# Patient Record
Sex: FEMALE | Race: WHITE | Employment: UNEMPLOYED | URBAN - METROPOLITAN AREA
[De-identification: names, ages, dates, MRNs, and addresses within clinical notes are randomized per-mention and may not be internally consistent; named-entity substitution may affect disease eponyms.]

---

## 2017-06-22 RX ORDER — TEMAZEPAM 30 MG/1
30 CAPSULE ORAL
COMMUNITY
End: 2017-08-15

## 2017-06-22 RX ORDER — PREGABALIN 75 MG/1
150 CAPSULE ORAL 3 TIMES DAILY
COMMUNITY

## 2017-06-22 RX ORDER — METFORMIN HYDROCHLORIDE 500 MG/1
500 TABLET, FILM COATED, EXTENDED RELEASE ORAL
COMMUNITY
End: 2017-08-15

## 2017-06-22 RX ORDER — DIPHENOXYLATE HYDROCHLORIDE AND ATROPINE SULFATE 2.5; .025 MG/1; MG/1
1 TABLET ORAL
COMMUNITY
End: 2017-08-15

## 2017-06-22 RX ORDER — FUROSEMIDE 20 MG/1
20 TABLET ORAL DAILY
COMMUNITY

## 2017-06-22 RX ORDER — ALPRAZOLAM 1 MG/1
1 TABLET ORAL
COMMUNITY

## 2017-06-22 RX ORDER — TRAMADOL HYDROCHLORIDE 50 MG/1
50 TABLET ORAL
COMMUNITY
End: 2017-08-15

## 2017-06-22 NOTE — PROGRESS NOTES
PT aware of NPO status, except morning medications and inhalers/nebulizers. PT aware of need to hold anticoagulants per protocol. PT aware of potential for sedation administration and need for  at discharge. PT aware of arrival time pre procedure, 0730. Pt states no questions at this time and has our number if any arise. She is also aware of 4 hour hold post procedure.

## 2017-06-28 ENCOUNTER — HOSPITAL ENCOUNTER (OUTPATIENT)
Dept: GENERAL RADIOLOGY | Age: 52
Discharge: HOME | End: 2017-06-28
Attending: RADIOLOGY | Admitting: RADIOLOGY
Payer: MEDICARE

## 2017-06-28 ENCOUNTER — HOSPITAL ENCOUNTER (OUTPATIENT)
Dept: CT IMAGING | Age: 52
Discharge: HOME | End: 2017-06-28
Attending: ORTHOPAEDIC SURGERY
Payer: MEDICARE

## 2017-06-28 VITALS
RESPIRATION RATE: 18 BRPM | WEIGHT: 250 LBS | TEMPERATURE: 98.9 F | SYSTOLIC BLOOD PRESSURE: 122 MMHG | HEIGHT: 67 IN | DIASTOLIC BLOOD PRESSURE: 82 MMHG | OXYGEN SATURATION: 100 % | BODY MASS INDEX: 39.24 KG/M2 | HEART RATE: 93 BPM

## 2017-06-28 DIAGNOSIS — M54.50 LOW BACK PAIN: ICD-10-CM

## 2017-06-28 PROCEDURE — 74011636320 HC RX REV CODE- 636/320: Performed by: RADIOLOGY

## 2017-06-28 PROCEDURE — 72132 CT LUMBAR SPINE W/DYE: CPT

## 2017-06-28 PROCEDURE — 74011000250 HC RX REV CODE- 250: Performed by: RADIOLOGY

## 2017-06-28 PROCEDURE — 62304 MYELOGRAPHY LUMBAR INJECTION: CPT

## 2017-06-28 RX ORDER — SODIUM BICARBONATE 84 MG/ML
1-50 INJECTION, SOLUTION INTRAVENOUS
Status: COMPLETED | OUTPATIENT
Start: 2017-06-28 | End: 2017-06-28

## 2017-06-28 RX ORDER — ACETAMINOPHEN 325 MG/1
650 TABLET ORAL
Status: DISCONTINUED | OUTPATIENT
Start: 2017-06-28 | End: 2017-06-28 | Stop reason: HOSPADM

## 2017-06-28 RX ORDER — HYDROCODONE BITARTRATE AND ACETAMINOPHEN 7.5; 325 MG/1; MG/1
2 TABLET ORAL
Status: DISCONTINUED | OUTPATIENT
Start: 2017-06-28 | End: 2017-06-28 | Stop reason: HOSPADM

## 2017-06-28 RX ORDER — LIDOCAINE HYDROCHLORIDE 10 MG/ML
1-10 INJECTION, SOLUTION EPIDURAL; INFILTRATION; INTRACAUDAL; PERINEURAL
Status: COMPLETED | OUTPATIENT
Start: 2017-06-28 | End: 2017-06-28

## 2017-06-28 RX ADMIN — LIDOCAINE HYDROCHLORIDE 4 ML: 10 INJECTION, SOLUTION EPIDURAL; INFILTRATION; INTRACAUDAL; PERINEURAL at 10:03

## 2017-06-28 RX ADMIN — SODIUM BICARBONATE 1 MEQ: 84 INJECTION, SOLUTION INTRAVENOUS at 10:02

## 2017-06-28 RX ADMIN — IOPAMIDOL 12 ML: 408 INJECTION, SOLUTION INTRATHECAL at 10:04

## 2017-06-28 NOTE — DISCHARGE INSTRUCTIONS
Myelogram: About This Test  What is it? A myelogram uses X-rays and a special dye to make pictures of bones and nerves of the spine (spinal canal). The spinal canal holds the spinal cord, the spinal nerve roots, and the fluid-filled space between the bones in your spine. Why is this test done? A myelogram is done to check for:  · The cause of arm or leg numbness, weakness, or pain. · Narrowing of the spinal canal (spinal stenosis). · A tumor or infection causing problems with the spinal cord or nerve roots. · A spinal disc that has ruptured (herniated disc). · Inflammation of the membrane that covers the brain and spinal cord. · Problems with the blood vessels to the spine. How can you prepare for the test?  Your doctor will tell you if you need to change how much you eat and drink before the myelogram. You may be asked to increase the amount of water you drink before the test. Follow the instructions your doctor gives you about eating and drinking. Before a myelogram, tell your doctor if:  · You are allergic to any medicines, contrast material, or iodine dye. · You have had bleeding problems, or you take a blood thinner, such as aspirin, clopidogrel (Plavix), or warfarin (Coumadin), or you take over-the-counter medicines, such as ibuprofen (Advil, Motrin) or naproxen (Aleve). Your doctor will tell you when you should stop taking these medicines several days before your procedure. Make sure that you understand exactly what he or she wants you to do. · You have had kidney problems. · You have diabetes, especially if you take metformin (Glucophage, for example). · You are or might be pregnant. Ask someone to take you home and stay with you after the test.  What happens during the test?  The dye is put into your spinal canal with a thin needle. This is called a lumbar puncture. The dye moves through the space so the nerve roots and spinal cord can be seen more clearly.  After the dye is put in, you will lie still while the X-ray pictures are taken. How does it feel? You will feel a quick sting from a small needle that has medicine to numb the skin on your back. You will also feel some pressure as the long, thin spinal needle is put into your spinal canal. You may feel a quick sharp pain down your buttock or leg when the needle is moved in your spine. The dye may make you feel warm and flushed and have a metallic taste in your mouth. What else should you know about the test?  In rare cases, the hole made by the needle in the sac around the spine does not close normally. This can allow spinal fluid to leak out. This leak may need to be repaired through a procedure called an epidural blood patch. To do the patch, your doctor injects some of your own blood to cover the hole. How long does the test take? · A myelogram usually takes 30 minutes to 1 hour. What happens after the test?  · You will probably be able to go home 30 minutes to 2 hours after the test.  · You may need to lie in bed with your head raised for 4 to 24 hours after the test. To prevent seizures, which are a rare side effect, do not bend over or lie down with your head lower than your body. Keeping your head higher than your body after a myelogram also may help prevent or reduce other side effects, such as headache, nausea, or vomiting. · Avoid strenuous activity, such as running or heavy lifting, for at least 1 day after your myelogram.  · Drink plenty of water after the myelogram. Your doctor will give you instructions on taking your regular medicines. When should you call for help? Call 911 anytime you think you may need emergency care. For example, call if:  · You have a seizure. Call your doctor now or seek immediate medical care if:  · You have any increase in pain, weakness, or numbness in your legs. · You have a severe headache or stiff neck, or your eyes become very sensitive to light.   · You have a headache that lasts longer than 24 hours. · You have problems urinating or having a bowel movement. · You have a fever. Follow-up care is a key part of your treatment and safety. Be sure to make and go to all appointments, and call your doctor if you are having problems. It's also a good idea to keep a list of the medicines you take. Ask your doctor when you can expect to have your test results. Where can you learn more? Go to http://otilio-binh.info/. Enter P325 in the search box to learn more about \"Myelogram: About This Test.\"  Current as of: October 14, 2016  Content Version: 11.3  © 9253-9447 "[employer]. Care instructions adapted under license by [employer] (which disclaims liability or warranty for this information). If you have questions about a medical condition or this instruction, always ask your healthcare professional. [place] [address] any warranty or liability for your use of this information. DISCHARGE SUMMARY from Nurse       PATIENT INSTRUCTIONS:    After general anesthesia or intravenous sedation, for 24 hours or while taking prescription Narcotics:  · Limit your activities  · Do not drive and operate hazardous machinery  · Do not make important personal or business decisions  · Do  not drink alcoholic beverages  · If you have not urinated within 8 hours after discharge, please contact your surgeon on call.     Report the following to your surgeon:  · Excessive pain, swelling, redness or odor of or around the surgical area  · Temperature over 100.5  · Nausea and vomiting lasting longer than 4 hours or if unable to take medications  · Any signs of decreased circulation or nerve impairment to extremity: change in color, persistent  numbness, tingling, coldness or increase pain  · Any questions        What to do at Home:  Recommended activity: Activity as tolerated,     If you experience any of the following symptoms bleeding, numness, please follow up with DR Savage Rendon      *  Please give a list of your current medications to your Primary Care Provider. *  Please update this list whenever your medications are discontinued, doses are      changed, or new medications (including over-the-counter products) are added. *  Please carry medication information at all times in case of emergency situations. These are general instructions for a healthy lifestyle:    No smoking/ No tobacco products/ Avoid exposure to second hand smoke    Surgeon General's Warning:  Quitting smoking now greatly reduces serious risk to your health. Obesity, smoking, and sedentary lifestyle greatly increases your risk for illness    A healthy diet, regular physical exercise & weight monitoring are important for maintaining a healthy lifestyle    You may be retaining fluid if you have a history of heart failure or if you experience any of the following symptoms:  Weight gain of 3 pounds or more overnight or 5 pounds in a week, increased swelling in our hands or feet or shortness of breath while lying flat in bed. Please call your doctor as soon as you notice any of these symptoms; do not wait until your next office visit. Recognize signs and symptoms of STROKE:    F-face looks uneven    A-arms unable to move or move unevenly    S-speech slurred or non-existent    T-time-call 911 as soon as signs and symptoms begin-DO NOT go       Back to bed or wait to see if you get better-TIME IS BRAIN. Warning Signs of HEART ATTACK     Call 911 if you have these symptoms:   Chest discomfort. Most heart attacks involve discomfort in the center of the chest that lasts more than a few minutes, or that goes away and comes back. It can feel like uncomfortable pressure, squeezing, fullness, or pain.  Discomfort in other areas of the upper body. Symptoms can include pain or discomfort in one or both arms, the back, neck, jaw, or stomach.    Shortness of breath with or without chest discomfort.  Other signs may include breaking out in a cold sweat, nausea, or lightheadedness. Don't wait more than five minutes to call 911 - MINUTES MATTER! Fast action can save your life. Calling 911 is almost always the fastest way to get lifesaving treatment. Emergency Medical Services staff can begin treatment when they arrive -- up to an hour sooner than if someone gets to the hospital by car. The discharge information has been reviewed with the patient and caregiver. The patient and caregiver verbalized understanding. Discharge medications reviewed with the patient and caregiver and appropriate educational materials and side effects teaching were provided. Myelogram: About This Test  What is it? A myelogram uses X-rays and a special dye to make pictures of bones and nerves of the spine (spinal canal). The spinal canal holds the spinal cord, the spinal nerve roots, and the fluid-filled space between the bones in your spine. Why is this test done? A myelogram is done to check for:  · The cause of arm or leg numbness, weakness, or pain. · Narrowing of the spinal canal (spinal stenosis). · A tumor or infection causing problems with the spinal cord or nerve roots. · A spinal disc that has ruptured (herniated disc). · Inflammation of the membrane that covers the brain and spinal cord. · Problems with the blood vessels to the spine. How can you prepare for the test?  Your doctor will tell you if you need to change how much you eat and drink before the myelogram. You may be asked to increase the amount of water you drink before the test. Follow the instructions your doctor gives you about eating and drinking. Before a myelogram, tell your doctor if:  · You are allergic to any medicines, contrast material, or iodine dye.   · You have had bleeding problems, or you take a blood thinner, such as aspirin, clopidogrel (Plavix), or warfarin (Coumadin), or you take over-the-counter medicines, such as ibuprofen (Advil, Motrin) or naproxen (Aleve). Your doctor will tell you when you should stop taking these medicines several days before your procedure. Make sure that you understand exactly what he or she wants you to do. · You have had kidney problems. · You have diabetes, especially if you take metformin (Glucophage, for example). · You are or might be pregnant. Ask someone to take you home and stay with you after the test.  What happens during the test?  The dye is put into your spinal canal with a thin needle. This is called a lumbar puncture. The dye moves through the space so the nerve roots and spinal cord can be seen more clearly. After the dye is put in, you will lie still while the X-ray pictures are taken. How does it feel? You will feel a quick sting from a small needle that has medicine to numb the skin on your back. You will also feel some pressure as the long, thin spinal needle is put into your spinal canal. You may feel a quick sharp pain down your buttock or leg when the needle is moved in your spine. The dye may make you feel warm and flushed and have a metallic taste in your mouth. What else should you know about the test?  In rare cases, the hole made by the needle in the sac around the spine does not close normally. This can allow spinal fluid to leak out. This leak may need to be repaired through a procedure called an epidural blood patch. To do the patch, your doctor injects some of your own blood to cover the hole. How long does the test take? · A myelogram usually takes 30 minutes to 1 hour. What happens after the test?  · You will probably be able to go home 30 minutes to 2 hours after the test.  · You may need to lie in bed with your head raised for 4 to 24 hours after the test. To prevent seizures, which are a rare side effect, do not bend over or lie down with your head lower than your body.  Keeping your head higher than your body after a myelogram also may help prevent or reduce other side effects, such as headache, nausea, or vomiting. · Avoid strenuous activity, such as running or heavy lifting, for at least 1 day after your myelogram.  · Drink plenty of water after the myelogram. Your doctor will give you instructions on taking your regular medicines. When should you call for help? Call 911 anytime you think you may need emergency care. For example, call if:  · You have a seizure. Call your doctor now or seek immediate medical care if:  · You have any increase in pain, weakness, or numbness in your legs. · You have a severe headache or stiff neck, or your eyes become very sensitive to light. · You have a headache that lasts longer than 24 hours. · You have problems urinating or having a bowel movement. · You have a fever. Follow-up care is a key part of your treatment and safety. Be sure to make and go to all appointments, and call your doctor if you are having problems. It's also a good idea to keep a list of the medicines you take. Ask your doctor when you can expect to have your test results. Where can you learn more? Go to http://otilio-binh.info/. Enter F415 in the search box to learn more about \"Myelogram: About This Test.\"  Current as of: October 14, 2016  Content Version: 11.3  © 4636-5334 [employer]. Care instructions adapted under license by [employer] (which disclaims liability or warranty for this information). If you have questions about a medical condition or this instruction, always ask your healthcare professional. [place] [address] any warranty or liability for your use of this information.     DISCHARGE SUMMARY from Nurse    The following personal items are in your possession at time of discharge:                                    PATIENT INSTRUCTIONS:    After general anesthesia or intravenous sedation, for 24 hours or while taking prescription Narcotics:  · Limit your activities  · Do not drive and operate hazardous machinery  · Do not make important personal or business decisions  · Do  not drink alcoholic beverages  · If you have not urinated within 8 hours after discharge, please contact your surgeon on call. Report the following to your surgeon:  · Excessive pain, swelling, redness or odor of or around the surgical area  · Temperature over 100.5  · Nausea and vomiting lasting longer than 4 hours or if unable to take medications  · Any signs of decreased circulation or nerve impairment to extremity: change in color, persistent  numbness, tingling, coldness or increase pain  · Any questions        What to do at Home:  Recommended activity: Activity as tolerated,     These are general instructions for a healthy lifestyle:    No smoking/ No tobacco products/ Avoid exposure to second hand smoke    Surgeon General's Warning:  Quitting smoking now greatly reduces serious risk to your health. Obesity, smoking, and sedentary lifestyle greatly increases your risk for illness    A healthy diet, regular physical exercise & weight monitoring are important for maintaining a healthy lifestyle    You may be retaining fluid if you have a history of heart failure or if you experience any of the following symptoms:  Weight gain of 3 pounds or more overnight or 5 pounds in a week, increased swelling in our hands or feet or shortness of breath while lying flat in bed. Please call your doctor as soon as you notice any of these symptoms; do not wait until your next office visit. Recognize signs and symptoms of STROKE:    F-face looks uneven    A-arms unable to move or move unevenly    S-speech slurred or non-existent    T-time-call 911 as soon as signs and symptoms begin-DO NOT go       Back to bed or wait to see if you get better-TIME IS BRAIN. Warning Signs of HEART ATTACK     Call 911 if you have these symptoms:   Chest discomfort.  Most heart attacks involve discomfort in the center of the chest that lasts more than a few minutes, or that goes away and comes back. It can feel like uncomfortable pressure, squeezing, fullness, or pain.  Discomfort in other areas of the upper body. Symptoms can include pain or discomfort in one or both arms, the back, neck, jaw, or stomach.  Shortness of breath with or without chest discomfort.  Other signs may include breaking out in a cold sweat, nausea, or lightheadedness. Don't wait more than five minutes to call 911 - MINUTES MATTER! Fast action can save your life. Calling 911 is almost always the fastest way to get lifesaving treatment. Emergency Medical Services staff can begin treatment when they arrive -- up to an hour sooner than if someone gets to the hospital by car. The discharge information has been reviewed with the patient and caregiver. The patient and caregiver verbalized understanding. Discharge medications reviewed with the patient and caregiver and appropriate educational materials and side effects teaching were provided.         Patient armband removed and shredded

## 2017-06-28 NOTE — ROUTINE PROCESS
Patient tolerated procedure well, has one needlestick and one band aid to back, spoke with Della Seo in heart center

## 2017-06-28 NOTE — PROGRESS NOTES
1100 Discharge instructions given to patient and daughter and they verbalized all understanding. Pt voiced no complains of pain @ the time of discharge. Pt was escorted via wheelchair to car. Left with daughter in stable condition.

## 2017-06-28 NOTE — H&P
..      The patient is an appropriate candidate to undergo Lumbar Puncture for CT Myelogram.    History and Physical update:  H&P was reviewed and the patient was evaluated. No changes have occurred in the patient's condition since the H&P was completed.     Landon Abdalla MD  Vascular & Interventional Radiology  [facility]  6/28/2017

## 2017-08-15 ENCOUNTER — HOSPITAL ENCOUNTER (OUTPATIENT)
Dept: PREADMISSION TESTING | Age: 52
Discharge: HOME | End: 2017-08-15
Payer: MEDICARE

## 2017-08-15 VITALS — WEIGHT: 251 LBS | HEIGHT: 67 IN | BODY MASS INDEX: 39.39 KG/M2

## 2017-08-15 LAB
ANION GAP BLD CALC-SCNC: 9 MMOL/L (ref 3–18)
APTT PPP: 25.2 SEC (ref 23–36.4)
BUN SERPL-MCNC: 12 MG/DL (ref 7–18)
BUN/CREAT SERPL: 14 (ref 12–20)
CALCIUM SERPL-MCNC: 9.5 MG/DL (ref 8.5–10.1)
CHLORIDE SERPL-SCNC: 100 MMOL/L (ref 100–108)
CO2 SERPL-SCNC: 29 MMOL/L (ref 21–32)
CREAT SERPL-MCNC: 0.84 MG/DL (ref 0.6–1.3)
ERYTHROCYTE [DISTWIDTH] IN BLOOD BY AUTOMATED COUNT: 12.6 % (ref 11.6–14.5)
EST. AVERAGE GLUCOSE BLD GHB EST-MCNC: 252 MG/DL
GLUCOSE SERPL-MCNC: 368 MG/DL (ref 74–99)
HBA1C MFR BLD: 10.4 % (ref 4.5–5.6)
HCT VFR BLD AUTO: 36.7 % (ref 35–45)
HGB BLD-MCNC: 12.4 G/DL (ref 12–16)
INR PPP: 0.9 (ref 0.8–1.2)
MCH RBC QN AUTO: 26.3 PG (ref 24–34)
MCHC RBC AUTO-ENTMCNC: 33.8 G/DL (ref 31–37)
MCV RBC AUTO: 77.8 FL (ref 74–97)
PLATELET # BLD AUTO: 284 K/UL (ref 135–420)
PMV BLD AUTO: 10.9 FL (ref 9.2–11.8)
POTASSIUM SERPL-SCNC: 4 MMOL/L (ref 3.5–5.5)
PROTHROMBIN TIME: 11.6 SEC (ref 11.5–15.2)
RBC # BLD AUTO: 4.72 M/UL (ref 4.2–5.3)
SODIUM SERPL-SCNC: 138 MMOL/L (ref 136–145)
WBC # BLD AUTO: 6.7 K/UL (ref 4.6–13.2)

## 2017-08-15 PROCEDURE — 80048 BASIC METABOLIC PNL TOTAL CA: CPT | Performed by: ORTHOPAEDIC SURGERY

## 2017-08-15 PROCEDURE — 85730 THROMBOPLASTIN TIME PARTIAL: CPT | Performed by: ORTHOPAEDIC SURGERY

## 2017-08-15 PROCEDURE — 85027 COMPLETE CBC AUTOMATED: CPT | Performed by: ORTHOPAEDIC SURGERY

## 2017-08-15 PROCEDURE — 87641 MR-STAPH DNA AMP PROBE: CPT | Performed by: ORTHOPAEDIC SURGERY

## 2017-08-15 PROCEDURE — 85610 PROTHROMBIN TIME: CPT | Performed by: ORTHOPAEDIC SURGERY

## 2017-08-15 PROCEDURE — 83036 HEMOGLOBIN GLYCOSYLATED A1C: CPT | Performed by: ANESTHESIOLOGY

## 2017-08-15 RX ORDER — INSULIN GLARGINE 100 [IU]/ML
INJECTION, SOLUTION SUBCUTANEOUS 2 TIMES DAILY
Status: ON HOLD | COMMUNITY
End: 2019-01-16

## 2017-08-15 RX ORDER — INSULIN LISPRO 100 [IU]/ML
INJECTION, SOLUTION INTRAVENOUS; SUBCUTANEOUS
Status: ON HOLD | COMMUNITY
End: 2019-01-16

## 2017-08-15 RX ORDER — ZOLPIDEM TARTRATE 10 MG/1
TABLET ORAL
COMMUNITY

## 2017-08-15 RX ORDER — SODIUM CHLORIDE, SODIUM LACTATE, POTASSIUM CHLORIDE, CALCIUM CHLORIDE 600; 310; 30; 20 MG/100ML; MG/100ML; MG/100ML; MG/100ML
125 INJECTION, SOLUTION INTRAVENOUS CONTINUOUS
Status: CANCELLED | OUTPATIENT
Start: 2017-08-15

## 2017-08-15 RX ORDER — ALBUTEROL SULFATE 90 UG/1
AEROSOL, METERED RESPIRATORY (INHALATION)
COMMUNITY

## 2017-08-15 RX ORDER — CEFAZOLIN SODIUM 2 G/50ML
2 SOLUTION INTRAVENOUS ONCE
Status: CANCELLED | OUTPATIENT
Start: 2017-08-15 | End: 2017-08-15

## 2017-08-15 NOTE — PERIOP NOTES
No sleep apnea or previous sleep studies. No history of MH. PCP is aware of surgery. Care fusion instructions reviewed and kit given. Meets  criteria for special population at this time, OR posting notified. Not participating in clinical trials or research study. Verbal permission given to view records in [facility]. Patient will call Endocrinologist for pre-op recommendations for insulin.

## 2017-08-16 LAB
BACTERIA SPEC CULT: NORMAL
SERVICE CMNT-IMP: NORMAL

## 2017-08-17 NOTE — PERIOP NOTES
Spoke to Dr. Myranda Beach office, they will fax clearance, office visit, stress test and ekg this am.

## 2017-08-17 NOTE — PERIOP NOTES
Message left with Lauren at Dr. [facility] office regarding Dr. Omega Choudhary request for pulmonary clearance.  Also left message this am regarding HgA1c of 10.4 and glucose of 368

## 2017-12-11 NOTE — IP AVS SNAPSHOT
Zeeshan Banuelos 
 
 
 [address] [facility] [address] 
[phone] Patient: Kelley Johnson MRN: [id number] :1965 You are allergic to the following Allergen Reactions Sulfa (Sulfonamide Antibiotics) Hives Rash Itching Recent Documentation Height Weight BMI OB Status Smoking Status 1.702 m 113.4 kg 39.16 kg/m2 Hysterectomy Current Every Day Smoker Emergency Contacts Name Discharge Info Relation Home Work Mobile [place]  Child [2] [phone] About your hospitalization You were admitted on:  2017 You last received care in the:  [address] You were discharged on:  2017 Unit phone number:  [phone] Why you were hospitalized Your primary diagnosis was:  Not on File Providers Seen During Your Hospitalizations Provider Role Specialty Primary office phone Angi Joshi MD Attending Provider Diagnostic Radiology [phone] Your Primary Care Physician (PCP) Primary Care Physician Office Phone Office Fax Herb Uriostegui, Tawny [facility] [phone] Follow-up Information Follow up With Details Comments Contact Info [place]   [id number] [place] [address] [place] [address] 
[phone] Current Discharge Medication List  
  
ASK your doctor about these medications Dose & Instructions Dispensing Information Comments Morning Noon Evening Bedtime  
 albuterol sulfate 2.5 mg/0.5 mL Nebu nebulizer solution Commonly known as:  PROVENTIL;VENTOLIN Your last dose was: Your next dose is:    
   
   
 Dose:  2.5 mg  
2.5 mg by Nebulization route four (4) times daily. Indications: BRONCHOSPASM PREVENTION Refills:  0  
     
   
   
   
  
 albuterol-ipratropium 2.5 mg-0.5 mg/3 ml Nebu Commonly known as:  Henretta Kidney Your last dose was: Your next dose is:    
   
   
 Dose:  3 mL  
3 mL by Nebulization route every four (4) hours. Indications: CHRONIC OBSTRUCTIVE PULMONARY DISEASE WITH BRONCHOSPASMS Refills:  0 ALPRAZolam 1 mg tablet Commonly known as:  Hillary Casarez Your last dose was: Your next dose is:    
   
   
 Dose:  1 mg Take 1 mg by mouth. Indications: anxiety Refills:  0  
     
   
   
   
  
 ATROVENT HFA 17 mcg/actuation inhaler Generic drug:  ipratropium Your last dose was: Your next dose is:    
   
   
 Dose:  1 Puff Take 1 Puff by inhalation. Refills:  0  
     
   
   
   
  
 CYMBALTA 60 mg capsule Generic drug:  DULoxetine Your last dose was: Your next dose is:    
   
   
 Dose:  120 mg Take 120 mg by mouth daily. Refills:  0  
     
   
   
   
  
 DALIRESP Tab tablet Generic drug:  roflumilast  
   
Your last dose was: Your next dose is:    
   
   
 Dose:  500 mcg Take 500 mcg by mouth daily. Indications: COPD ASSOCIATED WITH CHRONIC BRONCHITIS Refills:  0  
     
   
   
   
  
 diphenoxylate-atropine 2.5-0.025 mg per tablet Commonly known as:  LOMOTIL Your last dose was: Your next dose is:    
   
   
 Dose:  1 Tab Take 1 Tab by mouth four (4) times daily as needed for Diarrhea. Refills:  0 [address] Your last dose was: Your next dose is:    
   
   
 Dose:  2 Puff Take 2 puffs by inhalation two (2) times a day. Refills:  0 HumaLOG Mix 50-50 100 unit/mL (50-50) injection Generic drug:  insulin lispro protamine/insulin lispro Your last dose was: Your next dose is:    
   
   
 by SubCUTAneous route two (2) times a day. SLIDING SCALE Refills:  0  
     
   
   
   
  
 LASIX 20 mg tablet Generic drug:  furosemide Your last dose was: Your next dose is:    
   
   
 Dose:  20 mg Take 20 mg by mouth daily. Refills:  0  
     
   
   
   
  
 lisinopril-hydroCHLOROthiazide 10-12.5 mg per tablet Commonly known as:  Chidi Rasp Your last dose was: Your next dose is:    
   
   
 Dose:  1 Tab Take 1 Tab by mouth daily. Instructed to take with sip of water DOS Refills:  0 LYRICA 75 mg capsule Generic drug:  pregabalin Your last dose was: Your next dose is:    
   
   
 Dose:  75 mg Take 75 mg by mouth. Refills:  0  
     
   
   
   
  
 metFORMIN 500 mg Tg24 24 hour tablet Commonly known asAmadeo Leather ER Your last dose was: Your next dose is:    
   
   
 Dose:  500 mg Take 500 mg by mouth. Refills:  0 NexIUM 40 mg capsule Generic drug:  esomeprazole Your last dose was: Your next dose is: Take  by mouth daily. Instructed to take with sip of water DOS Refills:  0  
     
   
   
   
  
 temazepam 30 mg capsule Commonly known as:  RESTORIL Your last dose was: Your next dose is:    
   
   
 Dose:  30 mg Take 30 mg by mouth nightly as needed for Sleep. Refills:  0  
     
   
   
   
  
 traMADol 50 mg tablet Commonly known as:  ULTRAM  
   
Your last dose was: Your next dose is:    
   
   
 Dose:  50 mg Take 50 mg by mouth every six (6) hours as needed for Pain. Refills:  0 Discharge Instructions Myelogram: About This Test 
What is it? A myelogram uses X-rays and a special dye to make pictures of bones and nerves of the spine (spinal canal). The spinal canal holds the spinal cord, the spinal nerve roots, and the fluid-filled space between the bones in your spine. Why is this test done? A myelogram is done to check for: · The cause of arm or leg numbness, weakness, or pain. · Narrowing of the spinal canal (spinal stenosis). · A tumor or infection causing problems with the spinal cord or nerve roots. · A spinal disc that has ruptured (herniated disc). · Inflammation of the membrane that covers the brain and spinal cord. · Problems with the blood vessels to the spine. How can you prepare for the test? 
Your doctor will tell you if you need to change how much you eat and drink before the myelogram. You may be asked to increase the amount of water you drink before the test. Follow the instructions your doctor gives you about eating and drinking. Before a myelogram, tell your doctor if: 
· You are allergic to any medicines, contrast material, or iodine dye. · You have had bleeding problems, or you take a blood thinner, such as aspirin, clopidogrel (Plavix), or warfarin (Coumadin), or you take over-the-counter medicines, such as ibuprofen (Advil, Motrin) or naproxen (Aleve). Your doctor will tell you when you should stop taking these medicines several days before your procedure. Make sure that you understand exactly what he or she wants you to do. · You have had kidney problems. · You have diabetes, especially if you take metformin (Glucophage, for example). · You are or might be pregnant. Ask someone to take you home and stay with you after the test. 
What happens during the test? 
The dye is put into your spinal canal with a thin needle. This is called a lumbar puncture. The dye moves through the space so the nerve roots and spinal cord can be seen more clearly. After the dye is put in, you will lie still while the X-ray pictures are taken. How does it feel? You will feel a quick sting from a small needle that has medicine to numb the skin on your back. You will also feel some pressure as the long, thin spinal needle is put into your spinal canal. You may feel a quick sharp pain down your buttock or leg when the needle is moved in your spine. The dye may make you feel warm and flushed and have a metallic taste in your mouth. What else should you know about the test? 
In rare cases, the hole made by the needle in the sac around the spine does not close normally. This can allow spinal fluid to leak out. This leak may need to be repaired through a procedure called an epidural blood patch. To do the patch, your doctor injects some of your own blood to cover the hole. How long does the test take? · A myelogram usually takes 30 minutes to 1 hour. What happens after the test? 
· You will probably be able to go home 30 minutes to 2 hours after the test. 
· You may need to lie in bed with your head raised for 4 to 24 hours after the test. To prevent seizures, which are a rare side effect, do not bend over or lie down with your head lower than your body. Keeping your head higher than your body after a myelogram also may help prevent or reduce other side effects, such as headache, nausea, or vomiting. · Avoid strenuous activity, such as running or heavy lifting, for at least 1 day after your myelogram. 
· Drink plenty of water after the myelogram. Your doctor will give you instructions on taking your regular medicines. When should you call for help? Call 911 anytime you think you may need emergency care. For example, call if: 
· You have a seizure. Call your doctor now or seek immediate medical care if: 
· You have any increase in pain, weakness, or numbness in your legs. · You have a severe headache or stiff neck, or your eyes become very sensitive to light. · You have a headache that lasts longer than 24 hours. · You have problems urinating or having a bowel movement. · You have a fever. Follow-up care is a key part of your treatment and safety. Be sure to make and go to all appointments, and call your doctor if you are having problems. It's also a good idea to keep a list of the medicines you take. Ask your doctor when you can expect to have your test results. Where can you learn more? Go to http://otilio-binh.info/. Enter E234 in the search box to learn more about \"Myelogram: About This Test.\" Current as of: October 14, 2016 Content Version: 11.3 © 4666-3018 [employer]. Care instructions adapted under license by [employer] (which disclaims liability or warranty for this information). If you have questions about a medical condition or this instruction, always ask your healthcare professional. [facility] any warranty or liability for your use of this information. DISCHARGE SUMMARY from Nurse PATIENT INSTRUCTIONS: 
 
 
F-face looks uneven A-arms unable to move or move unevenly S-speech slurred or non-existent T-time-call 911 as soon as signs and symptoms begin-DO NOT go Back to bed or wait to see if you get better-TIME IS BRAIN. Warning Signs of HEART ATTACK Call 911 if you have these symptoms: 
? Chest discomfort. Most heart attacks involve discomfort in the center of the chest that lasts more than a few minutes, or that goes away and comes back. It can feel like uncomfortable pressure, squeezing, fullness, or pain. ? Discomfort in other areas of the upper body. Symptoms can include pain or discomfort in one or both arms, the back, neck, jaw, or stomach. ? Shortness of breath with or without chest discomfort. ? Other signs may include breaking out in a cold sweat, nausea, or lightheadedness. Don't wait more than five minutes to call [address]! Fast action can save your life. Calling 911 is almost always the fastest way to get lifesaving treatment.  Emergency Medical Services staff can begin treatment when they arrive  up to an hour sooner than if someone gets to the hospital by car. The discharge information has been reviewed with the patient and caregiver. The patient and caregiver verbalized understanding. Discharge medications reviewed with the patient and caregiver and appropriate educational materials and side effects teaching were provided. Myelogram: About This Test 
What is it? A myelogram uses X-rays and a special dye to make pictures of bones and nerves of the spine (spinal canal). The spinal canal holds the spinal cord, the spinal nerve roots, and the fluid-filled space between the bones in your spine. Why is this test done? A myelogram is done to check for: · The cause of arm or leg numbness, weakness, or pain. · Narrowing of the spinal canal (spinal stenosis). · A tumor or infection causing problems with the spinal cord or nerve roots. · A spinal disc that has ruptured (herniated disc). · Inflammation of the membrane that covers the brain and spinal cord. · Problems with the blood vessels to the spine. How can you prepare for the test? 
Your doctor will tell you if you need to change how much you eat and drink before the myelogram. You may be asked to increase the amount of water you drink before the test. Follow the instructions your doctor gives you about eating and drinking. Before a myelogram, tell your doctor if: 
· You are allergic to any medicines, contrast material, or iodine dye. · You have had bleeding problems, or you take a blood thinner, such as aspirin, clopidogrel (Plavix), or warfarin (Coumadin), or you take over-the-counter medicines, such as ibuprofen (Advil, Motrin) or naproxen (Aleve). Your doctor will tell you when you should stop taking these medicines several days before your procedure. Make sure that you understand exactly what he or she wants you to do. · You have had kidney problems. · You have diabetes, especially if you take metformin (Glucophage, for example). · You are or might be pregnant. Ask someone to take you home and stay with you after the test. 
What happens during the test? 
The dye is put into your spinal canal with a thin needle. This is called a lumbar puncture. The dye moves through the space so the nerve roots and spinal cord can be seen more clearly. After the dye is put in, you will lie still while the X-ray pictures are taken. How does it feel? You will feel a quick sting from a small needle that has medicine to numb the skin on your back. You will also feel some pressure as the long, thin spinal needle is put into your spinal canal. You may feel a quick sharp pain down your buttock or leg when the needle is moved in your spine. The dye may make you feel warm and flushed and have a metallic taste in your mouth. What else should you know about the test? 
In rare cases, the hole made by the needle in the sac around the spine does not close normally. This can allow spinal fluid to leak out. This leak may need to be repaired through a procedure called an epidural blood patch. To do the patch, your doctor injects some of your own blood to cover the hole. How long does the test take? · A myelogram usually takes 30 minutes to 1 hour. What happens after the test? 
· You will probably be able to go home 30 minutes to 2 hours after the test. 
· You may need to lie in bed with your head raised for 4 to 24 hours after the test. To prevent seizures, which are a rare side effect, do not bend over or lie down with your head lower than your body. Keeping your head higher than your body after a myelogram also may help prevent or reduce other side effects, such as headache, nausea, or vomiting.  
· Avoid strenuous activity, such as running or heavy lifting, for at least 1 day after your myelogram. 
· Drink plenty of water after the myelogram. Your doctor will give you instructions on taking your regular medicines. When should you call for help? Call 911 anytime you think you may need emergency care. For example, call if: 
· You have a seizure. Call your doctor now or seek immediate medical care if: 
· You have any increase in pain, weakness, or numbness in your legs. · You have a severe headache or stiff neck, or your eyes become very sensitive to light. · You have a headache that lasts longer than 24 hours. · You have problems urinating or having a bowel movement. · You have a fever. Follow-up care is a key part of your treatment and safety. Be sure to make and go to all appointments, and call your doctor if you are having problems. It's also a good idea to keep a list of the medicines you take. Ask your doctor when you can expect to have your test results. Where can you learn more? Go to http://otilio-binh.info/. Enter R318 in the search box to learn more about \"Myelogram: About This Test.\" Current as of: October 14, 2016 Content Version: 11.3 © 2360-3702 [employer]. Care instructions adapted under license by [employer] (which disclaims liability or warranty for this information). If you have questions about a medical condition or this instruction, always ask your healthcare professional. [place] [address] any warranty or liability for your use of this information. DISCHARGE SUMMARY from Nurse The following personal items are in your possession at time of discharge: 
 
  
  
  
  
  
  
  
  
 
 
 
 
PATIENT INSTRUCTIONS: 
 
After general anesthesia or intravenous sedation, for 24 hours or while taking prescription Narcotics: · Limit your activities · Do not drive and operate hazardous machinery · Do not make important personal or business decisions · Do  not drink alcoholic beverages · If you have not urinated within 8 hours after discharge, please contact your surgeon on call. Report the following to your surgeon: 
· Excessive pain, swelling, redness or odor of or around the surgical area · Temperature over 100.5 · Nausea and vomiting lasting longer than 4 hours or if unable to take medications · Any signs of decreased circulation or nerve impairment to extremity: change in color, persistent  numbness, tingling, coldness or increase pain · Any questions What to do at Home: 
Recommended activity: Activity as tolerated, These are general instructions for a healthy lifestyle: No smoking/ No tobacco products/ Avoid exposure to second hand smoke Surgeon General's Warning:  Quitting smoking now greatly reduces serious risk to your health. Obesity, smoking, and sedentary lifestyle greatly increases your risk for illness A healthy diet, regular physical exercise & weight monitoring are important for maintaining a healthy lifestyle You may be retaining fluid if you have a history of heart failure or if you experience any of the following symptoms:  Weight gain of 3 pounds or more overnight or 5 pounds in a week, increased swelling in our hands or feet or shortness of breath while lying flat in bed. Please call your doctor as soon as you notice any of these symptoms; do not wait until your next office visit. Recognize signs and symptoms of STROKE: 
 
F-face looks uneven A-arms unable to move or move unevenly S-speech slurred or non-existent T-time-call 911 as soon as signs and symptoms begin-DO NOT go Back to bed or wait to see if you get better-TIME IS BRAIN. Warning Signs of HEART ATTACK Call 911 if you have these symptoms: 
? Chest discomfort. Most heart attacks involve discomfort in the center of the chest that lasts more than a few minutes, or that goes away and comes back. It can feel like uncomfortable pressure, squeezing, fullness, or pain. ? Discomfort in other areas of the upper body.  Symptoms can include pain or discomfort in one or both arms, the back, neck, jaw, or stomach. ? Shortness of breath with or without chest discomfort. ? Other signs may include breaking out in a cold sweat, nausea, or lightheadedness. Don't wait more than five minutes to call [address]! Fast action can save your life. Calling 911 is almost always the fastest way to get lifesaving treatment. Emergency Medical Services staff can begin treatment when they arrive  up to an hour sooner than if someone gets to the hospital by car. The discharge information has been reviewed with the patient and caregiver. The patient and caregiver verbalized understanding. Discharge medications reviewed with the patient and caregiver and appropriate educational materials and side effects teaching were provided. Patient armband removed and shredded Discharge Orders None Introducing [facility]! Nnamdi Cuellar introduces Face-Me patient portal. Now you can access parts of your medical record, email your doctor's office, and request medication refills online. 1. In your internet browser, go to [email or web address] 2. Click on the First Time User? Click Here link in the Sign In box. You will see the New Member Sign Up page. 3. Enter your the graftert Access Code exactly as it appears below. You will not need to use this code after youve completed the sign-up process. If you do not sign up before the expiration date, you must request a new code. · Face-Me Access Code: [email or web address] Expires: 9/10/2017 11:53 AM 
 
4. Enter the last four digits of your Social Security Number (xxxx) and Date of Birth (mm/dd/yyyy) as indicated and click Submit. You will be taken to the next sign-up page. 5. Create a the graftert ID. This will be your the graftert login ID and cannot be changed, so think of one that is secure and easy to remember. 6. Create a the graftert password. You can change your password at any time. 7. Enter your Password Reset Question and Answer. This can be used at a later time if you forget your password. 8. Enter your e-mail address. You will receive e-mail notification when new information is available in [address] 9. Click Sign Up. You can now view and download portions of your medical record. 10. Click the Download Summary menu link to download a portable copy of your medical information. If you have questions, please visit the Frequently Asked Questions section of the [facility] website. Remember, [facility] is NOT to be used for urgent needs. For medical emergencies, dial 911. Now available from your iPhone and Android! General Information Please provide this summary of care documentation to your next provider. Patient Signature:  ____________________________________________________________ Date:  ____________________________________________________________  
  
Jose Armando Malik Provider Signature:  ____________________________________________________________ Date:  ____________________________________________________________ done

## 2018-11-05 ENCOUNTER — DOCUMENTATION ONLY (OUTPATIENT)
Dept: PULMONOLOGY | Age: 53
End: 2018-11-05

## 2018-11-05 NOTE — PROGRESS NOTES
[facility] [place] [address] [employer] [id number]  [address]  Tel: [phone]  Fax: [phone]    PATIENT:       [facility]   YOB: 1965  DATE:       11/05/2018 11:00 AM  VISIT TYPE:       Office Visit      Completed Orders (this encounter)  Order Interpretation Result Next Lab Date   Giving encouragement to exercise      Dietary management education, guidance, and counseling        Assessment/Plan  # Detail Type Description    1. Assessment Sleep apnea in adult (G47.30). Impression -Snoring/witness apnea/ESS more than 9 suggest possibility of sleep apnea  -Studies were ordered in the past never been done  -Due to COPD/chronic respite a failure on home oxygen will require in lab sleep study  -If patient does have sleep apnea will benefit from CPAP/BiPAP treatment  -Plan of care discussed with the patient at length    3/20/2018:  -COPD/chronic respiratory failure/ongoing symptoms  -Another order for in lab PSG sent  -We'll follow after that    May 2018:  -Sleep study was canceled by 1 sleep (twice followed by once by the patient  -Still in a PSG is not done we have advised her stop to get it done before we see her next time  -We will follow up in 1-2 months    November 2018:  -Status post in lab PSG-could not sleep  Results: Inconclusive sleep study  -Patient had spent only 200 minute and sleep lab with sleep time of 76 minute  -Patient had cough and shortness of breath at nighttime requested oxygen-due to lack protocol oxygen was not given  -Patient shortened the study and went home around 2 a.m.  -Some alpha intrusion noted  -AHI was 3 per hour  -Oxygen tanner of 94 with average of 97%  -Arousal index of 17 per hour noted    Plan:  -We will plan home sleep study, advised patient to do home sleep study without oxygen and follow-up after that. Patient Plan Obstructive Sleep Apnea (NATHANIEL).        a.  The consequences of sleep apnea; cardiovascular disease (increase risk of stroke, heart disease, hypertension), cognitive difficulties (attention, memory, concentration difficulties), endocrine difficulties (insulin resistance and possible increase risk of diabetes) and vegetative symptoms (sleepiness, fatigue) were discussed with the patient. b.  The patient was warned that sleep apnea is associated with increase risk of motor vehicle accidents due to daytime sleepiness. c.  The pathophysiology of sleep apnea and different treatment modalities (CPAP, surgery, dental appliance, weight reduction, positional therapy, medications) were discussed. 2. Assessment Asthma due to environmental allergies (J45.909).     Impression 5/22/2018:  -Came for follow-up  -Ongoing symptoms of cough wheezing and shortness of breath  -Jens in office today is 47  -Also received prednisone in between and she came for PFT  -As per the patient she is not smoking for last few days  -FEV1 to FVC ratio 52 with FEV1 of 1.61 or 56% predicted suggest moderate obstructive airway disease with Armando bronchodilator response  -TLC and DLCO is normal  -Overall consistent with COPD/asthma overlap syndrome    -Sheng 1 is MM  -Anti-proteinase antibody 5.7 and high  -Allergen profile positive  -WBC 6.0 eosinophils 4% absolute eosinophils 0.3  -YAZMIN/RNP/other antibodies negative  -ACE level 33  -IgE level is 2554  -Aspergillus antibodies negative  8/13/2018:  -Came for follow-up  -Was seen by Dr. Kuldeep Hernadez, was found to be in exacerbation, given prednisone taper at that point  -Workup underway-chest x-ray was done-reported to be normal  -CT chest was suggested however I do not see any order in the system  -As per the patient she is doing well, occasional feeling shortness of breath mainly at nighttime  -Currently on Dulera/Spiriva/albuterol when necessary-stopped taking Singulair, on Daliresp not on any side effect  -Xolair once a month with [place]  -Pulmonary rehabilitation-management for few weeks-currently doing at home by herself  -Strong suspicion for Jassi Culver -continue close follow-up with rheumatology  -Add Zithromax 250 mg 3 times a week  -CT chest ordered as suggested by [id number] in 6 weeks  -Plan of care discussed extensively with the patient    August 20, 2018:  -Came as acute visit  -On 18th of August and it up in the [address] with fever cough wheezing  -Discharge with prednisone, came to my office still having coughing and wheezing  -Patient is telling me after she started Xolair she started feeling swollen up, more wheezing, more shortness of breath and she does not wish to continue taking Xolair anymore  -She definitely had Xolair in the past-discuss about Zithromax and its side effect however patient thinks this is all related to Xolair  -Eosinophil levels was 1%, influenza A and B was negative    Plan:  -DC Xolair per patient's request  -Referred to allergy and immunology to see if any other allergen short helps with the patient  -If patient continued to have issues consideration for other biological agent  -Prednisone Dosepak and Levaquin  -Follow-up in 2 weeks  -Other management as outlined in prior note    November 2018:  -Doing well currently using prednisone taper  -Allergy nodes appreciated, absolute eosinophils was more than 400  -However at that point patient was suffering from exacerbation and patient had a repeat absolute eosinophils was 0.1 or 100 at [facility]  -We will plan CBC with differential and follow-up to see if she qualifies for other biologic agents like Nucala or bellsamra  -Continue Dulera/Spiriva/Daliresp as outlined below. 3. 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     Impression -Anti-proteinase antibody 5.7 and high  -Possibility of Churg-Wilda syndrome and asthma  -Stop Singulair  -The prednisone course  -Rheumatology consult she used to see Dr. Panfilo Johnson in the past for some arthritis    8/13/2018:  -Continue with close follow-up with rheumatology  -Workup is in progress  -Chest CT was suggested however I do not see the order to place another order and cyst    November 2018:  --CT chest September 2018: 3-4 millimeter noncalcified nodule on LEFT upper lobe  -Central airways are normal  -Continue close follow-up with rheumatology-plan with ENT noted  -I strongly suspect Scott Strandburg playing a role and patient will benefit from biologic agent and treatment of Scott Strandburg. 4. Assessment COPD (J44.9). Impression -Active smoker with prior PFT showing severe airflow obstruction  -FEV1 of 1.39 or 49% per dictated  -Repeat PFT showed FEV1 of 1.61 or 56% per dictated improved from before  -Currently on Spiriva/Dulera/Daliresp   -6 met walk test in the office did not show exertional hypoxemia  -Continue with current inhaler refills has been ordered  -Continue oxygen at nighttime however will need evaluation for sleep apnea.     3/28/2018:  -Ongoing symptoms with ongoing smoking  -Smoking cessation counseling done  -Also using Atrovent along with Spiriva  -- SIMRAN in office today is 29 intermediate  -Stop Atrovent nebulizer  -Start Singulair  -Patient has some history of allergic component, Springtime worsening of symptoms, was on Xolair in the past, increase in eosinophil count    5/24/2018:  -Ongoing symptoms with possible exacerbation of asthma/COPD  -Continue Spiriva/Dulera/Daliresp, albuterol refill is given  -DC Singulair as concern over Scott Strandburg syndrome  -Pulmonary rehabilitation referral was made still awaiting response  -Start Xolair paperwork    8/13/2018:  -Chest x-ray August 2018: Reported to be normal  -Pulmonary rehabilitation patient went for few days and then stop  -Losing weight loss about 6 pounds  -Continue with inhaler therapy as outlined above  -Smoking cessation encouraged the patient    8/20/2018  -Chest x[facility] at [facility] reported to be normal  -Follow-up with exacerbation treatment/chest CT as outlined above    Nov 2018 Doing well in view of COPD/Asthma overlap  Management as outlined above  Will benefit from follow up CT due to extensive smoking in 6 month. 5. Assessment Chronic respiratory failure with hypoxia (J96.11). Impression -2 L of oxygen at nighttime  -Does not need oxygen during the daytime. 6. Assessment Smoker (F17.200). Impression -Smoking cessation counseling done spent 5-10 minutes currently on Chantix ordered. 3/28/2018:  -Smoking cessation counseling done currently on Chantix    May 2018:  -As per the patient she has not smoked for last 3 days, congratulated on this point    August 2018:  -As per the patient she started smoking back against cessation counseling done spent more than 3 minute    Nov 2018 patient is cutting back on smoking advise to stop completely   COunseling done spent 3 min. 7. Assessment Body mass index (BMI) 35.0-35.9, adult (Z68.35). Plan Orders Today's instructions / counseling include(s) Dietary management education, guidance, and counseling. Giving encouragement to exercise            Assessment  -Severe obstructive airway disease with FEV1 of 1.61 or 56% per dictated improved from before  -Acute exacerbation August 2018-things related to Xolair  -Possibility of allergic component and asthma COPD overlap syndrome-IgE level of 2500-on Xolair through [facility] -- could not tolerate  -Possibility of sleep apnea-in lab PSG inconclusive   -Anti-proteinase antibody positive-follows with rheumatology    Plan  -Home sleep study   -Continue current inhalers if was written  -Continue with Daliresp-on  Zithromax 3 times a week  -HOme sleep study  -Stops Xolair as things related to Xolair exacerbation-- Get CBC with diff to evaluate for nucala or bellsamra   -Follow-up in 6 weeks  -Patient is experiencing exacerbation symptoms, advice to go to emergency room if she does not do better with prednisone taper  I reviewed patient's medication and adjusted the medication. All the questions related to patient's management plan discussed and answered to patient's satisfaction. I spent 40  minutes of my time, more than 50% of time spent on face-to-face evaluation, for diagnosis pathology and discussion of various treatment options patient understood the plan all questions were on start. Voice-recognition software may have been used to generate this report, which may have resulted in some phonetic-based errors in grammar and contents. Even though attempts were made to correct all the mistakes, some may have been missed, and remained in the body of the document. This 48year old female presents for HPI and COPD (follow up). History of Present Illness:  1.   HPI   10/9/2017:  PCP Dr. Nino Wright, cardiology [facility], prior pulmonologist Dr. Emma Andres requested for evaluation of COPD/respiratory failure/sleep apnea  -Active smoker half pack per day more than 40-pack-year smoking history  -History of cough, wheezing, shortness of breath with exertion  -Recently been treated for exacerbation with prednisone taper  -Currently using Spiriva and Dulera  -History of use of mechanical ventilation in 2006, hospital admission about one year ago  -Denies any history of rheumatoid arthritis sarcoidosis or scleroderma  -Lives alone no pets at home no exposure to asbestosis or other fire  -Also complains of bilateral leg edema, some shortness of breath  -Other workup as outlined below    Sleep apnea:  -History of snoring, witness apnea, excess daytime sleepiness, ESS of 9, prior test done at home showed hypoxia at night  -Was asked to get sleep study done never followed through    Chronic respiratory failure:  -On home oxygen 2 L at nighttime  -Does not use oxygen during the daytime    3/28/2018:  -Patient was previously seen by me in October last year  -Lost follow-up  -Was sent as a new patient from primary care's office  -As per the patient for symptoms are getting worse  -Does complains of cough and wheezing using albuterol nebulizers more and more  -Still smokes but cut back on smoking 6 cigarettes a day  -History of emergency room visits to [facility], recently seen by primary care's office and was given prednisone taper due to wheezing  -No history of any hemoptysis or weight loss or loss of appetite  -Denies any chest pain or palpitation  -While review of the labs suggested high eosinophils counts, also which is shown suggested that's during the spring time more worsening of symptoms, was also on Xolair short in the past with improvement in the symptoms    November 2018:  -Came for follow-up, sleep study was done however patient only slept for 76 minute and studies inconclusive  -As per the patient she felt that she was having more congestion and started taking prednisone on her own  -Currently not having any wheezing shortness of breath or other complaint  -Seen by allergist we'll plan to do some allergen testing also suggested whether she qualifies for other Biologics  -Rheumatology is also evaluating her for positive p-ANCA    Investigation:  PFT May 2018  -FEV1 to FVC ratio 52 with FEV1 of 1.61 or 56% predicted suggest moderate obstructive airway disease with Armando bronchodilator response  -TLC and DLCO is normal  -Overall consistent with COPD/asthma overlap syndrome    -Sheng 1 is MM  -Anti-proteinase antibody 5.7 and high  -Allergen profile positive  -WBC 6.0 eosinophils 4% absolute eosinophils 0.3  -YAZMIN/RNP/other antibodies negative  -ACE level 33  -IgE level is 2554  -Aspergillus antibodies negative  -Jens in the office March 2018: 29 which is intermediate , May 2018 is 47    -PFT 2013  -Severe airflow obstruction  -FEV1 of 1.39 or 49% per dictated  -FEV1 to FVC ratio 56  -TLC of 86% predicted  -DLCO corrected two-volume is 119% predicted  -Noncyclic and bronchodilator response.    -In lab PSG October 2018:    Results: Inconclusive sleep study  -Patient had spent only 200 minute and sleep lab with sleep time of 76 minute  -Patient had cough and shortness of breath at nighttime requested oxygen-due to lack protocol oxygen was not given  -Patient shortened the study and went home around 2 a.m.  -Some alpha intrusion noted  -AHI was 3 per hour  -Oxygen tanner of 94 with average of 97%  -Arousal index of 17 per hour noted      -6.  Walk test October 9 2017:  Adequate distance 318 meters without any evidence of hypoxemia  Results: No evidence of resting/exertional hypoxemia to explain patient's symptoms.    -Chest x-ray May August 2018: Normal  -Chest x-ray Apri 2017:  Normal ([place])    -Stress test August 9983  LV systolic function is 01%  -Small perfusion abnormality in the apical septum consistent with prior infarction no evidence of inducible ischemia    -Lab work [facility] August 2018:-Eosinophil levels was 1%, influenza A and B was negative

## 2019-01-03 NOTE — PROGRESS NOTES
PT aware of NPO status. Agnes Carrillo PT aware of potential for sedation administration and need for  at discharge. PT aware of arrival time pre procedure. Patient will arrive at [address] [place] [address] for 1015 appt. Pt states no questions at this time.

## 2019-01-16 ENCOUNTER — HOSPITAL ENCOUNTER (OUTPATIENT)
Dept: CT IMAGING | Age: 54
Discharge: HOME | End: 2019-01-16
Attending: RADIOLOGY | Admitting: RADIOLOGY
Payer: MEDICAID

## 2019-01-16 VITALS
HEIGHT: 67 IN | HEART RATE: 106 BPM | OXYGEN SATURATION: 96 % | SYSTOLIC BLOOD PRESSURE: 143 MMHG | WEIGHT: 226.44 LBS | BODY MASS INDEX: 35.54 KG/M2 | RESPIRATION RATE: 20 BRPM | TEMPERATURE: 98 F | DIASTOLIC BLOOD PRESSURE: 78 MMHG

## 2019-01-16 DIAGNOSIS — N19 KIDNEY FAILURE: ICD-10-CM

## 2019-01-16 LAB
ANION GAP SERPL CALC-SCNC: 8 MMOL/L (ref 3–18)
APTT PPP: 27.3 SEC (ref 23–36.4)
BASOPHILS # BLD: 0 K/UL (ref 0–0.1)
BASOPHILS NFR BLD: 1 % (ref 0–2)
BUN SERPL-MCNC: 11 MG/DL (ref 7–18)
BUN/CREAT SERPL: 17 (ref 12–20)
CALCIUM SERPL-MCNC: 9 MG/DL (ref 8.5–10.1)
CHLORIDE SERPL-SCNC: 100 MMOL/L (ref 100–108)
CO2 SERPL-SCNC: 31 MMOL/L (ref 21–32)
CREAT SERPL-MCNC: 0.64 MG/DL (ref 0.6–1.3)
DIFFERENTIAL METHOD BLD: ABNORMAL
EOSINOPHIL # BLD: 0.5 K/UL (ref 0–0.4)
EOSINOPHIL NFR BLD: 7 % (ref 0–5)
ERYTHROCYTE [DISTWIDTH] IN BLOOD BY AUTOMATED COUNT: 12.7 % (ref 11.6–14.5)
GLUCOSE SERPL-MCNC: 359 MG/DL (ref 74–99)
HCT VFR BLD AUTO: 42.9 % (ref 35–45)
HGB BLD-MCNC: 14.3 G/DL (ref 12–16)
INR PPP: 0.9 (ref 0.8–1.2)
LYMPHOCYTES # BLD: 2.7 K/UL (ref 0.9–3.6)
LYMPHOCYTES NFR BLD: 41 % (ref 21–52)
MCH RBC QN AUTO: 27.2 PG (ref 24–34)
MCHC RBC AUTO-ENTMCNC: 33.3 G/DL (ref 31–37)
MCV RBC AUTO: 81.7 FL (ref 74–97)
MONOCYTES # BLD: 0.5 K/UL (ref 0.05–1.2)
MONOCYTES NFR BLD: 7 % (ref 3–10)
NEUTS SEG # BLD: 3 K/UL (ref 1.8–8)
NEUTS SEG NFR BLD: 44 % (ref 40–73)
PLATELET # BLD AUTO: 249 K/UL (ref 135–420)
PMV BLD AUTO: 10.6 FL (ref 9.2–11.8)
POTASSIUM SERPL-SCNC: 4.2 MMOL/L (ref 3.5–5.5)
PROTHROMBIN TIME: 12 SEC (ref 11.5–15.2)
RBC # BLD AUTO: 5.25 M/UL (ref 4.2–5.3)
SODIUM SERPL-SCNC: 139 MMOL/L (ref 136–145)
WBC # BLD AUTO: 6.7 K/UL (ref 4.6–13.2)

## 2019-01-16 PROCEDURE — 96374 THER/PROPH/DIAG INJ IV PUSH: CPT

## 2019-01-16 PROCEDURE — 94640 AIRWAY INHALATION TREATMENT: CPT

## 2019-01-16 PROCEDURE — 80048 BASIC METABOLIC PNL TOTAL CA: CPT

## 2019-01-16 PROCEDURE — 74011000250 HC RX REV CODE- 250: Performed by: RADIOLOGY

## 2019-01-16 PROCEDURE — 85610 PROTHROMBIN TIME: CPT

## 2019-01-16 PROCEDURE — 74011250636 HC RX REV CODE- 250/636: Performed by: RADIOLOGY

## 2019-01-16 PROCEDURE — 85025 COMPLETE CBC W/AUTO DIFF WBC: CPT

## 2019-01-16 PROCEDURE — 74011250636 HC RX REV CODE- 250/636

## 2019-01-16 PROCEDURE — 96375 TX/PRO/DX INJ NEW DRUG ADDON: CPT

## 2019-01-16 PROCEDURE — 85730 THROMBOPLASTIN TIME PARTIAL: CPT

## 2019-01-16 RX ORDER — LISINOPRIL AND HYDROCHLOROTHIAZIDE 10; 12.5 MG/1; MG/1
1 TABLET ORAL DAILY
COMMUNITY
End: 2022-08-22

## 2019-01-16 RX ORDER — FENTANYL CITRATE 50 UG/ML
INJECTION, SOLUTION INTRAMUSCULAR; INTRAVENOUS
Status: COMPLETED
Start: 2019-01-16 | End: 2019-01-16

## 2019-01-16 RX ORDER — FENTANYL CITRATE 50 UG/ML
25-200 INJECTION, SOLUTION INTRAMUSCULAR; INTRAVENOUS
Status: DISCONTINUED | OUTPATIENT
Start: 2019-01-16 | End: 2019-01-16 | Stop reason: HOSPADM

## 2019-01-16 RX ORDER — LIDOCAINE HYDROCHLORIDE 10 MG/ML
INJECTION, SOLUTION EPIDURAL; INFILTRATION; INTRACAUDAL; PERINEURAL
Status: DISCONTINUED
Start: 2019-01-16 | End: 2019-01-16 | Stop reason: HOSPADM

## 2019-01-16 RX ORDER — SODIUM CHLORIDE 9 MG/ML
25 INJECTION, SOLUTION INTRAVENOUS CONTINUOUS
Status: DISCONTINUED | OUTPATIENT
Start: 2019-01-16 | End: 2019-01-16 | Stop reason: HOSPADM

## 2019-01-16 RX ORDER — FLUMAZENIL 0.1 MG/ML
0.2 INJECTION INTRAVENOUS
Status: DISCONTINUED | OUTPATIENT
Start: 2019-01-16 | End: 2019-01-16 | Stop reason: HOSPADM

## 2019-01-16 RX ORDER — IPRATROPIUM BROMIDE AND ALBUTEROL SULFATE 2.5; .5 MG/3ML; MG/3ML
3 SOLUTION RESPIRATORY (INHALATION)
Status: COMPLETED | OUTPATIENT
Start: 2019-01-16 | End: 2019-01-16

## 2019-01-16 RX ORDER — NALOXONE HYDROCHLORIDE 0.4 MG/ML
INJECTION, SOLUTION INTRAMUSCULAR; INTRAVENOUS; SUBCUTANEOUS
Status: DISCONTINUED
Start: 2019-01-16 | End: 2019-01-16 | Stop reason: WASHOUT

## 2019-01-16 RX ORDER — FLUMAZENIL 0.1 MG/ML
INJECTION INTRAVENOUS
Status: DISCONTINUED
Start: 2019-01-16 | End: 2019-01-16 | Stop reason: WASHOUT

## 2019-01-16 RX ORDER — HYDRALAZINE HYDROCHLORIDE 20 MG/ML
10 INJECTION INTRAMUSCULAR; INTRAVENOUS ONCE
Status: COMPLETED | OUTPATIENT
Start: 2019-01-16 | End: 2019-01-16

## 2019-01-16 RX ORDER — NALOXONE HYDROCHLORIDE 0.4 MG/ML
0.1 INJECTION, SOLUTION INTRAMUSCULAR; INTRAVENOUS; SUBCUTANEOUS AS NEEDED
Status: DISCONTINUED | OUTPATIENT
Start: 2019-01-16 | End: 2019-01-16 | Stop reason: HOSPADM

## 2019-01-16 RX ORDER — MIDAZOLAM HYDROCHLORIDE 1 MG/ML
.5-4 INJECTION, SOLUTION INTRAMUSCULAR; INTRAVENOUS
Status: DISCONTINUED | OUTPATIENT
Start: 2019-01-16 | End: 2019-01-16 | Stop reason: HOSPADM

## 2019-01-16 RX ORDER — MIDAZOLAM HYDROCHLORIDE 1 MG/ML
INJECTION, SOLUTION INTRAMUSCULAR; INTRAVENOUS
Status: COMPLETED
Start: 2019-01-16 | End: 2019-01-16

## 2019-01-16 RX ORDER — HYDRALAZINE HYDROCHLORIDE 20 MG/ML
INJECTION INTRAMUSCULAR; INTRAVENOUS
Status: COMPLETED
Start: 2019-01-16 | End: 2019-01-16

## 2019-01-16 RX ADMIN — HYDRALAZINE HYDROCHLORIDE 10 MG: 20 INJECTION INTRAMUSCULAR; INTRAVENOUS at 11:15

## 2019-01-16 RX ADMIN — HYDRALAZINE HYDROCHLORIDE 10 MG: 20 INJECTION INTRAMUSCULAR; INTRAVENOUS at 11:20

## 2019-01-16 RX ADMIN — MIDAZOLAM HYDROCHLORIDE 1 MG: 1 INJECTION, SOLUTION INTRAMUSCULAR; INTRAVENOUS at 11:09

## 2019-01-16 RX ADMIN — FENTANYL CITRATE 50 MCG: 50 INJECTION, SOLUTION INTRAMUSCULAR; INTRAVENOUS at 11:02

## 2019-01-16 RX ADMIN — SODIUM CHLORIDE 25 ML/HR: 900 INJECTION, SOLUTION INTRAVENOUS at 09:59

## 2019-01-16 RX ADMIN — IPRATROPIUM BROMIDE AND ALBUTEROL SULFATE 3 ML: .5; 3 SOLUTION RESPIRATORY (INHALATION) at 11:50

## 2019-01-16 RX ADMIN — FENTANYL CITRATE 50 MCG: 50 INJECTION, SOLUTION INTRAMUSCULAR; INTRAVENOUS at 11:09

## 2019-01-16 RX ADMIN — MIDAZOLAM HYDROCHLORIDE 1 MG: 1 INJECTION, SOLUTION INTRAMUSCULAR; INTRAVENOUS at 11:12

## 2019-01-16 NOTE — H&P
The patient is an appropriate candidate to undergo CT renal biopsy. Patient assessed immediately prior to induction. Anesthesia plan as follows: Moderate Sedation. Planned agent(s):  fentanyl and versed    ASA Score:  ASA 2 - Mild systemic disease    History and Physical update:  H&P was reviewed and the patient was examined. No changes have occurred in the patient's condition since the H&P was completed.     Fred Peña MD  Vascular & Interventional Radiology  [facility]  1/16/2019

## 2019-01-16 NOTE — PROGRESS NOTES
Pt is all prepped and ready for procedure. 1130 Pt back to care unit but procedure was not done due to high blood pressure. Hydralazine 20 was given in IR. Procedure will be re-scheduled. Pt awake and alert and very anxious, also requested a neb treatment , she uses neb TX at home. Dr Kapil Grove called and T.O for neb TX placed and R.T called. 1145 B/P 122/106     1150 141/78, much calmer. .  1155 Neb tx adm and pt voiced much relief. .    7355 pt discharge home with family in stable condition  and educated about next schedule to take her meds prior to procedure for better b/p control.

## 2019-01-16 NOTE — PROCEDURES
Pt here for medical-renal CT guided biopsy. She did not take any of her anti-hypertensive medications this morning. Upon arrival to CT her BP is in the 160/100's range. After giving her some sedation and antihypertensives intravenously, we still were unable to get her BP down to an optimal range. Explained this to her. Will need to reschedule as this is too high for elective kidney biopsy. May consider admission beforehand to achieve this.          Ramila Barone MD  Vascular & Interventional Radiology  [facility]  1/16/2019

## 2019-01-16 NOTE — PROGRESS NOTES
Pt arrived on unit; Pt Alert and Oriented; Consent signed; See MAR sedation report and/or vital signs. Pt transferred to treatment table for procedure.

## 2019-01-16 NOTE — PROGRESS NOTES
Patient very anxious and unable to control BP with sedation and Hydralazine IV. Biopsy cancelled for patient safety. Explained to patient about taking BP meds and anxiety meds day of procedure. Report to [facility] [place] to follow up with Dr Sanjay Auguste.

## 2019-01-24 NOTE — PROGRESS NOTES
PT aware of NPO status. PT aware to take medications for BP and anxiety. PT plans to call PMD for extra dose for anxiety for procedure. PT aware of potential for sedation administration and need for  at discharge. PT aware of arrival time pre procedure. PT has 1100 procedure and will arrive at [address] states no questions at this time.

## 2019-01-31 ENCOUNTER — HOSPITAL ENCOUNTER (OUTPATIENT)
Dept: CT IMAGING | Age: 54
Discharge: HOME | End: 2019-01-31
Attending: RADIOLOGY | Admitting: RADIOLOGY
Payer: MEDICARE

## 2019-01-31 VITALS
RESPIRATION RATE: 20 BRPM | WEIGHT: 230.25 LBS | DIASTOLIC BLOOD PRESSURE: 84 MMHG | BODY MASS INDEX: 36.14 KG/M2 | OXYGEN SATURATION: 98 % | HEIGHT: 67 IN | HEART RATE: 105 BPM | TEMPERATURE: 98.2 F | SYSTOLIC BLOOD PRESSURE: 148 MMHG

## 2019-01-31 LAB — GLUCOSE BLD STRIP.AUTO-MCNC: 500 MG/DL (ref 70–110)

## 2019-01-31 PROCEDURE — 99152 MOD SED SAME PHYS/QHP 5/>YRS: CPT

## 2019-01-31 PROCEDURE — 74011250636 HC RX REV CODE- 250/636

## 2019-01-31 PROCEDURE — 88305 TISSUE EXAM BY PATHOLOGIST: CPT

## 2019-01-31 PROCEDURE — 88350 IMFLUOR EA ADDL 1ANTB STN PX: CPT

## 2019-01-31 PROCEDURE — 99153 MOD SED SAME PHYS/QHP EA: CPT

## 2019-01-31 PROCEDURE — 74011000250 HC RX REV CODE- 250: Performed by: RADIOLOGY

## 2019-01-31 PROCEDURE — 82962 GLUCOSE BLOOD TEST: CPT

## 2019-01-31 PROCEDURE — 88313 SPECIAL STAINS GROUP 2: CPT

## 2019-01-31 PROCEDURE — 74011250636 HC RX REV CODE- 250/636: Performed by: RADIOLOGY

## 2019-01-31 PROCEDURE — 88348 ELECTRON MICROSCOPY DX: CPT

## 2019-01-31 PROCEDURE — 88346 IMFLUOR 1ST 1ANTB STAIN PX: CPT

## 2019-01-31 PROCEDURE — 74011250637 HC RX REV CODE- 250/637: Performed by: RADIOLOGY

## 2019-01-31 PROCEDURE — 50200 RENAL BIOPSY PERQ: CPT

## 2019-01-31 RX ORDER — FENTANYL CITRATE 50 UG/ML
25-200 INJECTION, SOLUTION INTRAMUSCULAR; INTRAVENOUS
Status: DISCONTINUED | OUTPATIENT
Start: 2019-01-31 | End: 2019-01-31 | Stop reason: HOSPADM

## 2019-01-31 RX ORDER — FLUMAZENIL 0.1 MG/ML
INJECTION INTRAVENOUS
Status: DISCONTINUED
Start: 2019-01-31 | End: 2019-01-31 | Stop reason: WASHOUT

## 2019-01-31 RX ORDER — LIDOCAINE HYDROCHLORIDE 10 MG/ML
1-20 INJECTION INFILTRATION; PERINEURAL
Status: DISCONTINUED | OUTPATIENT
Start: 2019-01-31 | End: 2019-01-31 | Stop reason: HOSPADM

## 2019-01-31 RX ORDER — MIDAZOLAM HYDROCHLORIDE 1 MG/ML
INJECTION, SOLUTION INTRAMUSCULAR; INTRAVENOUS
Status: COMPLETED
Start: 2019-01-31 | End: 2019-01-31

## 2019-01-31 RX ORDER — LIDOCAINE HYDROCHLORIDE 10 MG/ML
INJECTION, SOLUTION EPIDURAL; INFILTRATION; INTRACAUDAL; PERINEURAL
Status: COMPLETED
Start: 2019-01-31 | End: 2019-01-31

## 2019-01-31 RX ORDER — IPRATROPIUM BROMIDE AND ALBUTEROL SULFATE 2.5; .5 MG/3ML; MG/3ML
3 SOLUTION RESPIRATORY (INHALATION)
Status: COMPLETED | OUTPATIENT
Start: 2019-01-31 | End: 2019-01-31

## 2019-01-31 RX ORDER — MIDAZOLAM HYDROCHLORIDE 1 MG/ML
.5-4 INJECTION, SOLUTION INTRAMUSCULAR; INTRAVENOUS
Status: DISCONTINUED | OUTPATIENT
Start: 2019-01-31 | End: 2019-01-31 | Stop reason: HOSPADM

## 2019-01-31 RX ORDER — HYDRALAZINE HYDROCHLORIDE 20 MG/ML
10 INJECTION INTRAMUSCULAR; INTRAVENOUS ONCE
Status: COMPLETED | OUTPATIENT
Start: 2019-01-31 | End: 2019-01-31

## 2019-01-31 RX ORDER — NALOXONE HYDROCHLORIDE 0.4 MG/ML
INJECTION, SOLUTION INTRAMUSCULAR; INTRAVENOUS; SUBCUTANEOUS
Status: DISCONTINUED
Start: 2019-01-31 | End: 2019-01-31 | Stop reason: WASHOUT

## 2019-01-31 RX ORDER — SODIUM CHLORIDE 9 MG/ML
25 INJECTION, SOLUTION INTRAVENOUS CONTINUOUS
Status: DISCONTINUED | OUTPATIENT
Start: 2019-01-31 | End: 2019-01-31 | Stop reason: HOSPADM

## 2019-01-31 RX ORDER — HYDRALAZINE HYDROCHLORIDE 20 MG/ML
INJECTION INTRAMUSCULAR; INTRAVENOUS
Status: COMPLETED
Start: 2019-01-31 | End: 2019-01-31

## 2019-01-31 RX ORDER — NALOXONE HYDROCHLORIDE 0.4 MG/ML
0.1 INJECTION, SOLUTION INTRAMUSCULAR; INTRAVENOUS; SUBCUTANEOUS AS NEEDED
Status: DISCONTINUED | OUTPATIENT
Start: 2019-01-31 | End: 2019-01-31 | Stop reason: HOSPADM

## 2019-01-31 RX ORDER — FENTANYL CITRATE 50 UG/ML
INJECTION, SOLUTION INTRAMUSCULAR; INTRAVENOUS
Status: COMPLETED
Start: 2019-01-31 | End: 2019-01-31

## 2019-01-31 RX ORDER — HYDROCODONE BITARTRATE AND ACETAMINOPHEN 5; 325 MG/1; MG/1
1 TABLET ORAL
Status: DISCONTINUED | OUTPATIENT
Start: 2019-01-31 | End: 2019-01-31 | Stop reason: HOSPADM

## 2019-01-31 RX ORDER — FLUMAZENIL 0.1 MG/ML
0.2 INJECTION INTRAVENOUS
Status: DISCONTINUED | OUTPATIENT
Start: 2019-01-31 | End: 2019-01-31 | Stop reason: HOSPADM

## 2019-01-31 RX ADMIN — IPRATROPIUM BROMIDE AND ALBUTEROL SULFATE 3 ML: .5; 3 SOLUTION RESPIRATORY (INHALATION) at 12:00

## 2019-01-31 RX ADMIN — Medication 10 ML: at 11:00

## 2019-01-31 RX ADMIN — HYDRALAZINE HYDROCHLORIDE 10 MG: 20 INJECTION INTRAMUSCULAR; INTRAVENOUS at 11:35

## 2019-01-31 RX ADMIN — SODIUM CHLORIDE 25 ML/HR: 900 INJECTION, SOLUTION INTRAVENOUS at 10:28

## 2019-01-31 RX ADMIN — MIDAZOLAM HYDROCHLORIDE 1 MG: 1 INJECTION, SOLUTION INTRAMUSCULAR; INTRAVENOUS at 11:20

## 2019-01-31 RX ADMIN — FENTANYL CITRATE 50 MCG: 50 INJECTION, SOLUTION INTRAMUSCULAR; INTRAVENOUS at 11:20

## 2019-01-31 RX ADMIN — FENTANYL CITRATE 50 MCG: 50 INJECTION, SOLUTION INTRAMUSCULAR; INTRAVENOUS at 11:16

## 2019-01-31 RX ADMIN — MIDAZOLAM HYDROCHLORIDE 1 MG: 1 INJECTION, SOLUTION INTRAMUSCULAR; INTRAVENOUS at 11:16

## 2019-01-31 RX ADMIN — HYDROCODONE BITARTRATE AND ACETAMINOPHEN 1 TABLET: 5; 325 TABLET ORAL at 12:29

## 2019-01-31 NOTE — H&P
The patient is an appropriate candidate to undergo medical renal  Biopsy. Of note her POC blood glucose came back at a critical alert of 500. I do not see this being a contraindication to perform renal biopsy. Spoke w ER (Mike Chowdhury and Dr Mendel Heal) who agree as long as she is not having any major symptomatology that they don't see any issue, just needs tighter control and close followup. Spoke w Dr Imtiaz Noland, ordering nephrology, and he agrees w above as well. Patient assessed immediately prior to induction. Anesthesia plan as follows:   Conscious Sedation. Planned agent(s):  fentanyl and versed    ASA Score:  ASA 2 - Mild systemic disease    History and Physical update:  H&P was reviewed and the patient was examined. No changes have occurred in the patient's condition since the H&P was completed.     Akash Peterson MD  Vascular & Interventional Radiology  [address] [place] [address] [facility]  1/31/2019

## 2019-01-31 NOTE — DISCHARGE INSTRUCTIONS
Patient Education        Needle Kidney Biopsy: What to Expect at Home  Your Recovery    After a needle kidney biopsy, you will be told to lie down on your back for several hours. After this, you should avoid strenuous activity for the next 2 to 3 days. It's normal to feel some soreness in the area of the biopsy for 2 to 3 days. You may have a small amount of bleeding on the bandage after the biopsy. You may notice some blood in your urine after the biopsy. This should go away within 12 to 24 hours. If it doesn't, call your doctor. If you are feeling well, you should be able to get back to work within a week. But avoid strenuous activity, such as heavy lifting, for up to another week. This care sheet gives you a general idea about how long it will take for you to recover. But each person recovers at a different pace. Follow the steps below to feel better as quickly as possible. How can you care for yourself at home? Activity    · Avoid lifting anything that would make you strain. This may include heavy grocery bags and milk containers, a heavy briefcase or backpack, cat litter or dog food bags, a vacuum , or a child.     · Avoid strenuous activities, such as bicycle riding, jogging, weight lifting, or aerobic exercise, until your doctor says it is okay.     · Try to walk each day. Start by walking a little more than you did the day before. Bit by bit, increase the amount you walk. Walking boosts blood flow and helps prevent pneumonia and constipation.     · Rest when you feel tired. Getting enough sleep will help you recover.     · Ask your doctor when it is okay for you to have sex. Diet    · You can eat your normal diet. If your stomach is upset, try bland, low-fat foods like plain rice, broiled chicken, toast, and yogurt.     · Drink plenty of fluids to avoid becoming dehydrated. Medicines    · Your doctor will tell you if and when you can restart your medicines.  He or she will also give you instructions about taking any new medicines.     · If you take blood thinners, such as warfarin (Coumadin), clopidogrel (Plavix), or aspirin, be sure to talk to your doctor. He or she will tell you if and when to start taking those medicines again. Make sure that you understand exactly what your doctor wants you to do.     · Do not take aspirin or anti-inflammatory medicines for a week after the biopsy. Incision care    · Keep a bandage over the puncture site for the first 1 or 2 days. Follow-up care is a key part of your treatment and safety. Be sure to make and go to all appointments, and call your doctor if you are having problems. It's also a good idea to know your test results and keep a list of the medicines you take. When should you call for help? Call 911 anytime you think you may need emergency care. For example, call if:    · You passed out (lost consciousness).    Call your doctor now or seek immediate medical care if:    · You have signs of infection, such as:  ? Increased pain, swelling, warmth, or redness. ? Red streaks leading from the puncture site. ? Pus draining from the puncture site. ? A fever.     · Bright red blood has soaked through the bandage over the puncture site.     · You have new or worse pain at the puncture site.    Watch closely for any changes in your health, and call your doctor if:    · You have blood in your urine for more than 1 day. Where can you learn more? Go to http://otilio-binh.info/. Enter S675 in the search box to learn more about \"Needle Kidney Biopsy: What to Expect at Home. \"  Current as of: March 14, 2018  Content Version: 11.9  © 7435-3838 [employer]. Care instructions adapted under license by [employer] (which disclaims liability or warranty for this information).  If you have questions about a medical condition or this instruction, always ask your healthcare professional. [address]  any warranty or liability for your use of this information. DISCHARGE SUMMARY from Nurse    PATIENT INSTRUCTIONS:    After general anesthesia or intravenous sedation, for 24 hours or while taking prescription Narcotics:  · Limit your activities  · Do not drive and operate hazardous machinery  · Do not make important personal or business decisions  · Do  not drink alcoholic beverages  · If you have not urinated within 8 hours after discharge, please contact your surgeon on call. Report the following to your surgeon:  · Excessive pain, swelling, redness or odor of or around the surgical area  · Temperature over 100.5  · Nausea and vomiting lasting longer than 4 hours or if unable to take medications  · Any signs of decreased circulation or nerve impairment to extremity: change in color, persistent  numbness, tingling, coldness or increase pain  · Any questions    What to do at Home:  Recommended activity: Activity as tolerated,       *  Please give a list of your current medications to your Primary Care Provider. *  Please update this list whenever your medications are discontinued, doses are      changed, or new medications (including over-the-counter products) are added. *  Please carry medication information at all times in case of emergency situations. These are general instructions for a healthy lifestyle:    No smoking/ No tobacco products/ Avoid exposure to second hand smoke  Surgeon General's Warning:  Quitting smoking now greatly reduces serious risk to your health.     Obesity, smoking, and sedentary lifestyle greatly increases your risk for illness    A healthy diet, regular physical exercise & weight monitoring are important for maintaining a healthy lifestyle    You may be retaining fluid if you have a history of heart failure or if you experience any of the following symptoms:  Weight gain of 3 pounds or more overnight or 5 pounds in a week, increased swelling in our hands or feet or shortness of breath while lying flat in bed. Please call your doctor as soon as you notice any of these symptoms; do not wait until your next office visit. Recognize signs and symptoms of STROKE:    F-face looks uneven    A-arms unable to move or move unevenly    S-speech slurred or non-existent    T-time-call 911 as soon as signs and symptoms begin-DO NOT go       Back to bed or wait to see if you get better-TIME IS BRAIN. Warning Signs of HEART ATTACK     Call 911 if you have these symptoms:   Chest discomfort. Most heart attacks involve discomfort in the center of the chest that lasts more than a few minutes, or that goes away and comes back. It can feel like uncomfortable pressure, squeezing, fullness, or pain.  Discomfort in other areas of the upper body. Symptoms can include pain or discomfort in one or both arms, the back, neck, jaw, or stomach.  Shortness of breath with or without chest discomfort.  Other signs may include breaking out in a cold sweat, nausea, or lightheadedness. Don't wait more than five minutes to call 911 - MINUTES MATTER! Fast action can save your life. Calling 911 is almost always the fastest way to get lifesaving treatment. Emergency Medical Services staff can begin treatment when they arrive -- up to an hour sooner than if someone gets to the hospital by car. The discharge information has been reviewed with the patient and spouse. The patient and spouse verbalized understanding. Discharge medications reviewed with the patient and spouse and appropriate educational materials and side effects teaching were provided.       Patient armband removed and shredded    ___________________________________________________________________________________________________________________________________

## 2019-01-31 NOTE — PROGRESS NOTES
Pt arrived on unit; Pt Alert and Oriented; Consent signed; See flowsheets and MAR for sedation report and/or vital signs. Pt transferred to treatment table for procedure.

## 2019-01-31 NOTE — PROGRESS NOTES
TRANSFER - OUT REPORT:    Verbal report given to Noel Scherer RN(name) on Skyla Rader  being transferred to Care Unit(unit) for routine progression of care       Report consisted of patients Situation, Background, Assessment and   Recommendations(SBAR). Information from the following report(s) Kardex, Procedure Summary and MAR was reviewed with the receiving nurse. Lines:   Peripheral IV 01/31/19 Anterior;Right Forearm (Active)   Site Assessment Clean, dry, & intact 1/31/2019 10:28 AM   Phlebitis Assessment 0 1/31/2019 10:28 AM   Infiltration Assessment 0 1/31/2019 10:28 AM   Dressing Status Clean, dry, & intact 1/31/2019 10:28 AM   Dressing Type Tape;Transparent 1/31/2019 10:28 AM   Hub Color/Line Status Flushed; Infusing;Capped;Blue 1/31/2019 10:28 AM   Action Taken Open ports on tubing capped 1/31/2019 10:28 AM        Opportunity for questions and clarification was provided. Updaated on respiratory status. Patient has asthma and is requesting duo neb tx. Order placed per Dr Olivia Ahuja and Chandni Banks updated and called respiratory for tx.     Patient transported with:   Registered Nurse

## 2019-01-31 NOTE — PROCEDURES
Vascular & Interventional Radiology Brief Procedure Note    Interventional Radiologist: Bevelyn Galeazzi, MD    Pre-operative Diagnosis:  proteinuria    Post-operative Diagnosis: Same as pre-op dx    Procedure(s) Performed:  Renal biopsy    Anesthesia:  Local and Moderate Sedation    Findings:  Uncomplicated CT biopsy of left renal lower pole cortex. Complications: None    Estimated Blood Loss:  minimal    Tubes and Drains: None    Specimens: 3, 16g cores.      Condition: Good       Bevelyn Galeazzi, MD  [facility]  Vascular & Interventional Radiology  1/31/2019

## 2019-01-31 NOTE — PROGRESS NOTES
Pt is all prepped and ready for procedure. BS in the 500's , IT team aware. 1200 Pt back to care unit S/P renal biopsy. Pt noted wheezing and having SOB, SPO2 96% on RA. Band-Aid to lt lower back dry and intact. Lung ausculted and wheezing comfirmed    Neb treatment  per Dr Lennie Villela IR ordered and  RT called for neb tx    80 Pt used own neb TX   1230 Breathing better, wheezing relieved. Resting well    1330 Pt discharged home in the care of daughter. All instructions reviewed and understandings verbalized well. Pt escorted to car and left with daughter in stable condition with no complaints.

## 2022-08-17 ENCOUNTER — TRANSCRIBE ORDER (OUTPATIENT)
Dept: SCHEDULING | Age: 57
End: 2022-08-17

## 2022-08-17 DIAGNOSIS — M79.604 PAIN AND SWELLING OF RIGHT LOWER EXTREMITY: Primary | ICD-10-CM

## 2022-08-17 DIAGNOSIS — M79.89 PAIN AND SWELLING OF RIGHT LOWER EXTREMITY: Primary | ICD-10-CM

## 2022-08-18 ENCOUNTER — HOSPITAL ENCOUNTER (OUTPATIENT)
Dept: VASCULAR SURGERY | Age: 57
Discharge: HOME | End: 2022-08-18
Attending: INTERNAL MEDICINE
Payer: MEDICARE

## 2022-08-18 ENCOUNTER — HOSPITAL ENCOUNTER (OUTPATIENT)
Age: 57
Setting detail: OBSERVATION
Discharge: HOME | End: 2022-08-22
Attending: EMERGENCY MEDICINE | Admitting: INTERNAL MEDICINE
Payer: MEDICARE

## 2022-08-18 ENCOUNTER — APPOINTMENT (OUTPATIENT)
Dept: GENERAL RADIOLOGY | Age: 57
End: 2022-08-18
Attending: PHYSICIAN ASSISTANT
Payer: MEDICARE

## 2022-08-18 DIAGNOSIS — M79.89 PAIN AND SWELLING OF RIGHT LOWER EXTREMITY: ICD-10-CM

## 2022-08-18 DIAGNOSIS — M79.604 PAIN AND SWELLING OF RIGHT LOWER EXTREMITY: ICD-10-CM

## 2022-08-18 DIAGNOSIS — L03.115 CELLULITIS OF RIGHT LOWER EXTREMITY: Primary | ICD-10-CM

## 2022-08-18 PROBLEM — L03.90 CELLULITIS: Status: ACTIVE | Noted: 2022-08-18

## 2022-08-18 LAB
ALBUMIN SERPL-MCNC: 3.5 G/DL (ref 3.4–5)
ALBUMIN/GLOB SERPL: 0.7 {RATIO} (ref 0.8–1.7)
ALP SERPL-CCNC: 92 U/L (ref 45–117)
ALT SERPL-CCNC: 11 U/L (ref 13–56)
ANION GAP SERPL CALC-SCNC: 6 MMOL/L (ref 3–18)
AST SERPL-CCNC: 22 U/L (ref 10–38)
ATRIAL RATE: 114 BPM
BASOPHILS # BLD: 0.1 K/UL (ref 0–0.1)
BASOPHILS NFR BLD: 1 % (ref 0–2)
BILIRUB SERPL-MCNC: 0.4 MG/DL (ref 0.2–1)
BUN SERPL-MCNC: 44 MG/DL (ref 7–18)
BUN/CREAT SERPL: 35 (ref 12–20)
CALCIUM SERPL-MCNC: 9.1 MG/DL (ref 8.5–10.1)
CALCULATED P AXIS, ECG09: 59 DEGREES
CALCULATED R AXIS, ECG10: 9 DEGREES
CALCULATED T AXIS, ECG11: 45 DEGREES
CHLORIDE SERPL-SCNC: 103 MMOL/L (ref 100–111)
CO2 SERPL-SCNC: 26 MMOL/L (ref 21–32)
CREAT SERPL-MCNC: 1.27 MG/DL (ref 0.6–1.3)
DIAGNOSIS, 93000: NORMAL
DIFFERENTIAL METHOD BLD: ABNORMAL
EOSINOPHIL # BLD: 0.4 K/UL (ref 0–0.4)
EOSINOPHIL NFR BLD: 3 % (ref 0–5)
ERYTHROCYTE [DISTWIDTH] IN BLOOD BY AUTOMATED COUNT: 13.4 % (ref 11.6–14.5)
EST. AVERAGE GLUCOSE BLD GHB EST-MCNC: 206 MG/DL
GLOBULIN SER CALC-MCNC: 5.3 G/DL (ref 2–4)
GLUCOSE BLD STRIP.AUTO-MCNC: 304 MG/DL (ref 70–110)
GLUCOSE SERPL-MCNC: 226 MG/DL (ref 74–99)
HBA1C MFR BLD: 8.8 % (ref 4.2–5.6)
HCT VFR BLD AUTO: 29.9 % (ref 35–45)
HGB BLD-MCNC: 9.3 G/DL (ref 12–16)
IMM GRANULOCYTES # BLD AUTO: 0 K/UL (ref 0–0.04)
IMM GRANULOCYTES NFR BLD AUTO: 0 % (ref 0–0.5)
LACTATE SERPL-SCNC: 1.5 MMOL/L (ref 0.4–2)
LYMPHOCYTES # BLD: 2.3 K/UL (ref 0.9–3.6)
LYMPHOCYTES NFR BLD: 22 % (ref 21–52)
MCH RBC QN AUTO: 27 PG (ref 24–34)
MCHC RBC AUTO-ENTMCNC: 31.1 G/DL (ref 31–37)
MCV RBC AUTO: 86.7 FL (ref 78–100)
MONOCYTES # BLD: 0.6 K/UL (ref 0.05–1.2)
MONOCYTES NFR BLD: 6 % (ref 3–10)
NEUTS SEG # BLD: 7.2 K/UL (ref 1.8–8)
NEUTS SEG NFR BLD: 68 % (ref 40–73)
NRBC # BLD: 0 K/UL (ref 0–0.01)
NRBC BLD-RTO: 0 PER 100 WBC
P-R INTERVAL, ECG05: 118 MS
PLATELET # BLD AUTO: 246 K/UL (ref 135–420)
PMV BLD AUTO: 10.6 FL (ref 9.2–11.8)
POTASSIUM SERPL-SCNC: 4.8 MMOL/L (ref 3.5–5.5)
PROT SERPL-MCNC: 8.8 G/DL (ref 6.4–8.2)
Q-T INTERVAL, ECG07: 332 MS
QRS DURATION, ECG06: 104 MS
QTC CALCULATION (BEZET), ECG08: 457 MS
RBC # BLD AUTO: 3.45 M/UL (ref 4.2–5.3)
SODIUM SERPL-SCNC: 135 MMOL/L (ref 136–145)
VENTRICULAR RATE, ECG03: 114 BPM
WBC # BLD AUTO: 10.6 K/UL (ref 4.6–13.2)

## 2022-08-18 PROCEDURE — 74011250636 HC RX REV CODE- 250/636: Performed by: INTERNAL MEDICINE

## 2022-08-18 PROCEDURE — G0378 HOSPITAL OBSERVATION PER HR: HCPCS

## 2022-08-18 PROCEDURE — 87186 SC STD MICRODIL/AGAR DIL: CPT

## 2022-08-18 PROCEDURE — 83605 ASSAY OF LACTIC ACID: CPT

## 2022-08-18 PROCEDURE — 87040 BLOOD CULTURE FOR BACTERIA: CPT

## 2022-08-18 PROCEDURE — 74011250637 HC RX REV CODE- 250/637: Performed by: PHYSICIAN ASSISTANT

## 2022-08-18 PROCEDURE — 87077 CULTURE AEROBIC IDENTIFY: CPT

## 2022-08-18 PROCEDURE — 94640 AIRWAY INHALATION TREATMENT: CPT

## 2022-08-18 PROCEDURE — 85025 COMPLETE CBC W/AUTO DIFF WBC: CPT

## 2022-08-18 PROCEDURE — 74011250636 HC RX REV CODE- 250/636: Performed by: PHYSICIAN ASSISTANT

## 2022-08-18 PROCEDURE — 65270000046 HC RM TELEMETRY

## 2022-08-18 PROCEDURE — 83036 HEMOGLOBIN GLYCOSYLATED A1C: CPT

## 2022-08-18 PROCEDURE — 74011250637 HC RX REV CODE- 250/637: Performed by: INTERNAL MEDICINE

## 2022-08-18 PROCEDURE — 82962 GLUCOSE BLOOD TEST: CPT

## 2022-08-18 PROCEDURE — 87205 SMEAR GRAM STAIN: CPT

## 2022-08-18 PROCEDURE — 73660 X-RAY EXAM OF TOE(S): CPT

## 2022-08-18 PROCEDURE — 93005 ELECTROCARDIOGRAM TRACING: CPT

## 2022-08-18 PROCEDURE — 74011000250 HC RX REV CODE- 250: Performed by: INTERNAL MEDICINE

## 2022-08-18 PROCEDURE — 74011636637 HC RX REV CODE- 636/637: Performed by: INTERNAL MEDICINE

## 2022-08-18 PROCEDURE — 93971 EXTREMITY STUDY: CPT

## 2022-08-18 PROCEDURE — 71045 X-RAY EXAM CHEST 1 VIEW: CPT

## 2022-08-18 PROCEDURE — 96374 THER/PROPH/DIAG INJ IV PUSH: CPT

## 2022-08-18 PROCEDURE — 96375 TX/PRO/DX INJ NEW DRUG ADDON: CPT

## 2022-08-18 PROCEDURE — 99285 EMERGENCY DEPT VISIT HI MDM: CPT

## 2022-08-18 PROCEDURE — 80053 COMPREHEN METABOLIC PANEL: CPT

## 2022-08-18 RX ORDER — IPRATROPIUM BROMIDE AND ALBUTEROL SULFATE 2.5; .5 MG/3ML; MG/3ML
SOLUTION RESPIRATORY (INHALATION)
Status: DISPENSED
Start: 2022-08-18 | End: 2022-08-19

## 2022-08-18 RX ORDER — MORPHINE SULFATE 2 MG/ML
2 INJECTION, SOLUTION INTRAMUSCULAR; INTRAVENOUS
Status: DISCONTINUED | OUTPATIENT
Start: 2022-08-18 | End: 2022-08-22 | Stop reason: HOSPADM

## 2022-08-18 RX ORDER — ZOLPIDEM TARTRATE 5 MG/1
10 TABLET ORAL
Status: DISCONTINUED | OUTPATIENT
Start: 2022-08-18 | End: 2022-08-22 | Stop reason: HOSPADM

## 2022-08-18 RX ORDER — BUDESONIDE 0.5 MG/2ML
500 INHALANT ORAL
Status: DISCONTINUED | OUTPATIENT
Start: 2022-08-18 | End: 2022-08-22 | Stop reason: HOSPADM

## 2022-08-18 RX ORDER — PANTOPRAZOLE SODIUM 40 MG/1
40 TABLET, DELAYED RELEASE ORAL
Status: DISCONTINUED | OUTPATIENT
Start: 2022-08-19 | End: 2022-08-22 | Stop reason: HOSPADM

## 2022-08-18 RX ORDER — DULOXETIN HYDROCHLORIDE 30 MG/1
30 CAPSULE, DELAYED RELEASE ORAL DAILY
Status: DISCONTINUED | OUTPATIENT
Start: 2022-08-19 | End: 2022-08-22 | Stop reason: HOSPADM

## 2022-08-18 RX ORDER — INSULIN LISPRO 100 [IU]/ML
INJECTION, SOLUTION INTRAVENOUS; SUBCUTANEOUS
Status: DISCONTINUED | OUTPATIENT
Start: 2022-08-18 | End: 2022-08-22 | Stop reason: HOSPADM

## 2022-08-18 RX ORDER — PREGABALIN 75 MG/1
150 CAPSULE ORAL 3 TIMES DAILY
Status: DISCONTINUED | OUTPATIENT
Start: 2022-08-18 | End: 2022-08-22 | Stop reason: HOSPADM

## 2022-08-18 RX ORDER — CLINDAMYCIN HYDROCHLORIDE 150 MG/1
300 CAPSULE ORAL
Status: DISCONTINUED | OUTPATIENT
Start: 2022-08-18 | End: 2022-08-18

## 2022-08-18 RX ORDER — INSULIN GLARGINE 100 [IU]/ML
0.3 INJECTION, SOLUTION SUBCUTANEOUS
Status: DISCONTINUED | OUTPATIENT
Start: 2022-08-18 | End: 2022-08-20

## 2022-08-18 RX ORDER — ALPRAZOLAM 0.5 MG/1
1 TABLET ORAL
Status: DISCONTINUED | OUTPATIENT
Start: 2022-08-18 | End: 2022-08-22 | Stop reason: HOSPADM

## 2022-08-18 RX ORDER — LISINOPRIL 5 MG/1
10 TABLET ORAL DAILY
Status: DISCONTINUED | OUTPATIENT
Start: 2022-08-19 | End: 2022-08-22 | Stop reason: HOSPADM

## 2022-08-18 RX ORDER — OXYCODONE AND ACETAMINOPHEN 5; 325 MG/1; MG/1
1 TABLET ORAL
Status: DISCONTINUED | OUTPATIENT
Start: 2022-08-18 | End: 2022-08-22 | Stop reason: HOSPADM

## 2022-08-18 RX ORDER — FUROSEMIDE 20 MG/1
20 TABLET ORAL DAILY
Status: DISCONTINUED | OUTPATIENT
Start: 2022-08-19 | End: 2022-08-22 | Stop reason: HOSPADM

## 2022-08-18 RX ORDER — INSULIN LISPRO 100 [IU]/ML
INJECTION, SOLUTION INTRAVENOUS; SUBCUTANEOUS
Status: DISCONTINUED | OUTPATIENT
Start: 2022-08-18 | End: 2022-08-19

## 2022-08-18 RX ORDER — HYDROCHLOROTHIAZIDE 25 MG/1
12.5 TABLET ORAL DAILY
Status: DISCONTINUED | OUTPATIENT
Start: 2022-08-19 | End: 2022-08-19

## 2022-08-18 RX ORDER — LISINOPRIL AND HYDROCHLOROTHIAZIDE 10; 12.5 MG/1; MG/1
1 TABLET ORAL DAILY
Status: DISCONTINUED | OUTPATIENT
Start: 2022-08-19 | End: 2022-08-18 | Stop reason: RX

## 2022-08-18 RX ORDER — MAGNESIUM SULFATE 100 %
4 CRYSTALS MISCELLANEOUS AS NEEDED
Status: DISCONTINUED | OUTPATIENT
Start: 2022-08-18 | End: 2022-08-19 | Stop reason: SDUPTHER

## 2022-08-18 RX ORDER — DEXTROSE MONOHYDRATE 100 MG/ML
0-250 INJECTION, SOLUTION INTRAVENOUS AS NEEDED
Status: DISCONTINUED | OUTPATIENT
Start: 2022-08-18 | End: 2022-08-19 | Stop reason: SDUPTHER

## 2022-08-18 RX ORDER — IPRATROPIUM BROMIDE AND ALBUTEROL SULFATE 2.5; .5 MG/3ML; MG/3ML
3 SOLUTION RESPIRATORY (INHALATION)
Status: DISCONTINUED | OUTPATIENT
Start: 2022-08-18 | End: 2022-08-19

## 2022-08-18 RX ORDER — ARFORMOTEROL TARTRATE 15 UG/2ML
15 SOLUTION RESPIRATORY (INHALATION)
Status: DISCONTINUED | OUTPATIENT
Start: 2022-08-18 | End: 2022-08-22 | Stop reason: HOSPADM

## 2022-08-18 RX ORDER — DEXTROSE MONOHYDRATE 100 MG/ML
0-250 INJECTION, SOLUTION INTRAVENOUS AS NEEDED
Status: DISCONTINUED | OUTPATIENT
Start: 2022-08-18 | End: 2022-08-22 | Stop reason: HOSPADM

## 2022-08-18 RX ORDER — IPRATROPIUM BROMIDE 0.5 MG/2.5ML
0.5 SOLUTION RESPIRATORY (INHALATION)
Status: DISCONTINUED | OUTPATIENT
Start: 2022-08-18 | End: 2022-08-19 | Stop reason: SDUPTHER

## 2022-08-18 RX ORDER — VANCOMYCIN 2 GRAM/500 ML IN 0.9 % SODIUM CHLORIDE INTRAVENOUS
2000 ONCE
Status: COMPLETED | OUTPATIENT
Start: 2022-08-18 | End: 2022-08-19

## 2022-08-18 RX ORDER — ALBUTEROL SULFATE 90 UG/1
2 AEROSOL, METERED RESPIRATORY (INHALATION)
Status: DISCONTINUED | OUTPATIENT
Start: 2022-08-18 | End: 2022-08-22 | Stop reason: HOSPADM

## 2022-08-18 RX ORDER — HYDROCODONE BITARTRATE AND ACETAMINOPHEN 5; 325 MG/1; MG/1
1 TABLET ORAL
Status: COMPLETED | OUTPATIENT
Start: 2022-08-18 | End: 2022-08-18

## 2022-08-18 RX ORDER — MAGNESIUM SULFATE 100 %
4 CRYSTALS MISCELLANEOUS AS NEEDED
Status: DISCONTINUED | OUTPATIENT
Start: 2022-08-18 | End: 2022-08-22 | Stop reason: HOSPADM

## 2022-08-18 RX ADMIN — SODIUM CHLORIDE 1000 ML: 9 INJECTION, SOLUTION INTRAVENOUS at 16:14

## 2022-08-18 RX ADMIN — ZOLPIDEM TARTRATE 10 MG: 5 TABLET ORAL at 22:09

## 2022-08-18 RX ADMIN — INSULIN LISPRO 8 UNITS: 100 INJECTION, SOLUTION INTRAVENOUS; SUBCUTANEOUS at 22:09

## 2022-08-18 RX ADMIN — AZITHROMYCIN MONOHYDRATE 500 MG: 500 INJECTION, POWDER, LYOPHILIZED, FOR SOLUTION INTRAVENOUS at 21:23

## 2022-08-18 RX ADMIN — Medication 2000 MG: at 22:20

## 2022-08-18 RX ADMIN — IPRATROPIUM BROMIDE AND ALBUTEROL SULFATE 3 ML: .5; 3 SOLUTION RESPIRATORY (INHALATION) at 18:46

## 2022-08-18 RX ADMIN — METHYLPREDNISOLONE SODIUM SUCCINATE 60 MG: 40 INJECTION, POWDER, FOR SOLUTION INTRAMUSCULAR; INTRAVENOUS at 23:59

## 2022-08-18 RX ADMIN — PREGABALIN 150 MG: 75 CAPSULE ORAL at 22:09

## 2022-08-18 RX ADMIN — MORPHINE SULFATE 2 MG: 2 INJECTION, SOLUTION INTRAMUSCULAR; INTRAVENOUS at 19:48

## 2022-08-18 RX ADMIN — INSULIN GLARGINE 33 UNITS: 100 INJECTION, SOLUTION SUBCUTANEOUS at 22:09

## 2022-08-18 RX ADMIN — HYDROCODONE BITARTRATE AND ACETAMINOPHEN 1 TABLET: 5; 325 TABLET ORAL at 15:23

## 2022-08-18 NOTE — H&P
History & Physical    Patient: Hoang Sanders MRN: [id number]  CSN: [id number]    YOB: 1965  Age: 49 y.o.   Sex: female      DOA: 2022    Chief Complaint:   Chief Complaint   Patient presents with    Wound Check          HPI:     Hoang Sanders is a 49 y.o.  female who has history of asthma, COPD, on home oxygen sleep apnea not using CPAP aneurysms presents with increased redness of her lower extremity she was started on outpatient antibiotics with no improvement she had been in the emergency room for several hours did not tell anyone that she uses home oxygen and she started to have respiratory distress including increased work of breathing and hypoxemia down to 87% chest x-ray was done on adm which did not show infiltrate or fluid patient states she just needed her oxygen she is given a stat DuoNeb with decreased work of breathing so she is changed from a [address] admission for cellulitis to a telemetry admission for cellulitis and COPD exacerbation tachycardia patient smokes about a pack a day she had planned on quitting   Patient been vaccinated and boosted from [facility] no exposures    Past Medical History:   Diagnosis Date    Aneurysm ([facility] [address]     brain (coiled)    Anxiety     Arthritis     generalized    Asthma     CAD (coronary artery disease) 2017    heart stents    COPD     Depression     depression and anxiety    Diabetes ([address] 2007    type 2    GERD (gastroesophageal reflux disease)     well controlled with meds    Hypertension     Ill-defined condition     Uses 02 at night 2L/min, and as needed in daytime    Other ill-defined conditions(799.89)     fibromyalgia    Stroke ([address] 2004       Past Surgical History:   Procedure Laterality Date    CARDIAC SURG PROCEDURE UNLIST  2017    2 stents    HX  SECTION      x 3    HX CHOLECYSTECTOMY      HX GYN      hysterectomy    HX HERNIA REPAIR  2013    abdominal     HX OTHER SURGICAL  2004    Brain surgery shunt, coiling       Family History   Problem Relation Age of Onset    Malignant Hyperthermia Neg Hx     Pseudocholinesterase Deficiency Neg Hx     Delayed Awakening Neg Hx     Post-op Nausea/Vomiting Neg Hx     Post-op Cognitive Dysfunction Neg Hx     Emergence Delirium Neg Hx        Social History     Socioeconomic History    Marital status: SINGLE   Tobacco Use    Smoking status: Every Day     Packs/day: 0.25     Years: 20.00     Pack years: 5.00     Types: Cigarettes    Smokeless tobacco: Never    Tobacco comments:     advised no smoking 24 h pre-op   Substance and Sexual Activity    Alcohol use: No    Drug use: No       Prior to Admission medications    Medication Sig Start Date End Date Taking? Authorizing Provider   lisinopril-hydroCHLOROthiazide (PRINZIDE, ZESTORETIC) 10-12.5 mg per tablet Take 1 Tab by mouth daily. Provider, Historical   tiotropium (SPIRIVA WITH HANDIHALER) 18 mcg inhalation capsule Take 1 Cap by inhalation daily. Provider, Historical   albuterol (PROVENTIL HFA, VENTOLIN HFA, PROAIR HFA) 90 mcg/actuation inhaler Take  by inhalation every four (4) hours as needed for Wheezing. Provider, Historical   zolpidem (AMBIEN) 10 mg tablet Take  by mouth nightly. Provider, Historical   ALPRAZolam (XANAX) 1 mg tablet Take 1 mg by mouth daily as needed. Indications: anxiety    Provider, Historical   furosemide (LASIX) 20 mg tablet Take 20 mg by mouth daily. Indications: Edema    Provider, Historical   pregabalin (LYRICA) 75 mg capsule Take 150 mg by mouth three (3) times daily. Provider, Historical   esomeprazole (NEXIUM) 40 mg capsule Take  by mouth daily. Instructed to take with sip of water DOS    Provider, Historical   albuterol-ipratropium (DUO-NEB) 2.5 mg-0.5 mg/3 ml nebulizer solution 3 mL by Nebulization route every four (4) hours. Indications: CHRONIC OBSTRUCTIVE PULMONARY DISEASE WITH BRONCHOSPASMS    Provider, Historical   roflumilast (DALIRESP) Tab tablet Take 500 mcg by mouth daily. Indications: COPD ASSOCIATED WITH CHRONIC BRONCHITIS    Provider, Historical   DULoxetine (CYMBALTA) 60 mg capsule Take 120 mg by mouth daily. Indications: ANXIETY WITH DEPRESSION    Provider, Historical       Allergies   Allergen Reactions    Sulfa (Sulfonamide Antibiotics) Hives, Rash and Itching         Review of Systems  GENERAL: Patient alert, awake and oriented times 3, able to communicate full sentences and not in distress. HEENT: No change in vision, no earache, tinnitus, sore throat or sinus congestion. NECK: No pain or stiffness. PULMONARY:+shortness of breath, cough+ wheeze. Cardiovascular: no pnd or orthopnea, no CP  GASTROINTESTINAL: No abdominal pain, nausea, vomiting or diarrhea, melena or bright red blood per rectum. GENITOURINARY: No urinary frequency, urgency, hesitancy or dysuria. MUSCULOSKELETAL: No joint or muscle pain, no back pain, no recent trauma. DERMATOLOGIC: No rash, no itching, no lesions. ENDOCRINE: No polyuria, polydipsia, no heat or cold intolerance. No recent change in weight. HEMATOLOGICAL: No anemia or easy bruising or bleeding. NEUROLOGIC: No headache, seizures, numbness, tingling or weakness. Physical Exam:     Physical Exam:  Visit Vitals  BP (!) 148/72   Pulse (!) 108   Temp 98.3 °F (36.8 °C)   Resp 18   Ht 5' 8\" (1.727 m)   Wt 109.8 kg (242 lb)   SpO2 100%   BMI 36.80 kg/m²      O2 Device: None (Room air)    Temp (24hrs), Av.3 °F (36.8 °C), Min:98.3 °F (36.8 °C), Max:98.3 °F (36.8 °C)    No intake/output data recorded. No intake/output data recorded. General:  Alert, cooperative, no distress, appears stated age. Head: Normocephalic, without obvious abnormality, atraumatic. Eyes:  Conjunctivae/corneas clear. PERRL, EOMs intact. Nose: Nares normal. No drainage or sinus tenderness. Neck: Supple, symmetrical, trachea midline, no adenopathy, thyroid: no enlargement, no carotid bruit and n+JVD.    Lungs:   Clear to auscultation bilaterally. diffuse expitory wheeze   Heart:  Regular rate and rhythm, S1, S2 normal.tachycarida     Abdomen: Soft, non-tender. Bowel sounds normal.    Extremities: Extremities normal, atraumatic, no cyanosis +3edema. Pulses: 2+ and symmetric all extremities. Skin:  No rashes or lesions             Neurologic: AAOx3, No focal motor or sensory deficit. Labs Reviewed: All lab results for the last 24 hours reviewed. and EKG    Procedures/imaging: see electronic medical records for all procedures/Xrays and details which were not copied into this note but were reviewed prior to creation of Plan      Assessment/Plan     1. Cellulitis  She started on Rocephin duplex of the leg is negative and arterial Doppler study will be ordered and podiatry was consulted through the ER areas marked  She is supposed to also get an MRI  2. COPD exacerbation  Patient is on home oxygen and ABGs ordered she supposed to be on CPAP but does not use  She started on IV Solu-Medrol Brovana Pulmicort and duo nebs  We will add a Zithromax to her Rocephin and vancomycin  3. Coronary artery disease with stents resume home medications  4. History of brain aneurysm and  shunt with coils  5. Tobacco disorder advised to quit does not want nicotine patch  6.   Diabetes start on Lantus and sliding scale insulin          DVT/GI Prophylaxis: Umu Aquino MD  8/18/2022 6:14 PM

## 2022-08-18 NOTE — ED NOTES
TRANSFER - OUT REPORT:    Verbal report given to Cate (name) on [facility]  being transferred to medical (unit) for routine progression of care       Report consisted of patients Situation, Background, Assessment and   Recommendations(SBAR). Information from the following report(s) SBAR, ED Summary, Procedure Summary, MAR and Recent Results was reviewed with the receiving nurse. Lines:   Peripheral IV 08/18/22 Left Forearm (Active)        Opportunity for questions and clarification was provided.       Patient transported with:   [employer]

## 2022-08-18 NOTE — ED PROVIDER NOTES
EMERGENCY DEPARTMENT HISTORY AND PHYSICAL EXAM    Date: 8/18/2022  Patient Name: Lianet Hicks    History of Presenting Illness     Chief Complaint   Patient presents with    Wound Check         History Provided By: Patient    Chief Complaint: leg swelling redness       Additional History (Context):   2:08 PM  Lianet Hicks is a 49 y.o. female with PMHX COPD, asthma, GERD, hypertension, CVA, diabetes, CAD presents to the emergency department C/O right leg swelling and redness for the past week. Patient has a wound to the right great toe chronically that she is followed by Dr. Edward Jo. States she was just started on Keflex yesterday. Today she noticed a large blister to the toe. No known fevers at home. Denies any chest pain or shortness of breath. Was seen by her PCP today and referred to the hospital to get PVL study to rule out DVT which was found to be negative.       PCP: Jered Simental NP    Current Facility-Administered Medications   Medication Dose Route Frequency Provider Last Rate Last Admin    albuterol (PROVENTIL HFA, VENTOLIN HFA, PROAIR HFA) inhaler 2 Puff (Patient Supplied)  2 Puff Inhalation Q6H PRN Cyndy Wilks MD        albuterol-ipratropium (DUO-NEB) 2.5 MG-0.5 MG/3 ML  3 mL Nebulization Q6H RT Cyndy Wilks MD        [START ON 8/19/2022] DULoxetine (CYMBALTA) capsule 30 mg  30 mg Oral DAILY Cyndy Wilks MD        ALPRAZolam [employer]) tablet 1 mg  1 mg Oral DAILY PRN Cyndy Wilks MD        [START ON 8/19/2022] pantoprazole (PROTONIX) tablet 40 mg  40 mg Oral ACB Cyndy Wilks MD        [START ON 8/19/2022] roflumilast (DALIRESP) tablet 500 mcg  500 mcg Oral DAILY Cyndy Wilks MD        ipratropium (ATROVENT) 0.02 % nebulizer solution 0.5 mg  0.5 mg Nebulization Q6H RT Cyndy Wilks MD        [START ON 8/19/2022] lisinopril-hydroCHLOROthiazide (PRINZIDE, ZESTORETIC) 10-12.5 mg per tablet 1 Tablet  1 Tablet Oral DAILY Jose Wilks MD        pregabalin (LYRICA) capsule 150 mg  150 mg Oral TID Jose Wilks MD        [START ON 8/19/2022] furosemide (LASIX) tablet 20 mg  20 mg Oral DAILY Jose Wilks MD        zolpidem (AMBIEN) tablet 10 mg  10 mg Oral QHS Jose Wilks MD        piperacillin-tazobactam (ZOSYN) 3.375 g in 0.9% sodium chloride (MBP/ADV) 100 mL MBP  3.375 g IntraVENous Q6H Jose Wilks MD        insulin lispro (HUMALOG) injection   SubCUTAneous AC&HS Jose Wilks MD        glucose chewable tablet 16 g  4 Tablet Oral PRN Jose Wilks MD        glucagon (GLUCAGEN) injection 1 mg  1 mg IntraMUSCular PRN Jose Wilks MD        dextrose 10% infusion 0-250 mL  0-250 mL IntraVENous PRN Jose Wilks MD         Current Outpatient Medications   Medication Sig Dispense Refill    lisinopril-hydroCHLOROthiazide (PRINZIDE, ZESTORETIC) 10-12.5 mg per tablet Take 1 Tab by mouth daily. tiotropium (SPIRIVA WITH HANDIHALER) 18 mcg inhalation capsule Take 1 Cap by inhalation daily. albuterol (PROVENTIL HFA, VENTOLIN HFA, PROAIR HFA) 90 mcg/actuation inhaler Take  by inhalation every four (4) hours as needed for Wheezing. zolpidem (AMBIEN) 10 mg tablet Take  by mouth nightly. ALPRAZolam (XANAX) 1 mg tablet Take 1 mg by mouth daily as needed. Indications: anxiety      furosemide (LASIX) 20 mg tablet Take 20 mg by mouth daily. Indications: Edema      pregabalin (LYRICA) 75 mg capsule Take 150 mg by mouth three (3) times daily. esomeprazole (NEXIUM) 40 mg capsule Take  by mouth daily. Instructed to take with sip of water DOS      albuterol-ipratropium (DUO-NEB) 2.5 mg-0.5 mg/3 ml nebulizer solution 3 mL by Nebulization route every four (4) hours. Indications: CHRONIC OBSTRUCTIVE PULMONARY DISEASE WITH BRONCHOSPASMS      roflumilast (DALIRESP) Tab tablet Take 500 mcg by mouth daily.  Indications: COPD ASSOCIATED WITH CHRONIC BRONCHITIS      DULoxetine (CYMBALTA) 60 mg capsule Take 120 mg by mouth daily. Indications: ANXIETY WITH DEPRESSION         Past History     Past Medical History:  Past Medical History:   Diagnosis Date    Aneurysm ([facility] [address]     brain (coiled)    Anxiety     Arthritis     generalized    Asthma     CAD (coronary artery disease) 2017    heart stents    COPD     Depression     depression and anxiety    [facility]     type 2    GERD (gastroesophageal reflux disease)     well controlled with meds    Hypertension     Ill-defined condition     Uses 02 at night 2L/min, and as needed in daytime    Other ill-defined conditions(799.89)     fibromyalgia    Stroke [facility]        Past Surgical History:  Past Surgical History:   Procedure Laterality Date    HX  SECTION      x 3    HX CHOLECYSTECTOMY      HX GYN      hysterectomy    HX HERNIA REPAIR  2013    abdominal     HX OTHER SURGICAL  2004    Brain surgery shunt, coiling    MA CARDIAC SURG PROCEDURE UNLIST  2017    2 stents       Family History:  Family History   Problem Relation Age of Onset    Malignant Hyperthermia Neg Hx     Pseudocholinesterase Deficiency Neg Hx     Delayed Awakening Neg Hx     Post-op Nausea/Vomiting Neg Hx     Post-op Cognitive Dysfunction Neg Hx     Emergence Delirium Neg Hx        Social History:  Social History     Tobacco Use    Smoking status: Every Day     Packs/day: 0.25     Years: 20.00     Pack years: 5.00     Types: Cigarettes    Smokeless tobacco: Never    Tobacco comments:     advised no smoking 24 h pre-op   Substance Use Topics    Alcohol use: No    Drug use: No       Allergies: Allergies   Allergen Reactions    Sulfa (Sulfonamide Antibiotics) Hives, Rash and Itching       Review of Systems   Review of Systems   Constitutional:  Negative for chills and fever. Respiratory:  Negative for shortness of breath. Cardiovascular:  Negative for chest pain.    Gastrointestinal:  Negative for abdominal pain, diarrhea, nausea and vomiting. Musculoskeletal:  Negative for back pain. Skin:  Positive for color change and wound. Neurological:  Negative for weakness and numbness. All other systems reviewed and are negative. Physical Exam     Vitals:    08/18/22 1358 08/18/22 1523 08/18/22 1559 08/18/22 1657   BP: (!) 146/72 137/76 (!) 144/74 (!) 148/72   Pulse: (!) 113 (!) 113 (!) 112 (!) 108   Resp: 18 18 28 18   Temp: 98.3 °F (36.8 °C)      SpO2: 98% 100% 97% 100%   Weight: 109.8 kg (242 lb)      Height: 5' 8\" (1.727 m)        Physical Exam  Vitals and nursing note reviewed. Constitutional:       Appearance: She is well-developed. Comments: Alert well-appearing nontoxic no acute distress   HENT:      Head: Normocephalic and atraumatic. Cardiovascular:      Rate and Rhythm: Regular rhythm. Tachycardia present. Heart sounds: Normal heart sounds. No murmur heard. Pulmonary:      Effort: Pulmonary effort is normal. No respiratory distress. Breath sounds: Normal breath sounds. No wheezing or rales. Abdominal:      General: Bowel sounds are normal.      Palpations: Abdomen is soft. Tenderness: There is no abdominal tenderness. Musculoskeletal:      Cervical back: Normal range of motion and neck supple. Comments: Large blister to the right great toe small 1 cm wound to the pedal aspect of the right great toe, increased warmth swelling and erythema to the right lower leg, no obvious red streaking up the thigh, pulses 2+ for DP and PT on the right   Skin:     Findings: Erythema present. Comments: See musc     Neurological:      Mental Status: She is alert and oriented to person, place, and time.    Psychiatric:         Judgment: Judgment normal.         Diagnostic Study Results     Labs:     Recent Results (from the past 12 hour(s))   CBC WITH AUTOMATED DIFF    Collection Time: 08/18/22  2:18 PM   Result Value Ref Range    WBC 10.6 4.6 - 13.2 K/uL    RBC 3.45 (L) 4.20 - 5.30 M/uL HGB 9.3 (L) 12.0 - 16.0 g/dL    HCT 29.9 (L) 35.0 - 45.0 %    MCV 86.7 78.0 - 100.0 FL    MCH 27.0 24.0 - 34.0 PG    MCHC 31.1 31.0 - 37.0 g/dL    RDW 13.4 11.6 - 14.5 %    PLATELET 683 932 - 517 K/uL    MPV 10.6 9.2 - 11.8 FL    NRBC 0.0 0  WBC    ABSOLUTE NRBC 0.00 0.00 - 0.01 K/uL    NEUTROPHILS 68 40 - 73 %    LYMPHOCYTES 22 21 - 52 %    MONOCYTES 6 3 - 10 %    EOSINOPHILS 3 0 - 5 %    BASOPHILS 1 0 - 2 %    IMMATURE GRANULOCYTES 0 0.0 - 0.5 %    ABS. NEUTROPHILS 7.2 1.8 - 8.0 K/UL    ABS. LYMPHOCYTES 2.3 0.9 - 3.6 K/UL    ABS. MONOCYTES 0.6 0.05 - 1.2 K/UL    ABS. EOSINOPHILS 0.4 0.0 - 0.4 K/UL    ABS. BASOPHILS 0.1 0.0 - 0.1 K/UL    ABS. IMM. GRANS. 0.0 0.00 - 0.04 K/UL    DF AUTOMATED     METABOLIC PANEL, COMPREHENSIVE    Collection Time: 08/18/22  2:18 PM   Result Value Ref Range    Sodium 135 (L) 136 - 145 mmol/L    Potassium 4.8 3.5 - 5.5 mmol/L    Chloride 103 100 - 111 mmol/L    CO2 26 21 - 32 mmol/L    Anion gap 6 3.0 - 18 mmol/L    Glucose 226 (H) 74 - 99 mg/dL    BUN 44 (H) 7.0 - 18 MG/DL    Creatinine 1.27 0.6 - 1.3 MG/DL    BUN/Creatinine ratio 35 (H) 12 - 20      GFR est AA 53 (L) >60 ml/min/1.73m2    GFR est non-AA 44 (L) >60 ml/min/1.73m2    Calcium 9.1 8.5 - 10.1 MG/DL    Bilirubin, total 0.4 0.2 - 1.0 MG/DL    ALT (SGPT) 11 (L) 13 - 56 U/L    AST (SGOT) 22 10 - 38 U/L    Alk.  phosphatase 92 45 - 117 U/L    Protein, total 8.8 (H) 6.4 - 8.2 g/dL    Albumin 3.5 3.4 - 5.0 g/dL    Globulin 5.3 (H) 2.0 - 4.0 g/dL    A-G Ratio 0.7 (L) 0.8 - 1.7     LACTIC ACID    Collection Time: 08/18/22  2:18 PM   Result Value Ref Range    Lactic acid 1.5 0.4 - 2.0 MMOL/L   EKG, 12 LEAD, INITIAL    Collection Time: 08/18/22  2:24 PM   Result Value Ref Range    Ventricular Rate 114 BPM    Atrial Rate 114 BPM    P-R Interval 118 ms    QRS Duration 104 ms    Q-T Interval 332 ms    QTC Calculation (Bezet) 457 ms    Calculated P Axis 59 degrees    Calculated R Axis 9 degrees    Calculated T Axis 45 degrees Diagnosis       Sinus tachycardia  Otherwise normal ECG  When compared with ECG of 28-OCT-2014 13:04,  Nonspecific T wave abnormality no longer evident in Lateral leads  Confirmed by Nato Tyson MD, -- ([id number]) on 8/18/2022 6:03:40 PM         Radiologic Studies:   XR CHEST PORT   Final Result      No acute pulmonary process identified. XR GREAT TOE RT MIN 2 V   Final Result      Significant soft tissue swelling involving the right foot great toe without   evidence of fracture or retained radiopaque foreign object. MRI FOOT RT W WO CONT    (Results Pending)     CT Results  (Last 48 hours)      None          CXR Results  (Last 48 hours)                 08/18/22 1501  XR CHEST PORT Final result    Impression:      No acute pulmonary process identified. Narrative:  EXAM: One-view chest       CLINICAL HISTORY: tachy ,       COMPARISON: None       FINDINGS:       Frontal view of the chest demonstrate clear lungs. Cardiac silhouette is normal   in size and contour. No acute bony or soft tissue abnormality. Medical Decision Making   I am the first provider for this patient. I reviewed the vital signs, available nursing notes, past medical history, past surgical history, family history and social history. Vital Signs: Reviewed the patient's vital signs. Pulse Oximetry Analysis: 98% on RA     Cardiac Monitor:  Rate: 114 bpm  Rhythm: sinus tach    EKG interpretation: (Preliminary)  2:08 PM   Sinus tachycardia rate 114 bpm no STEMI  EKG read by Verona Foster PA-C   at 1424     Records Reviewed: Nursing Notes, Old Medical Records, and Previous electrocardiograms    Procedures:  Procedures    ED Course:   2:08 PM Initial assessment performed. The patients presenting problems have been discussed, and they are in agreement with the care plan formulated and outlined with them. I have encouraged them to ask questions as they arise throughout their visit.     6:15 PM  Case discussed with Marie Wu MD, agrees to admit requesting podiatry consult    6:28 PM  Case discussed with podiatry Dr. John Lemus, requesting MRI with and without and infectious disease consult. We will follow-up with patient in hospital    6:56 PM  Case discussed with Dr. Marcial Tavares, infectious disease will follow up on patient in hospital recommends starting on vancomycin and Rocephin and holding on Zosyn at this time      Core Measures:  For Hospitalized Patients:    1. Hospitalization Decision Time:  The decision to hospitalize the patient was made by Kacey Pat PA-C   at 6:58 PM on 8/18/2022    2. Aspirin: Aspirin was not given because the patient did not present with a stroke at the time of their Emergency Department evaluation    6:58 PM  Patient is being admitted to the hospital by Callie Wilks. The results of their tests and reasons for their admission have been discussed with them and/or available family. They convey agreement and understanding for the need to be admitted and for their admission diagnosis. CONDITIONS ON ADMISSION:  Sepsis is not present at the time of admission. Deep Vein Thrombosis is not present at the time of admission. Thrombosis is not present at the time of admission. Urinary Tract Infection is not present at the time of admission. Pneumonia is not present at the time of admission. Pressure Ulcer is not present at the time of admission. CLINICAL IMPRESSION:    1. Cellulitis of right lower extremity          Discussion:  Pt presents with right lower extremity redness and swelling. Patient with a history of diabetes and chronic toe wound. Today with blister to the toe. Recently started on Keflex yesterday. No fevers on arrival patient was tachycardic. No leukocytosis with normal lactic acid. X-ray shows no obvious findings of osteo-. Case discussed with hospitalist podiatry and infectious disease will admit get MRI routine and start IV antibiotics.   Wound and blood cultures pending. Diagnosis and Disposition         CLINICAL IMPRESSION:    1. Cellulitis of right lower extremity        PLAN:  1. admit       Please note that this dictation was completed with [employer], the computer voice recognition software. Quite often unanticipated grammatical, syntax, homophones, and other interpretive errors are inadvertently transcribed by the computer software. Please disregard these errors. Please excuse any errors that have escaped final proofreading.

## 2022-08-19 ENCOUNTER — APPOINTMENT (OUTPATIENT)
Dept: NON INVASIVE DIAGNOSTICS | Age: 57
End: 2022-08-19
Attending: INTERNAL MEDICINE
Payer: MEDICARE

## 2022-08-19 ENCOUNTER — APPOINTMENT (OUTPATIENT)
Dept: VASCULAR SURGERY | Age: 57
End: 2022-08-19
Attending: INTERNAL MEDICINE
Payer: MEDICARE

## 2022-08-19 LAB
ANION GAP SERPL CALC-SCNC: 4 MMOL/L (ref 3–18)
BUN SERPL-MCNC: 47 MG/DL (ref 7–18)
BUN/CREAT SERPL: 32 (ref 12–20)
CALCIUM SERPL-MCNC: 8.8 MG/DL (ref 8.5–10.1)
CHLORIDE SERPL-SCNC: 109 MMOL/L (ref 100–111)
CO2 SERPL-SCNC: 24 MMOL/L (ref 21–32)
CREAT SERPL-MCNC: 1.47 MG/DL (ref 0.6–1.3)
GLUCOSE BLD STRIP.AUTO-MCNC: 244 MG/DL (ref 70–110)
GLUCOSE BLD STRIP.AUTO-MCNC: 276 MG/DL (ref 70–110)
GLUCOSE BLD STRIP.AUTO-MCNC: 308 MG/DL (ref 70–110)
GLUCOSE BLD STRIP.AUTO-MCNC: 309 MG/DL (ref 70–110)
GLUCOSE SERPL-MCNC: 149 MG/DL (ref 74–99)
LEFT CFA DIST SYS PSV: 160.8 CM/S
LEFT DIST ATA VELOCITY: 46.1 CM/S
LEFT DIST PTA PSV: 117.2 CM/S
LEFT POPLITEAL DIST SYS PSV: 130.2 CM/S
LEFT POPLITEAL PROX SYS PSV: 201.3 CM/S
LEFT PROX PFA A PSV: 92.7 CM/S
LEFT SFA MID VEL RATIO: 0.67
LEFT SFA PROX VEL RATIO: 1.04
LEFT SUPER FEMORAL MID SYS PSV: 112.5 CM/S
LEFT SUPER FEMORAL PROX SYS PSV: 167.3 CM/S
POTASSIUM SERPL-SCNC: 4.8 MMOL/L (ref 3.5–5.5)
RIGHT CFA DIST SYS PSV: 191.6 CM/S
RIGHT DIST ATA VELOCITY: 60.7 CM/S
RIGHT DIST PTA PSV: 90.4 CM/S
RIGHT POPLITEAL DIST SYS PSV: 100.8 CM/S
RIGHT POPLITEAL PROX SYS PSV: 139.7 CM/S
RIGHT PROX PFA A PSV: 108.9 CM/S
RIGHT SFA MID VEL RATIO: 1.1
RIGHT SFA PROX VEL RATIO: 0.9
RIGHT SUPER FEMORAL MID SYS PSV: 176.9 CM/S
RIGHT SUPER FEMORAL PROX SYS PSV: 163.1 CM/S
SODIUM SERPL-SCNC: 137 MMOL/L (ref 136–145)
VANCOMYCIN SERPL-MCNC: 15.9 UG/ML (ref 5–40)

## 2022-08-19 PROCEDURE — 87075 CULTR BACTERIA EXCEPT BLOOD: CPT

## 2022-08-19 PROCEDURE — 94640 AIRWAY INHALATION TREATMENT: CPT

## 2022-08-19 PROCEDURE — 36415 COLL VENOUS BLD VENIPUNCTURE: CPT

## 2022-08-19 PROCEDURE — 74011000258 HC RX REV CODE- 258: Performed by: INTERNAL MEDICINE

## 2022-08-19 PROCEDURE — 96372 THER/PROPH/DIAG INJ SC/IM: CPT

## 2022-08-19 PROCEDURE — 74011250636 HC RX REV CODE- 250/636: Performed by: INTERNAL MEDICINE

## 2022-08-19 PROCEDURE — 87040 BLOOD CULTURE FOR BACTERIA: CPT

## 2022-08-19 PROCEDURE — 94760 N-INVAS EAR/PLS OXIMETRY 1: CPT

## 2022-08-19 PROCEDURE — C8929 TTE W OR WO FOL WCON,DOPPLER: HCPCS

## 2022-08-19 PROCEDURE — 87205 SMEAR GRAM STAIN: CPT

## 2022-08-19 PROCEDURE — 65270000046 HC RM TELEMETRY

## 2022-08-19 PROCEDURE — G0378 HOSPITAL OBSERVATION PER HR: HCPCS

## 2022-08-19 PROCEDURE — 80202 ASSAY OF VANCOMYCIN: CPT

## 2022-08-19 PROCEDURE — 74011636637 HC RX REV CODE- 636/637: Performed by: HOSPITALIST

## 2022-08-19 PROCEDURE — C1751 CATH, INF, PER/CENT/MIDLINE: HCPCS

## 2022-08-19 PROCEDURE — 87147 CULTURE TYPE IMMUNOLOGIC: CPT

## 2022-08-19 PROCEDURE — 74011000250 HC RX REV CODE- 250: Performed by: INTERNAL MEDICINE

## 2022-08-19 PROCEDURE — 82962 GLUCOSE BLOOD TEST: CPT

## 2022-08-19 PROCEDURE — 74011250636 HC RX REV CODE- 250/636: Performed by: HOSPITALIST

## 2022-08-19 PROCEDURE — 74011250637 HC RX REV CODE- 250/637: Performed by: INTERNAL MEDICINE

## 2022-08-19 PROCEDURE — 93925 LOWER EXTREMITY STUDY: CPT

## 2022-08-19 PROCEDURE — 96375 TX/PRO/DX INJ NEW DRUG ADDON: CPT

## 2022-08-19 PROCEDURE — 77010033678 HC OXYGEN DAILY

## 2022-08-19 PROCEDURE — 74011250636 HC RX REV CODE- 250/636: Performed by: PHYSICIAN ASSISTANT

## 2022-08-19 PROCEDURE — 80048 BASIC METABOLIC PNL TOTAL CA: CPT

## 2022-08-19 PROCEDURE — 96376 TX/PRO/DX INJ SAME DRUG ADON: CPT

## 2022-08-19 PROCEDURE — 74011636637 HC RX REV CODE- 636/637: Performed by: INTERNAL MEDICINE

## 2022-08-19 RX ORDER — ENOXAPARIN SODIUM 100 MG/ML
40 INJECTION SUBCUTANEOUS EVERY 24 HOURS
Status: DISCONTINUED | OUTPATIENT
Start: 2022-08-19 | End: 2022-08-20

## 2022-08-19 RX ORDER — IPRATROPIUM BROMIDE AND ALBUTEROL SULFATE 2.5; .5 MG/3ML; MG/3ML
3 SOLUTION RESPIRATORY (INHALATION)
Status: DISCONTINUED | OUTPATIENT
Start: 2022-08-19 | End: 2022-08-22 | Stop reason: HOSPADM

## 2022-08-19 RX ADMIN — VANCOMYCIN HYDROCHLORIDE 500 MG: 500 INJECTION, POWDER, LYOPHILIZED, FOR SOLUTION INTRAVENOUS at 03:42

## 2022-08-19 RX ADMIN — PANTOPRAZOLE SODIUM 40 MG: 40 TABLET, DELAYED RELEASE ORAL at 06:37

## 2022-08-19 RX ADMIN — Medication 6 UNITS: at 17:17

## 2022-08-19 RX ADMIN — PREGABALIN 150 MG: 75 CAPSULE ORAL at 22:54

## 2022-08-19 RX ADMIN — ZOLPIDEM TARTRATE 10 MG: 5 TABLET ORAL at 22:54

## 2022-08-19 RX ADMIN — DULOXETINE HYDROCHLORIDE 30 MG: 30 CAPSULE, DELAYED RELEASE ORAL at 08:20

## 2022-08-19 RX ADMIN — Medication 8 UNITS: at 11:55

## 2022-08-19 RX ADMIN — CEFTRIAXONE 1 G: 1 INJECTION, POWDER, FOR SOLUTION INTRAMUSCULAR; INTRAVENOUS at 11:09

## 2022-08-19 RX ADMIN — IPRATROPIUM BROMIDE AND ALBUTEROL SULFATE 3 ML: .5; 3 SOLUTION RESPIRATORY (INHALATION) at 01:04

## 2022-08-19 RX ADMIN — METHYLPREDNISOLONE SODIUM SUCCINATE 20 MG: 40 INJECTION, POWDER, FOR SOLUTION INTRAMUSCULAR; INTRAVENOUS at 22:55

## 2022-08-19 RX ADMIN — METHYLPREDNISOLONE SODIUM SUCCINATE 20 MG: 40 INJECTION, POWDER, FOR SOLUTION INTRAMUSCULAR; INTRAVENOUS at 14:39

## 2022-08-19 RX ADMIN — VANCOMYCIN HYDROCHLORIDE 1000 MG: 1 INJECTION, POWDER, LYOPHILIZED, FOR SOLUTION INTRAVENOUS at 16:22

## 2022-08-19 RX ADMIN — ARFORMOTEROL TARTRATE 15 MCG: 15 SOLUTION RESPIRATORY (INHALATION) at 08:19

## 2022-08-19 RX ADMIN — INSULIN GLARGINE 33 UNITS: 100 INJECTION, SOLUTION SUBCUTANEOUS at 22:56

## 2022-08-19 RX ADMIN — LISINOPRIL 10 MG: 5 TABLET ORAL at 08:20

## 2022-08-19 RX ADMIN — FUROSEMIDE 20 MG: 20 TABLET ORAL at 08:20

## 2022-08-19 RX ADMIN — PERFLUTREN 1 ML: 6.52 INJECTION, SUSPENSION INTRAVENOUS at 10:00

## 2022-08-19 RX ADMIN — BUDESONIDE 500 MCG: 0.5 INHALANT RESPIRATORY (INHALATION) at 08:19

## 2022-08-19 RX ADMIN — PREGABALIN 150 MG: 75 CAPSULE ORAL at 09:00

## 2022-08-19 RX ADMIN — PREGABALIN 150 MG: 75 CAPSULE ORAL at 16:22

## 2022-08-19 RX ADMIN — Medication 8 UNITS: at 07:30

## 2022-08-19 RX ADMIN — IPRATROPIUM BROMIDE AND ALBUTEROL SULFATE 3 ML: .5; 3 SOLUTION RESPIRATORY (INHALATION) at 08:20

## 2022-08-19 RX ADMIN — ARFORMOTEROL TARTRATE 15 MCG: 15 SOLUTION RESPIRATORY (INHALATION) at 19:54

## 2022-08-19 RX ADMIN — MORPHINE SULFATE 2 MG: 2 INJECTION, SOLUTION INTRAMUSCULAR; INTRAVENOUS at 13:27

## 2022-08-19 RX ADMIN — METHYLPREDNISOLONE SODIUM SUCCINATE 60 MG: 40 INJECTION, POWDER, FOR SOLUTION INTRAMUSCULAR; INTRAVENOUS at 05:21

## 2022-08-19 RX ADMIN — ROFLUMILAST 500 MCG: 500 TABLET ORAL at 08:20

## 2022-08-19 RX ADMIN — BUDESONIDE 500 MCG: 0.5 INHALANT RESPIRATORY (INHALATION) at 19:54

## 2022-08-19 RX ADMIN — Medication 9 UNITS: at 23:30

## 2022-08-19 RX ADMIN — AZITHROMYCIN MONOHYDRATE 500 MG: 500 INJECTION, POWDER, LYOPHILIZED, FOR SOLUTION INTRAVENOUS at 22:55

## 2022-08-19 NOTE — PROGRESS NOTES
Spoke with nurse in regards to shunt and coils that patient has in her brain. Patient has no information and has never had an MRI. According to [facility] [employer] system has lost her information in regards to these surgeries so we do not know what types they are to research to see if they are safe. I am cancelling the MRI due to this.

## 2022-08-19 NOTE — PROGRESS NOTES
[facility] Pharmacokinetic Monitoring Service - Vancomycin    Consulting Provider: JUS Smart   Indication: Skin and Soft Tissue Infection  Target Concentration: Goal trough of 10-15 mg/L and AUC/ZAYDA <500 mg*hr/L  Day of Therapy: 2  Additional Antimicrobials:  Azithromycin    Pertinent Laboratory Values: Wt Readings from Last 1 Encounters:   08/19/22 109.8 kg (242 lb)     Temp Readings from Last 1 Encounters:   08/19/22 97.7 °F (36.5 °C)     No components found for: PROCAL  Estimated Creatinine Clearance: 56.8 mL/min (A) (based on SCr of 1.47 mg/dL (H)). Recent Labs     08/18/22  1418   WBC 10.6       Pertinent Cultures:  Culture Date Source Results   8/18/22 wound Gram - coccobacilli (preliminary)    8/19/22 wound collected       Assessment:  Date/Time Current Dose Concentration Timing of Concentration (h) AUC (ss)   8/19/22 at 15:25 Vancomycin 500 mg IV q8h Random level = 15.9  8/19/22 at 12:32 > 600 mg/L.hr   Note: Serum concentrations collected for AUC dosing may appear elevated if collected in close proximity to the dose administered, this is not necessarily an indication of toxicity    Plan:  Scr = 1.47  (increased from Scr = 1.27 on 8/18/22)   Prior Vancomycin dose predicted to be supra-therapeutic.    Dose adjusted to:  Vancomycin 1000 mg IV q24h, next dose scheduled for 8/19/22 at 16:00        Est AUC (ss): 440 mg/L      Est trough (ss): 14.5 mg/L  Pharmacy will continue to monitor patient and adjust therapy as indicated    Thank you for the consult,  ELISEO Coronado  8/19/2022 3:16 PM

## 2022-08-19 NOTE — CONSULTS
Podiatry Consult    Subjective:         Date of Consultation: 2022     Referring Physician:Dr. Wilks    Patient is a 49 y.o.  female who is being seen for treatment of large blister on top of her right big toe and left big toe ulcer. She states that the blister started a few days ago, then she noticed redness up her right leg. She had been going to Dr. Jasbir Trujillo her podiatrist weekly for treatment of her ulcer on the bottom of her left big toe and saw her last week.     Patient Active Problem List    Diagnosis Date Noted    Cellulitis 2022    Type II or unspecified type diabetes mellitus without mention of complication, not stated as uncontrolled 2013    Incarcerated hernia 2013    COPD exacerbation ([address] 2012    Other and unspecified noninfectious gastroenteritis and colitis(558.9) 08/15/2012    Esophageal reflux 08/15/2012    Diabetes mellitus, insulin dependent (IDDM), uncontrolled 08/15/2012    HTN (hypertension) 08/15/2012    Dehydration 08/15/2012     Past Medical History:   Diagnosis Date    Aneurysm ([address] 2004    brain (coiled)    Anxiety     Arthritis     generalized    Asthma     CAD (coronary artery disease) 2017    heart stents    COPD     Depression     depression and anxiety    Diabetes ([address] 2007    type 2    GERD (gastroesophageal reflux disease)     well controlled with meds    Hypertension     Ill-defined condition     Uses 02 at night 2L/min, and as needed in daytime    Other ill-defined conditions(799.89)     fibromyalgia    Stroke ([address] 2004      Past Surgical History:   Procedure Laterality Date    HX  SECTION      x 3    HX CHOLECYSTECTOMY      HX GYN      hysterectomy    HX HERNIA REPAIR  2013    abdominal     HX OTHER SURGICAL  2004    Brain surgery shunt, coiling    [place] CARDIAC SURG PROCEDURE UNLIST  2017    2 stents      Family History   Problem Relation Age of Onset    Malignant Hyperthermia Neg Hx Pseudocholinesterase Deficiency Neg Hx     Delayed Awakening Neg Hx     Post-op Nausea/Vomiting Neg Hx     Post-op Cognitive Dysfunction Neg Hx     Emergence Delirium Neg Hx       Social History     Tobacco Use    Smoking status: Every Day     Packs/day: 0.25     Years: 20.00     Pack years: 5.00     Types: Cigarettes    Smokeless tobacco: Never    Tobacco comments:     advised no smoking 24 h pre-op   Substance Use Topics    Alcohol use: No     Current Facility-Administered Medications   Medication Dose Route Frequency Provider Last Rate Last Admin    methylPREDNISolone (PF) (SOLU-MEDROL) injection 20 mg  20 mg IntraVENous Q8H Laly Lenz MD        cefTRIAXone (ROCEPHIN) 1 g in 0.9% sodium chloride (MBP/ADV) 50 mL MBP  1 g IntraVENous Q24H Kasie Moreira  mL/hr at 08/19/22 1109 1 g at 08/19/22 1109    Vancomycin Random level due 8/19/22 at 12:30  1 Each Other ONCE Maude Min PA        albuterol (PROVENTIL HFA, VENTOLIN HFA, PROAIR HFA) inhaler 2 Puff (Patient Supplied)  2 Puff Inhalation Q6H PRN Dalila Wilks MD        albuterol-ipratropium (DUO-NEB) 2.5 MG-0.5 MG/3 ML  3 mL Nebulization Q6H RT Dalila Wilks MD   3 mL at 08/19/22 0820    DULoxetine (CYMBALTA) capsule 30 mg  30 mg Oral DAILY Dalila Wilks MD   30 mg at 08/19/22 0820    ALPRAZolam (XANAX) tablet 1 mg  1 mg Oral DAILY PRN Dalila Wilks MD        pantoprazole (PROTONIX) tablet 40 mg  40 mg Oral ACB Dalila Wilks MD   40 mg at 08/19/22 0590    roflumilast (DALIRESP) tablet 500 mcg  500 mcg Oral DAILY Dalila Wilks MD   500 mcg at 08/19/22 0820    pregabalin (LYRICA) capsule 150 mg  150 mg Oral TID Dalila Wilks MD   150 mg at 08/19/22 0900    furosemide (LASIX) tablet 20 mg  20 mg Oral DAILY Dalila Wilks MD   20 mg at 08/19/22 0820    zolpidem (AMBIEN) tablet 10 mg  10 mg Oral QHS Dalila Wilks MD   10 mg at 08/18/22 8822    insulin lispro (HUMALOG) injection   SubCUTAneous AC&HS Sadie Lema MD   8 Units at 08/19/22 1155    glucose chewable tablet 16 g  4 Tablet Oral PRN Polly Wilks Sa, MD        glucagon (GLUCAGEN) injection 1 mg  1 mg IntraMUSCular PRN Polly Wilks Sa, MD        dextrose 10% infusion 0-250 mL  0-250 mL IntraVENous PRN Polly Wilks Sa, MD        morphine injection 2 mg  2 mg IntraVENous Q4H PRN Polly Wilks Sa, MD   2 mg at 08/18/22 1948    arformoteroL (BROVANA) neb solution 15 mcg  15 mcg Nebulization BID RT Polly Wilks Sa, MD   15 mcg at 08/19/22 0819    budesonide (PULMICORT) 500 mcg/2 ml nebulizer suspension  500 mcg Nebulization BID RT Polly Wilks Sa, MD   500 mcg at 08/19/22 6523    oxyCODONE-acetaminophen (PERCOCET) 5-325 mg per tablet 1 Tablet  1 Tablet Oral Q6H PRN Polly Wilks Sa, MD        lisinopriL (PRINIVIL, ZESTRIL) tablet 10 mg  10 mg Oral DAILY Polly Wilks Sa, MD   10 mg at 08/19/22 0820    insulin glargine (LANTUS) injection 33 Units  0.3 Units/kg SubCUTAneous QHS Polly Wilks Sa, MD   33 Units at 08/18/22 2209    glucose chewable tablet 16 g  4 Tablet Oral PRN Polly Wilks Sa, MD        glucagon (GLUCAGEN) injection 1 mg  1 mg IntraMUSCular PRN Polly Wilks Sa, MD        dextrose 10% infusion 0-250 mL  0-250 mL IntraVENous PRN Polly Wilks Sa, MD        azithromycin (ZITHROMAX) 500 mg in 0.9% sodium chloride 250 mL (VIAL-MATE)  500 mg IntraVENous Q24H Polly Wilks Sa,  mL/hr at 08/18/22 2123 500 mg at 08/18/22 2123    Vancomycin-Pharmacy-To-Dose  1 Each Other Rx Dosing/Monitoring Newhall, Waymon Essex, PA          Allergies   Allergen Reactions    Sulfa (Sulfonamide Antibiotics) Hives, Rash and Itching        Review of Systems:  A comprehensive review of systems was negative except for that written in the History of Present Illness.     Objective:     Patient Vitals for the past 8 hrs:   BP Temp Pulse Resp SpO2 Height Weight   08/19/22 1127 133/70 97.7 °F (36.5 °C) 98 20 95 % -- --   22 1021 (!) 132/90 -- -- -- -- 5' 8\" (1.727 m) 109.8 kg (242 lb)   22 0821 -- -- -- -- 99 % -- --   22 0757 (!) 132/90 98 °F (36.7 °C) 86 20 97 % -- --     Temp (24hrs), Av.1 °F (36.7 °C), Min:97.7 °F (36.5 °C), Max:98.6 °F (37 °C)      Physical Exam:    Vascular:  Dorsalis pedis pulses are 2/4 bilateral.  Posterior tibial pulses are 2/4 bilateral.  Capillary fill time is less than 3 seconds, bilateral.  Edema is observed bilateral lower extremities. Varicosities are not observed bilateral lower extremities. Derm:  Skin temperature of lower extremities warm to cool proximal to distal bilateral.  Inspection of skin shows positive digital hair with healthy skin bilateral.  Positive erythema seen anterior right lower leg with large fluid filled blister on top of her right big toe. Open ulcer plantar left hallux, 1 x 1 x 02 cm deep to subcutaneous level, with brown fibrotic base. Negative erythema or drainage noted for the left hallux. Ortho:  Muscle strength 5/5 for all groups tested. Muscle tone normal. Inspection/palpation of bones - joints- muscle shows - within normal limits on the bilateral lower extremities. Neuro:  Light touch, sharp/dull, vibratory, and proprioception sensations all decreased bilateral lower extremities. Deep tendon reflexes decreased bilaterally.      Lab Review:   Recent Results (from the past 24 hour(s))   CBC WITH AUTOMATED DIFF    Collection Time: 22  2:18 PM   Result Value Ref Range    WBC 10.6 4.6 - 13.2 K/uL    RBC 3.45 (L) 4.20 - 5.30 M/uL    HGB 9.3 (L) 12.0 - 16.0 g/dL    HCT 29.9 (L) 35.0 - 45.0 %    MCV 86.7 78.0 - 100.0 FL    MCH 27.0 24.0 - 34.0 PG    MCHC 31.1 31.0 - 37.0 g/dL    RDW 13.4 11.6 - 14.5 %    PLATELET 074 800 - 070 K/uL    MPV 10.6 9.2 - 11.8 FL    NRBC 0.0 0  WBC    ABSOLUTE NRBC 0.00 0.00 - 0.01 K/uL    NEUTROPHILS 68 40 - 73 %    LYMPHOCYTES 22 21 - 52 %    MONOCYTES 6 3 - 10 %    EOSINOPHILS 3 0 - 5 %    BASOPHILS 1 0 - 2 %    IMMATURE GRANULOCYTES 0 0.0 - 0.5 %    ABS. NEUTROPHILS 7.2 1.8 - 8.0 K/UL    ABS. LYMPHOCYTES 2.3 0.9 - 3.6 K/UL    ABS. MONOCYTES 0.6 0.05 - 1.2 K/UL    ABS. EOSINOPHILS 0.4 0.0 - 0.4 K/UL    ABS. BASOPHILS 0.1 0.0 - 0.1 K/UL    ABS. IMM. GRANS. 0.0 0.00 - 0.04 K/UL    DF AUTOMATED     METABOLIC PANEL, COMPREHENSIVE    Collection Time: 08/18/22  2:18 PM   Result Value Ref Range    Sodium 135 (L) 136 - 145 mmol/L    Potassium 4.8 3.5 - 5.5 mmol/L    Chloride 103 100 - 111 mmol/L    CO2 26 21 - 32 mmol/L    Anion gap 6 3.0 - 18 mmol/L    Glucose 226 (H) 74 - 99 mg/dL    BUN 44 (H) 7.0 - 18 MG/DL    Creatinine 1.27 0.6 - 1.3 MG/DL    BUN/Creatinine ratio 35 (H) 12 - 20      GFR est AA 53 (L) >60 ml/min/1.73m2    GFR est non-AA 44 (L) >60 ml/min/1.73m2    Calcium 9.1 8.5 - 10.1 MG/DL    Bilirubin, total 0.4 0.2 - 1.0 MG/DL    ALT (SGPT) 11 (L) 13 - 56 U/L    AST (SGOT) 22 10 - 38 U/L    Alk.  phosphatase 92 45 - 117 U/L    Protein, total 8.8 (H) 6.4 - 8.2 g/dL    Albumin 3.5 3.4 - 5.0 g/dL    Globulin 5.3 (H) 2.0 - 4.0 g/dL    A-G Ratio 0.7 (L) 0.8 - 1.7     CULTURE, BLOOD    Collection Time: 08/18/22  2:18 PM    Specimen: Blood   Result Value Ref Range    Special Requests: NO SPECIAL REQUESTS      Culture result: NO GROWTH AFTER 16 HOURS     LACTIC ACID    Collection Time: 08/18/22  2:18 PM   Result Value Ref Range    Lactic acid 1.5 0.4 - 2.0 MMOL/L   HEMOGLOBIN A1C WITH EAG    Collection Time: 08/18/22  2:18 PM   Result Value Ref Range    Hemoglobin A1c 8.8 (H) 4.2 - 5.6 %    Est. average glucose 206 mg/dL   CULTURE, WOUND W GRAM STAIN    Collection Time: 08/18/22  2:20 PM    Specimen: Wound Drainage   Result Value Ref Range    Special Requests: NO SPECIAL REQUESTS      GRAM STAIN RARE WBCS SEEN      GRAM STAIN OCCASIONAL GRAM NEGATIVE COCCOBACILLI      Culture result: PENDING    EKG, 12 LEAD, INITIAL    Collection Time: 08/18/22  2:24 PM   Result Value Ref Range    Ventricular Rate 114 BPM    Atrial Rate 114 BPM    P-R Interval 118 ms    QRS Duration 104 ms    Q-T Interval 332 ms    QTC Calculation (Bezet) 457 ms    Calculated P Axis 59 degrees    Calculated R Axis 9 degrees    Calculated T Axis 45 degrees    Diagnosis       Sinus tachycardia  Otherwise normal ECG  When compared with ECG of 28-OCT-2014 13:04,  Nonspecific T wave abnormality no longer evident in Lateral leads  Confirmed by Declan Jones MD, -- ([id number]) on 8/18/2022 6:03:40 PM     CULTURE, BLOOD    Collection Time: 08/18/22  2:35 PM    Specimen: Blood   Result Value Ref Range    Special Requests: NO SPECIAL REQUESTS      Culture result: NO GROWTH AFTER 16 HOURS     GLUCOSE, POC    Collection Time: 08/18/22  9:56 PM   Result Value Ref Range    Glucose (POC) 304 (H) 70 - 777 mg/dL   METABOLIC PANEL, BASIC    Collection Time: 08/19/22  1:32 AM   Result Value Ref Range    Sodium 137 136 - 145 mmol/L    Potassium 4.8 3.5 - 5.5 mmol/L    Chloride 109 100 - 111 mmol/L    CO2 24 21 - 32 mmol/L    Anion gap 4 3.0 - 18 mmol/L    Glucose 149 (H) 74 - 99 mg/dL    BUN 47 (H) 7.0 - 18 MG/DL    Creatinine 1.47 (H) 0.6 - 1.3 MG/DL    BUN/Creatinine ratio 32 (H) 12 - 20      GFR est AA 45 (L) >60 ml/min/1.73m2    GFR est non-AA 37 (L) >60 ml/min/1.73m2    Calcium 8.8 8.5 - 10.1 MG/DL   CULTURE, BLOOD    Collection Time: 08/19/22  1:32 AM    Specimen: Blood   Result Value Ref Range    Special Requests: NO SPECIAL REQUESTS      Culture result: NO GROWTH AFTER 5 HOURS     GLUCOSE, POC    Collection Time: 08/19/22  7:53 AM   Result Value Ref Range    Glucose (POC) 308 (H) 70 - 110 mg/dL   DUPLEX LOWER EXT ARTERY BILAT    Collection Time: 08/19/22 10:08 AM   Result Value Ref Range    Right SFA Prox Jaguar Ratio 0.9     Right SFA Mid Jaguar Ratio 1.1     Left SFA Prox Jaguar Ratio 1.04     Left SFA Mid Jaguar Ratio 0.67     Right CFA dist sys .6 cm/s    Right super femoral prox sys .1 cm/s    Right super femoral mid sys PSV 176.9 cm/s    Right Prox PFA A .9 cm/s    Right popliteal prox sys .7 cm/s    Right popliteal dist sys .8 cm/s    Right Dist PTA PSV 90.4 cm/s    Right Dist DENIS Velocity 60.7 cm/s    Left CFA dist sys .8 cm/s    Left super femoral prox sys .3 cm/s    Left super femoral mid sys .5 cm/s    Left Prox PFA A PSV 92.7 cm/s    Left popliteal prox sys .3 cm/s    Left popliteal dist sys .2 cm/s    Left Dist PTA .2 cm/s    Left Dist DENIS Velocity 46.1 cm/s   ECHO ADULT COMPLETE    Collection Time: 08/19/22 10:22 AM   Result Value Ref Range    IVSd 1.0 (A) 0.6 - 0.9 cm    LVIDd 5.5 (A) 3.9 - 5.3 cm    LVIDs 3.8 cm    LVOT Diameter 2.2 cm    LVPWd 1.1 (A) 0.6 - 0.9 cm    EF BP 58 55 - 100 %    LV Ejection Fraction A2C 51 %    LV Ejection Fraction A4C 68 %    LV EDV A2C 85 mL    LV EDV A4C 94 mL    LV EDV BP 95 56 - 104 mL    LV ESV A2C 41 mL    LV ESV A4C 30 mL    LV ESV BP 40 19 - 49 mL    RV Free Wall Peak S' 15 cm/s    LA Volume A/L 52 mL    LA Volume 2C 56 (A) 22 - 52 mL    LA Volume 4C 37 22 - 52 mL    LA Volume BP 50 22 - 52 mL    AV Peak Gradient 20 mmHg    AV Mean Gradient 10 mmHg    AV Peak Velocity 2.3 m/s    AV Mean Velocity 1.4 m/s    AV VTI 43.4 cm    MV A Velocity 1.16 m/s    MV E Wave Deceleration Time 192.5 ms    MV E Velocity 0.88 m/s    LV E' Lateral Velocity 10 cm/s    LV E' Septal Velocity 7 cm/s    TAPSE 2.0 1.7 cm    Aortic Root 3.1 cm    Fractional Shortening 2D 31 28 - 44 %    LV ESV Index BP 18 mL/m2    LV EDV Index BP 43 mL/m2    LV ESV Index A4C 14 mL/m2    LV EDV Index A4C 42 mL/m2    LV ESV Index A2C 18 mL/m2    LV EDV Index A2C 38 mL/m2    LVIDd Index 2.48 cm/m2    LVIDs Index 1.71 cm/m2    LV RWT Ratio 0.40     LV Mass 2D 227.4 (A) 67 - 162 g    LV Mass 2D Index 102.4 (A) 43 - 95 g/m2    MV E/A 0.76     E/E' Ratio (Averaged) 10.69     E/E' Lateral 8.80     E/E' Septal 12.57     LA Volume Index BP 23 16 - 34 ml/m2    LA Volume Index A/L 23 16 - 34 mL/m2    LVOT Area 3.8 cm2    LA Volume Index 2C 25 16 - 34 mL/m2    LA Volume Index 4C 17 16 - 34 mL/m2    Ao Root Index 1.40 cm/m2   GLUCOSE, POC    Collection Time: 08/19/22 11:24 AM   Result Value Ref Range    Glucose (POC) 309 (H) 70 - 110 mg/dL       Impression:   Diabetic with cellulitis right lower extremity. Abscess right hallux  Grade 1 Vann ulcer plantar left hallux    Recommendation:   Continue IV antibiotics as per ID Dr. Zaria Vitale  Completed I&D of right hallux abscess,with betadine prep, use of #11 blade to drain abscess. Cultures were taken and sent to Micro. Betadine flush then dressing with xeroform and gauze dressing. Sharp debridement left hallux ulcer and dress with xeroform. Recommend consult to Justin Hernández, [address] [facility] for continued treatment of both hallux wounds.   I will follow her as needed

## 2022-08-19 NOTE — CONSULTS
[facility]  (A Division of [address] [place] [address])                                                                                                                      Jigna Ortiz MD  Office #: - Option # [phone]  Fax #: [phone]     Date of Admission: 8/18/2022Date of Note: 8/19/2022  Reason for Referral: Evaluation and antibiotic management of RLE infection    Thank you for involving me in the care of this patient. Please do not hesitate to contact me on the above number if question or concern. Dr Suzette Diaz on call and I  will round again on Monday. Please call via  or Perfect Serve for on call MD if questions or concerns. Thanks. Current Antimicrobials:    Prior Antimicrobials:  Vanco and zithromax 8/18          Assessment- ID related:  ----------------------------------------------------------------------  RLE cellulitis with blister on R great toe  --LE DVT work up negative  Underlying BL venous stasis and chronic DFU/callsu on big toes          DMT2--poorly controlled A1C 8.8  Chronic resp failure on home oxygen- 2L  Moribdly obese       Recommendation for ID issues I am following:  ------------------------------------------------------------------------    Cont with vancomycin. Add ceftiraxone to give GNR coverage  --monitor bmp closely for renal toxicity    FU wcx/bcx    Podiatry consult pending    -       HPI:  Curlene Kanner is 49 y. o. with PMH of history of asthma, COPD, on home oxygen sleep apnea not using CPAP aneurysms presents with increased redness of her lower extremity-- she reports 1 week of RLE pain. She was sent to ED after imaging study for DVT to her RLE that was done on 8/17-- found to have a blister on R hallux and swelling on her RLE. DVT was not present on US. She denies F/C/R. She  follows with podiatry as OP.          Active Hospital Problems    Diagnosis Date Noted    Cellulitis 08/18/2022     Past Medical History:   Diagnosis Date    Aneurysm ([facility] [address] [facility]     brain (coiled)    Anxiety     Arthritis     generalized    Asthma     CAD (coronary artery disease) 2017    heart stents    COPD     Depression     depression and anxiety    [facility]     type 2    GERD (gastroesophageal reflux disease)     well controlled with meds    Hypertension     Ill-defined condition     Uses 02 at night 2L/min, and as needed in daytime    Other ill-defined conditions(799.89)     fibromyalgia    Stroke ([facility]      Past Surgical History:   Procedure Laterality Date    HX  SECTION      x 3    HX CHOLECYSTECTOMY      HX GYN      hysterectomy    HX HERNIA REPAIR  2013    abdominal     HX OTHER SURGICAL  2004    Brain surgery shunt, coiling    [place] CARDIAC SURG PROCEDURE UNLIST  2017    2 stents     Family History   Problem Relation Age of Onset    Malignant Hyperthermia Neg Hx     Pseudocholinesterase Deficiency Neg Hx     Delayed Awakening Neg Hx     Post-op Nausea/Vomiting Neg Hx     Post-op Cognitive Dysfunction Neg Hx     Emergence Delirium Neg Hx      Social History     Socioeconomic History    Marital status: SINGLE     Spouse name: Not on file    Number of children: Not on file    Years of education: Not on file    Highest education level: Not on file   Occupational History    Not on file   Tobacco Use    Smoking status: Every Day     Packs/day: 0.25     Years: 20.00     Pack years: 5.00     Types: Cigarettes    Smokeless tobacco: Never    Tobacco comments:     advised no smoking 24 h pre-op   Substance and Sexual Activity    Alcohol use: No    Drug use: No    Sexual activity: Not on file   Other Topics Concern     Service Not Asked    Blood Transfusions Not Asked    Caffeine Concern Not Asked    Occupational Exposure Not Asked    Hobby Hazards Not Asked    Sleep Concern Not Asked    Stress Concern Not Asked    Weight Concern Not Asked    Special Diet Not Asked    Back Care Not Asked    Exercise Not Asked    Bike Helmet Not Asked    Seat Belt Not Asked    Self-Exams Not Asked   Social History Narrative    Not on file     Social Determinants of Health     Financial Resource Strain: Not on file   Food Insecurity: Not on file   Transportation Needs: Not on file   Physical Activity: Not on file   Stress: Not on file   Social Connections: Not on file   Intimate Partner Violence: Not on file   Housing Stability: Not on file       Allergies:  Sulfa (sulfonamide antibiotics)     Medications:  Current Facility-Administered Medications   Medication Dose Route Frequency    methylPREDNISolone (PF) (SOLU-MEDROL) injection 20 mg  20 mg IntraVENous Q8H    perflutren lipid microspheres (DEFINITY) in NS bolus IV  1 mL IntraVENous RAD ONCE    albuterol (PROVENTIL HFA, VENTOLIN HFA, PROAIR HFA) inhaler 2 Puff (Patient Supplied)  2 Puff Inhalation Q6H PRN    albuterol-ipratropium (DUO-NEB) 2.5 MG-0.5 MG/3 ML  3 mL Nebulization Q6H RT    DULoxetine (CYMBALTA) capsule 30 mg  30 mg Oral DAILY    ALPRAZolam (XANAX) tablet 1 mg  1 mg Oral DAILY PRN    pantoprazole (PROTONIX) tablet 40 mg  40 mg Oral ACB    roflumilast (DALIRESP) tablet 500 mcg  500 mcg Oral DAILY    pregabalin (LYRICA) capsule 150 mg  150 mg Oral TID    furosemide (LASIX) tablet 20 mg  20 mg Oral DAILY    zolpidem (AMBIEN) tablet 10 mg  10 mg Oral QHS    insulin lispro (HUMALOG) injection   SubCUTAneous AC&HS    glucose chewable tablet 16 g  4 Tablet Oral PRN    glucagon (GLUCAGEN) injection 1 mg  1 mg IntraMUSCular PRN    dextrose 10% infusion 0-250 mL  0-250 mL IntraVENous PRN    morphine injection 2 mg  2 mg IntraVENous Q4H PRN    arformoteroL (BROVANA) neb solution 15 mcg  15 mcg Nebulization BID RT    budesonide (PULMICORT) 500 mcg/2 ml nebulizer suspension  500 mcg Nebulization BID RT    oxyCODONE-acetaminophen (PERCOCET) 5-325 mg per tablet 1 Tablet  1 Tablet Oral Q6H PRN    lisinopriL (PRINIVIL, ZESTRIL) tablet 10 mg  10 mg Oral DAILY    insulin glargine (LANTUS) injection 33 Units  0.3 Units/kg SubCUTAneous QHS    glucose chewable tablet 16 g  4 Tablet Oral PRN    glucagon (GLUCAGEN) injection 1 mg  1 mg IntraMUSCular PRN    dextrose 10% infusion 0-250 mL  0-250 mL IntraVENous PRN    azithromycin (ZITHROMAX) 500 mg in 0.9% sodium chloride 250 mL (VIAL-MATE)  500 mg IntraVENous Q24H    vancomycin (VANCOCIN) 500 mg in 0.9% sodium chloride (MBP/ADV) 100 mL MBP  500 mg IntraVENous Q8H    Vancomycin-Pharmacy-To-Dose  1 Each Other Rx Dosing/Monitoring        ROS:  Pertinent items are noted in the History of Present Illness. Physical Exam:    Temp (24hrs), Av.2 °F (36.8 °C), Min:97.8 °F (36.6 °C), Max:98.6 °F (37 °C)    Visit Vitals  BP (!) 132/90   Pulse 86   Temp 98 °F (36.7 °C)   Resp 20   Ht 5' 8\" (1.727 m)   Wt 109.8 kg (242 lb)   SpO2 99%   BMI 36.80 kg/m²          GEN: WD Moribdly obese, on 2 L NC--not in resp distress. HEENT: Unicteric. EOMI intact  CHEST: Non laboured breathing. CTA  CVS:RRR, no mur/gallop  ABD: Obese/soft. Non tender. Non distended  RASHMI: Deferred  EXT: No apparent swelling or redness on UE/LE joints. Skin: Dry and intact. No rash  --both anterior shins with pigment changes/redness with R> L  CNS: A, OX3. Moves all extremity. CN grossly ok.     Microbiology  All Micro Results       Procedure Component Value Units Date/Time    CULTURE, BLOOD [[id number]] Collected: 22 1435    Order Status: Completed Specimen: Blood Updated: 22     Special Requests: NO SPECIAL REQUESTS        Culture result: NO GROWTH AFTER 16 HOURS       CULTURE, BLOOD [[id number]] Collected: 22 0132    Order Status: Completed Specimen: Blood Updated: 22     Special Requests: NO SPECIAL REQUESTS        Culture result: NO GROWTH AFTER 5 HOURS       CULTURE, BLOOD [[id number]] Collected: 22 1418    Order Status: Completed Specimen: Blood Updated: 22     Special Requests: NO SPECIAL REQUESTS        Culture result: NO GROWTH AFTER 16 HOURS       CULTURE, Ev Callet STAIN [[id number]] Collected: 08/18/22 1420    Order Status: Completed Specimen: Wound Drainage Updated: 08/19/22 0042     Special Requests: NO SPECIAL REQUESTS        GRAM STAIN RARE WBCS SEEN               OCCASIONAL GRAM NEGATIVE COCCOBACILLI           Culture result: PENDING    CULTURE, BLOOD [[id number]] Collected: 08/18/22 1830    Order Status: Canceled Specimen: Blood     CULTURE, BLOOD [[id number]] Collected: 08/18/22 1830    Order Status: Canceled Specimen: Blood               Lab results:    Chemistry  Recent Labs     08/19/22  0132 08/18/22  1418   * 226*    135*   K 4.8 4.8    103   CO2 24 26   BUN 47* 44*   CREA 1.47* 1.27   CA 8.8 9.1   AGAP 4 6   BUCR 32* 35*   AP  --  92   TP  --  8.8*   ALB  --  3.5   GLOB  --  5.3*   AGRAT  --  0.7*       CBC w/ Diff  Recent Labs     08/18/22  1418   WBC 10.6   RBC 3.45*   HGB 9.3*   HCT 29.9*      GRANS 68   LYMPH 22   EOS 3       XR GREAT TOE RT MIN 2 V    Result Date: 8/18/2022  EXAM: XR GREAT TOE RT MIN 2 V CLINICAL INDICATION/HISTORY: infection   > Additional: Right foot great toe pain, swelling COMPARISON: None. TECHNIQUE: 3 views right foot great toe _______________ FINDINGS: Osseous alignment is as expected on the provided nonweightbearing projections. No fracture. No retained radiopaque foreign object. No osseous erosions or periostitis. Significant soft tissue swelling involving the great toe distally. No retained radiopaque foreign object. _______________     Significant soft tissue swelling involving the right foot great toe without evidence of fracture or retained radiopaque foreign object. XR CHEST PORT    Result Date: 8/18/2022  EXAM: One-view chest CLINICAL HISTORY: tachy , COMPARISON: None FINDINGS: Frontal view of the chest demonstrate clear lungs. Cardiac silhouette is normal in size and contour. No acute bony or soft tissue abnormality.      No acute pulmonary process identified.      Procedures/imaging: see electronic medical records for all procedures/Xrays and details which were not copied into this note but were reviewed prior to creation of Plan

## 2022-08-19 NOTE — PROGRESS NOTES
Problem: Diabetes Self-Management  Goal: *Disease process and treatment process  Description: Define diabetes and identify own type of diabetes; list 3 options for treating diabetes. Outcome: Progressing Towards Goal  Goal: *Incorporating nutritional management into lifestyle  Description: Describe effect of type, amount and timing of food on blood glucose; list 3 methods for planning meals. Outcome: Progressing Towards Goal  Goal: *Incorporating physical activity into lifestyle  Description: State effect of exercise on blood glucose levels. Outcome: Progressing Towards Goal  Goal: *Developing strategies to promote health/change behavior  Description: Define the ABC's of diabetes; identify appropriate screenings, schedule and personal plan for screenings. Outcome: Progressing Towards Goal  Goal: *Using medications safely  Description: State effect of diabetes medications on diabetes; name diabetes medication taking, action and side effects. Outcome: Progressing Towards Goal  Goal: *Monitoring blood glucose, interpreting and using results  Description: Identify recommended blood glucose targets  and personal targets. Outcome: Progressing Towards Goal  Goal: *Prevention, detection, treatment of acute complications  Description: List symptoms of hyper- and hypoglycemia; describe how to treat low blood sugar and actions for lowering  high blood glucose level. Outcome: Progressing Towards Goal  Goal: *Prevention, detection and treatment of chronic complications  Description: Define the natural course of diabetes and describe the relationship of blood glucose levels to long term complications of diabetes.   Outcome: Progressing Towards Goal  Goal: *Developing strategies to address psychosocial issues  Description: Describe feelings about living with diabetes; identify support needed and support network  Outcome: Progressing Towards Goal  Goal: *Insulin pump training  Outcome: Progressing Towards Goal  Goal: *Sick day guidelines  Outcome: Progressing Towards Goal  Goal: *Patient Specific Goal (EDIT GOAL, INSERT TEXT)  Outcome: Progressing Towards Goal     Problem: Patient Education: Go to Patient Education Activity  Goal: Patient/Family Education  Outcome: Progressing Towards Goal     Problem: Falls - Risk of  Goal: *Absence of Falls  Description: Document [place] Flow Fall Risk and appropriate interventions in the flowsheet.   Outcome: Progressing Towards Goal  Note: Fall Risk Interventions:  Mobility Interventions: Communicate number of staff needed for ambulation/transfer, Patient to call before getting OOB, PT Consult for mobility concerns, PT Consult for assist device competence         Medication Interventions: Bed/chair exit alarm, Patient to call before getting OOB, Teach patient to arise slowly    Elimination Interventions: Call light in reach, Patient to call for help with toileting needs, Stay With Me (per policy)              Problem: Patient Education: Go to Patient Education Activity  Goal: Patient/Family Education  Outcome: Progressing Towards Goal

## 2022-08-19 NOTE — PROGRESS NOTES
Podiatry:  Patient seen for evaluation and treatment of abscess right big toe and left big toe ulcer. Bedside I&D was performed with cultures taken of her right hallux. Sharp debridement was performed to her left hallux ulcer. Both wounds were dressed with xeroform and gauze. Continue IV antibiotics as per ID Dr. Brenda Farmer. Everardo [place] from wound care will continue treatment of both big toe wounds as needed. There was no need for OR surgical correction of her big toes today. See consult for details. .. Jaron El

## 2022-08-19 NOTE — PROGRESS NOTES
Bedside and Verbal shift change report given to Sammi Simental (oncoming nurse) by Nisha Morocho (offgoing nurse).  Report included the following information SBAR, Kardex, Intake/Output, MAR, Recent Results, and Cardiac Rhythm SR .

## 2022-08-19 NOTE — PROGRESS NOTES
CM called the patient's room to complete assessment. No answer. 1324- Multiple calls to the patient's room. No answer.  will continue to monitor and assist with transition of care needs.     KENYETTA Gustafson, RN  Care Management

## 2022-08-19 NOTE — PROGRESS NOTES
6672 Bedside shift change report given to Rossy Valderrama RN (oncoming nurse) by Kia Culver RN (offgoing nurse). Report included the following information SBAR, Kardex, Intake/Output, MAR, and Recent Results. 1911 Bedside shift change report given to Shirley (oncoming nurse) by Vaishali Crump RN (offgoing nurse). Report included the following information SBAR, Kardex, Intake/Output, MAR, and Recent Results.

## 2022-08-19 NOTE — CONSULTS
Pt seen and examined. Full note to follow    Imp:  Probable cellulitis RLE-- with big blister on right great toe  Swelling/mild erythema R> L on LE-- DVT negative 8/18  Underlying venous stasis likely  Asthma on 2L oxygen at home  Morbidly obese    Plan:  Cover with vancomycin/ceftriaxone emperically  Elevate RLE with 2 pillows  Monitor progress of soft tissue changes  FI BCX/WCX to adjust final ABX plan    Thank you for involving me in the care of this patient    Dr Placido Armijo on call and I  will round again on Monday. Please call via  or Perfect Serve for on call MD if questions or concerns. Thanks. Constanza Mohan MD  [facility])  Office #:     [phone]   Office Fax: [phone]

## 2022-08-19 NOTE — PROGRESS NOTES
Physician Progress Note      Alex Sanderson  CSN #:                  [id number]  :                       1965  ADMIT DATE:       2022 2:02 PM  [address] [place] DATE:  RESPONDING  PROVIDER #:        Elias Sexton MD          QUERY TEXT:    Pt admitted with right lower extremity cellulitis. Pt noted to have DM. If possible, please document in progress notes and discharge summary the relationship, if any, between cellulitis and DM. The medical record reflects the following:  Risk Factors: IV steroids, CAD    Clinical Indicators:  2022, H&P, Dr. Milena Magallanes:  \"1. Cellulitis  She started on Rocephin duplex of the leg is negative and arterial Doppler study will be ordered and podiatry was consulted through the ER areas marked. 6.  Diabetes start on Lantus and sliding scale insulin. \"  WBC:  10.6  temperature:  97.7 to 98.6 degrees oral since admission  heart rate:  >90 bpm  respiratory rate:  18 - 24 bpm  oxygen saturation:  % on oxygen @2LPM NC  Hgb A1c:  8.8  blood glucose:  226 on  ---> 149 on 2022    Treatment: Rocephin, duplex, Doppler, ID consult, podiatry consult, insulin    Thank you,  PAULINA Lopez RN, [email or web address]  Options provided:  -- right lower extremity cellulitis associated with Diabetes  -- right lower extremity cellulitis unrelated to Diabetes  -- Other - I will add my own diagnosis  -- Disagree - Not applicable / Not valid  -- Disagree - Clinically unable to determine / Unknown  -- Refer to Clinical Documentation Reviewer    PROVIDER RESPONSE TEXT:    right lower extremity cellulitis associated with Diabetes. Query created by:  Charley Rai on 2022 11:41 AM      Electronically signed by:  Elias Sexton MD 2022 2:18 PM

## 2022-08-19 NOTE — PROGRESS NOTES
Bedside and Verbal shift change report given to GALE Galvan (oncoming nurse) by Jayden Day (offgoing nurse).  Report included the following information SBAR, Kardex, Intake/Output, MAR, Recent Results, and Cardiac Rhythm SR .

## 2022-08-19 NOTE — PROGRESS NOTES
Hospitalist Progress Note    Patient: Ten Barney MRN: [id number]  CSN: [id number]    YOB: 1965  Age: 49 y.o. Sex: female    DOA: 8/18/2022 LOS:  LOS: 1 day          Chief Complaint:    cellulitis      Assessment/Plan     1. Cellulitis of RLE  No DVT  Blood cx neg  IV vanco and rocephin  Appreciate ID help    2. COPD exacerbation  Steroids IV, PRN and routine nebs  Zithromax to her Rocephin and vancomycin    3. Coronary artery disease with stents     4. History of brain aneurysm and  shunt with coils    5. Tobacco disorder advised to quit     6. Diabetes type 2 IDDM, with hyperglycemia, Lantus and sliding scale insulin     7. Obesity    DVT proph-lovenox    Follow course    Blister debridement done by podiatry right big toe        Disposition :  Patient Active Problem List   Diagnosis Code    Other and unspecified noninfectious gastroenteritis and colitis(558.9) K52.9    Esophageal reflux K21.9    Diabetes mellitus, insulin dependent (IDDM), uncontrolled [id number]    HTN (hypertension) I10    Dehydration E86.0    COPD exacerbation (HCC) J44.1    Incarcerated hernia K46.0    Type II or unspecified type diabetes mellitus without mention of complication, not stated as uncontrolled E11.9    Cellulitis L03.90       Subjective:  Blister right great toe  Leg still sore right side  Breathing ok  No new concerns she states      Review of systems:    Constitutional: denies fevers, chills  Respiratory: mild wheezing and SOB, cough  Cardiovascular: denies chest pain, palpitations  Gastrointestinal: denies nausea, vomiting, diarrhea      Vital signs/Intake and Output:  Visit Vitals  BP (!) 132/90   Pulse 86   Temp 98 °F (36.7 °C)   Resp 20   Ht 5' 8\" (1.727 m)   Wt 109.8 kg (242 lb)   SpO2 97%   BMI 36.80 kg/m²     Current Shift:  No intake/output data recorded. Last three shifts:  No intake/output data recorded.     Exam:    General: obese AAF alert, NAD, OX3  Head/Neck: NCAT, supple, No masses, No lymphadenopathy  CVS:Regular rate and rhythm, no M/R/G, S1/S2 heard, no thrill  Lungs:few scattered wheezes  Abdomen: Soft, Nontender, No distention, Normal Bowel sounds, No hepatomegaly  Extremities: red warm right lower leg circumferential, large blister right big toe, no open lesions or drainage seen  Neuro:grossly normal , follows commands  Psych:appropriate                Labs: Results:       Chemistry Recent Labs     08/19/22  0132 08/18/22  1418   * 226*    135*   K 4.8 4.8    103   CO2 24 26   BUN 47* 44*   CREA 1.47* 1.27   CA 8.8 9.1   AGAP 4 6   BUCR 32* 35*   AP  --  92   TP  --  8.8*   ALB  --  3.5   GLOB  --  5.3*   AGRAT  --  0.7*      CBC w/Diff Recent Labs     08/18/22  1418   WBC 10.6   RBC 3.45*   HGB 9.3*   HCT 29.9*      GRANS 68   LYMPH 22   EOS 3      Cardiac Enzymes No results for input(s): CPK, CKND1, MAKEDA in the last 72 hours. No lab exists for component: CKRMB, TROIP   Coagulation No results for input(s): PTP, INR, APTT, INREXT in the last 72 hours. Lipid Panel No results found for: CHOL, CHOLPOCT, CHOLX, CHLST, CHOLV, 395058, HDL, HDLP, LDL, LDLC, DLDLP, 151417, VLDLC, VLDL, TGLX, TRIGL, TRIGP, TGLPOCT, CHHD, CHHDX   BNP No results for input(s): BNPP in the last 72 hours.    Liver Enzymes Recent Labs     08/18/22  1418   TP 8.8*   ALB 3.5   AP 92      Thyroid Studies No results found for: T4, T3U, TSH, TSHEXT     Procedures/imaging: see electronic medical records for all procedures/Xrays and details which were not copied into this note but were reviewed prior to creation of Martha Mead MD

## 2022-08-19 NOTE — DIABETES MGMT
Diabetes/ Glycemic Control Plan of Care  Recommendations: Recommend very insulin resistant scale for corrective Humalog   Assessment: Patient has a known history of type 2 diabetes. HgbA1c resulted this admission 8.8% which is not within target. BG elevated upon admission at 226 mg/dl and worsened with steroids. POC glucose ranging 304-309 since the initiation of steroids. Patient states she does not take any diabetes medications. Reviewed HgbA1c, patient states she has been drinking Pepsi and other sugar sweetened beverages at home. Encouraged sugar-free fluids to help with blood sugars at home. DX:   1. Cellulitis of right lower extremity           Steroids:   Rx Glucocorticoids (24h ago, onward)       Start     Dose Route Frequency Ordered Stop    08/19/22 1400  methylPREDNISolone (PF) (SOLU-MEDROL) injection 20 mg         20 mg IV EVERY 8 HOURS 08/19/22 0819 --                   Recent Glucose Results:   Lab Results   Component Value Date/Time     (H) 08/19/2022 01:32 AM     (H) 08/18/2022 02:18 PM    GLUCPOC 308 (H) 08/19/2022 07:53 AM    GLUCPOC 304 (H) 08/18/2022 09:56 PM        BG within target range (non-ICU: <180; -180):  [] Yes    [x] No   Current insulin orders: 33 units Lantus, corrective Humalog ACHS  Total Daily Dose previous 24 hours = 49 units (33 units Lantus + 16 units Humalog)    Current A1c:   Lab Results   Component Value Date/Time    Hemoglobin A1c 8.8 (H) 08/18/2022 02:18 PM      equivalent  to ave Blood Glucose of 206 mg/dl for 2-3 months prior to admission  Adequate glycemic control PTA: [] Yes    [x] No   Nutrition/Diet: ADULT DIET Regular; 3 carb choices (45 gm/meal)     Home diabetes medications:   Key Antihyperglycemic Medications       Patient is on no antihyperglycemic meds. Plan/Goals:   Blood glucose will be within target of 70 - 180 mg/dl within 72 hours  Reinforce dietary and medication compliance at home.           Education:  [x] Refer to Diabetes Education Record                       [] Education not indicated at this time                                                          Diabetes Patient/Family Education Record    Factors That May Influence Patients Ability to Learn or Comply with Recommendations   []   Language barrier    []   Cultural needs   []   Motivation    []   Cognitive limitation    []   Physical   []   Education    []   Physiological factors   []   Hearing/vision/speaking impairment   []   Christianity beliefs    []   Financial factors   [x]  Other:   []  No factors identified at this time. Person Instructed:   [x]   Patient   []   Family   []  Other     Preference for Learning:   [x]   Verbal   []   Written   []  Demonstration     Level of Comprehension & Competence:    []  Good                                      [x] Fair                                     []  Poor                             []  Needs Reinforcement   []  Teach back completed    Education Component:   [x]  Medication management, including how to administer insulin (if appropriate) and potential medication interactions    []  Nutritional management - [] Obtained usual meal pattern   []   Basic carbohydrate counting  []  Plate method  [x]  Limit concentrated sweets and avoid sweetened beverages  []  Portion control  []    Avoid skipping meals   []  Exercise   []  Signs, symptoms, and treatment of hyperglycemia and hypoglycemia   [] Prevention, recognition and treatment of hyperglycemia and hypoglycemia   [x]  Importance of blood glucose monitoring  [] Blood Glucose targets   []   Provided patient with blood glucose meter  [x]  Has glucometer and supplies at home   []  Instruction on use of the blood glucose meter and recommended monitoring schedule   [x]  Discuss the importance of HbA1C monitoring. Patients A1c is 8.8 %.  This is equivalent to average glucose of ___ mg/dl for the past 2-3 months.   []  Sick day guidelines   []  Proper use and disposal of lancets, needles, syringes or insulin pens (if appropriate)   []  Potential long-term complications (retinopathy, kidney disease, neuropathy, foot care)   [] Information about whom to contact in case of emergency or for more information    []  Goal:  Patient/family will demonstrate understanding of Diabetes Self- Management Skills by: (date) _______  Plan for post-discharge education or self-management support:    [] Outpatient class schedule provided            [] Patient Declined    [] Scheduled for outpatient classes (date) _______    [] Written information provided  Verify: [] Prior to admission Diabetes medications    Does patient understand how diabetes medications work? ____________________________  Does patient have difficulty obtaining diabetes medications or testing supplies? _________________          Bhargav Parra RD  Glycemic Control Team  [phone]    Monday-Friday   9 am - 3 pm

## 2022-08-19 NOTE — PROGRESS NOTES
-Reason for Admission:   Cellulitis [L03.90]    PCP: Elissa Jewell NP    There are currently no Active Isolations  No active infections     CM anticipates IV ABX. [facility] [place] [facility]                RUR Score:     11%             Resources/supports,as identified by patient/family:     Patient has \"home health\" with [facility] that comes 9-5 seven days/week. Barriers to discharge:             Plan for utilizing home health:    yes                          Likelihood of readmission:            Transition of Care Plan:                    Initial assessment completed with patient. Cognitive status of patient: oriented to time, place, person and situation. Face sheet information confirmed:  yes. The patient designates her daughter Sheila Yeung,  [phone],OA participate in her discharge plan and to receive any needed information. This patient lives in a apartment alone. Patient is not able to navigate steps as needed. Prior to hospitalization, patient was considered to be independent with ADLs/IADLS : no . If not independent,  patient needs assist with : dressing, bathing, food preparation, cooking, and grooming      The patient and daughter will be available to transport patient home upon discharge. The patient already has Rollator, Shower chair, raised toilet seat , and  2L NC home O2 supplied by [facility] equipment available in the home. Patient is currently active with home health. If active, agency name is [facility] in [place]. Patient has stayed in a skilled nursing facility or rehab. Was  stay within last 60 days : no. This patient is on dialysis/days :no       Currently, the discharge plan is Home with Home Health and anticipate IV ABX. The patient states that she can obtain her medications from the pharmacy, and take her medications as directed.     Patient's current insurance is Payor: Nubia Cochran / Plan: [facility] COMMUNITY PLAN MCR / Product Type: Managed Care Medicare /        Care Management Interventions  PCP Verified by CM:  Yes  Last Visit to PCP: 08/12/22  Transition of Care Consult (CM Consult): Discharge Planning  Support Systems: Child(chel)  Confirm Follow Up Transport: Family  The Plan for Transition of Care is Related to the Following Treatment Goals : home with home health  Discharge Location  Patient Expects to be Discharged to[de-identified] Home with home health

## 2022-08-19 NOTE — PROGRESS NOTES
[facility] Pharmacokinetic Monitoring Service - Vancomycin     Ten Barney is a 49 y.o. female starting on vancomycin therapy for Skin/Soft Tissue Infection. Pharmacy consulted by JUS Felix  for monitoring and adjustment. Target Concentration: Goal trough of 10-15 mg/L and AUC/ZAYDA <500 mg*hr/L    Additional Antimicrobials: Azithromycin 500 mg Q24H    Pertinent Laboratory Values: Wt Readings from Last 1 Encounters:   08/18/22 109.8 kg (242 lb)     Temp Readings from Last 1 Encounters:   08/18/22 98.6 °F (37 °C)     No components found for: PROCAL  Estimated Creatinine Clearance: 65.8 mL/min (based on SCr of 1.27 mg/dL).   Recent Labs     08/18/22  1418   WBC 10.6       Pertinent Cultures:  Culture Date Source Results   08/18/22 Lab pending       Plan:  Dosing recommendations based on Bayesian software  Start vancomycin with a loading dose of 2,000 mg once @ 20:00 on 8/18/22, then 500 mg Q8H starting @ 04:00 on 8/19/22  Anticipated AUC of 425  and trough concentration of 15.5 at steady state  Renal labs as indicated   Pharmacy will continue to monitor patient and adjust therapy as indicated    Thank you for the consult,  Cabrera Hardin, [address]  8/18/2022 7:51 PM

## 2022-08-20 LAB
ANION GAP SERPL CALC-SCNC: 4 MMOL/L (ref 3–18)
BUN SERPL-MCNC: 66 MG/DL (ref 7–18)
BUN/CREAT SERPL: 48 (ref 12–20)
CALCIUM SERPL-MCNC: 8.4 MG/DL (ref 8.5–10.1)
CHLORIDE SERPL-SCNC: 110 MMOL/L (ref 100–111)
CO2 SERPL-SCNC: 22 MMOL/L (ref 21–32)
CREAT SERPL-MCNC: 1.38 MG/DL (ref 0.6–1.3)
GLUCOSE BLD STRIP.AUTO-MCNC: 200 MG/DL (ref 70–110)
GLUCOSE BLD STRIP.AUTO-MCNC: 207 MG/DL (ref 70–110)
GLUCOSE BLD STRIP.AUTO-MCNC: 285 MG/DL (ref 70–110)
GLUCOSE BLD STRIP.AUTO-MCNC: 305 MG/DL (ref 70–110)
GLUCOSE SERPL-MCNC: 305 MG/DL (ref 74–99)
POTASSIUM SERPL-SCNC: 6 MMOL/L (ref 3.5–5.5)
SODIUM SERPL-SCNC: 136 MMOL/L (ref 136–145)

## 2022-08-20 PROCEDURE — 74011000258 HC RX REV CODE- 258: Performed by: INTERNAL MEDICINE

## 2022-08-20 PROCEDURE — 36415 COLL VENOUS BLD VENIPUNCTURE: CPT

## 2022-08-20 PROCEDURE — G0378 HOSPITAL OBSERVATION PER HR: HCPCS

## 2022-08-20 PROCEDURE — 74011250637 HC RX REV CODE- 250/637: Performed by: INTERNAL MEDICINE

## 2022-08-20 PROCEDURE — 80048 BASIC METABOLIC PNL TOTAL CA: CPT

## 2022-08-20 PROCEDURE — 65270000046 HC RM TELEMETRY

## 2022-08-20 PROCEDURE — 74011250636 HC RX REV CODE- 250/636: Performed by: PHYSICIAN ASSISTANT

## 2022-08-20 PROCEDURE — 74011250636 HC RX REV CODE- 250/636: Performed by: INTERNAL MEDICINE

## 2022-08-20 PROCEDURE — 74011636637 HC RX REV CODE- 636/637: Performed by: HOSPITALIST

## 2022-08-20 PROCEDURE — 74011636637 HC RX REV CODE- 636/637: Performed by: INTERNAL MEDICINE

## 2022-08-20 PROCEDURE — 82962 GLUCOSE BLOOD TEST: CPT

## 2022-08-20 PROCEDURE — 94760 N-INVAS EAR/PLS OXIMETRY 1: CPT

## 2022-08-20 PROCEDURE — 96376 TX/PRO/DX INJ SAME DRUG ADON: CPT

## 2022-08-20 PROCEDURE — 74011000250 HC RX REV CODE- 250: Performed by: HOSPITALIST

## 2022-08-20 PROCEDURE — 74011250636 HC RX REV CODE- 250/636: Performed by: HOSPITALIST

## 2022-08-20 PROCEDURE — 74011000250 HC RX REV CODE- 250: Performed by: INTERNAL MEDICINE

## 2022-08-20 PROCEDURE — 77010033678 HC OXYGEN DAILY

## 2022-08-20 PROCEDURE — 94640 AIRWAY INHALATION TREATMENT: CPT

## 2022-08-20 RX ORDER — ENOXAPARIN SODIUM 100 MG/ML
30 INJECTION SUBCUTANEOUS EVERY 12 HOURS
Status: DISCONTINUED | OUTPATIENT
Start: 2022-08-20 | End: 2022-08-22 | Stop reason: HOSPADM

## 2022-08-20 RX ORDER — INSULIN GLARGINE 100 [IU]/ML
40 INJECTION, SOLUTION SUBCUTANEOUS
Status: DISCONTINUED | OUTPATIENT
Start: 2022-08-20 | End: 2022-08-22 | Stop reason: HOSPADM

## 2022-08-20 RX ADMIN — IPRATROPIUM BROMIDE AND ALBUTEROL SULFATE 3 ML: .5; 3 SOLUTION RESPIRATORY (INHALATION) at 17:48

## 2022-08-20 RX ADMIN — Medication 9 UNITS: at 12:23

## 2022-08-20 RX ADMIN — ZOLPIDEM TARTRATE 10 MG: 5 TABLET ORAL at 22:11

## 2022-08-20 RX ADMIN — BUDESONIDE 500 MCG: 0.5 INHALANT RESPIRATORY (INHALATION) at 20:17

## 2022-08-20 RX ADMIN — CEFTRIAXONE 1 G: 1 INJECTION, POWDER, FOR SOLUTION INTRAMUSCULAR; INTRAVENOUS at 11:11

## 2022-08-20 RX ADMIN — PANTOPRAZOLE SODIUM 40 MG: 40 TABLET, DELAYED RELEASE ORAL at 06:51

## 2022-08-20 RX ADMIN — LISINOPRIL 10 MG: 5 TABLET ORAL at 08:48

## 2022-08-20 RX ADMIN — METHYLPREDNISOLONE SODIUM SUCCINATE 20 MG: 40 INJECTION, POWDER, FOR SOLUTION INTRAMUSCULAR; INTRAVENOUS at 06:51

## 2022-08-20 RX ADMIN — Medication 12 UNITS: at 08:47

## 2022-08-20 RX ADMIN — Medication 6 UNITS: at 17:39

## 2022-08-20 RX ADMIN — ARFORMOTEROL TARTRATE 15 MCG: 15 SOLUTION RESPIRATORY (INHALATION) at 07:22

## 2022-08-20 RX ADMIN — DULOXETINE HYDROCHLORIDE 30 MG: 30 CAPSULE, DELAYED RELEASE ORAL at 08:47

## 2022-08-20 RX ADMIN — FUROSEMIDE 20 MG: 20 TABLET ORAL at 08:48

## 2022-08-20 RX ADMIN — BUDESONIDE 500 MCG: 0.5 INHALANT RESPIRATORY (INHALATION) at 07:22

## 2022-08-20 RX ADMIN — INSULIN GLARGINE 40 UNITS: 100 INJECTION, SOLUTION SUBCUTANEOUS at 23:12

## 2022-08-20 RX ADMIN — ARFORMOTEROL TARTRATE 15 MCG: 15 SOLUTION RESPIRATORY (INHALATION) at 20:17

## 2022-08-20 RX ADMIN — VANCOMYCIN HYDROCHLORIDE 1000 MG: 1 INJECTION, POWDER, LYOPHILIZED, FOR SOLUTION INTRAVENOUS at 17:38

## 2022-08-20 RX ADMIN — PREGABALIN 150 MG: 75 CAPSULE ORAL at 08:47

## 2022-08-20 RX ADMIN — ROFLUMILAST 500 MCG: 500 TABLET ORAL at 08:48

## 2022-08-20 RX ADMIN — Medication 6 UNITS: at 23:12

## 2022-08-20 RX ADMIN — PREGABALIN 150 MG: 75 CAPSULE ORAL at 17:39

## 2022-08-20 RX ADMIN — OXYCODONE AND ACETAMINOPHEN 1 TABLET: 5; 325 TABLET ORAL at 11:11

## 2022-08-20 RX ADMIN — METHYLPREDNISOLONE SODIUM SUCCINATE 20 MG: 40 INJECTION, POWDER, FOR SOLUTION INTRAMUSCULAR; INTRAVENOUS at 22:14

## 2022-08-20 RX ADMIN — MORPHINE SULFATE 2 MG: 2 INJECTION, SOLUTION INTRAMUSCULAR; INTRAVENOUS at 12:26

## 2022-08-20 RX ADMIN — MORPHINE SULFATE 2 MG: 2 INJECTION, SOLUTION INTRAMUSCULAR; INTRAVENOUS at 17:46

## 2022-08-20 RX ADMIN — PREGABALIN 150 MG: 75 CAPSULE ORAL at 22:13

## 2022-08-20 RX ADMIN — AZITHROMYCIN MONOHYDRATE 500 MG: 500 INJECTION, POWDER, LYOPHILIZED, FOR SOLUTION INTRAVENOUS at 22:12

## 2022-08-20 NOTE — PROGRESS NOTES
Bedside and Verbal shift change report given to Odette Chase RN (oncoming nurse) by Steve German RN (offgoing nurse). Report included the following information SBAR, Kardex, MAR, Recent Results and Cardiac Rhythm .

## 2022-08-20 NOTE — PROGRESS NOTES
Hospitalist Progress Note    Patient: Ivania Reyes MRN: [id number]  CSN: [id number]    YOB: 1965  Age: 49 y.o. Sex: female    DOA: 8/18/2022 LOS:  LOS: 2 days          Chief Complaint:    cellulitis      Assessment/Plan     1. Cellulitis of RLE  No DVT  Blood cx neg  IV vanco and rocephin  Appreciate ID help    2. COPD exacerbation  Steroids IV, PRN and routine nebs  Will wean down today   Zithromax to her Rocephin and vancomycin    3. Coronary artery disease with stents     4. History of brain aneurysm and  shunt with coils    5. Tobacco disorder advised to quit     6. Diabetes type 2 IDDM, with hyperglycemia, Lantus and sliding scale insulin increase today    7. Obesity    8. Hyperkalemia? Hemolysis   Will repeat    DVT proph-lovenox    Follow course    Blister debridement done by podiatry right big toe     PT eval today      Disposition : home 24-48  Patient Active Problem List   Diagnosis Code    Other and unspecified noninfectious gastroenteritis and colitis(558.9) K52.9    Esophageal reflux K21.9    Diabetes mellitus, insulin dependent (IDDM), uncontrolled [id number]    HTN (hypertension) I10    Dehydration E86.0    COPD exacerbation ([facility] [address] J44.1    Incarcerated hernia K46.0    Type II or unspecified type diabetes mellitus without mention of complication, not stated as uncontrolled E11.9    Cellulitis L03.90       Subjective:  Breathing ok  No new concerns she states leg feels better      Review of systems:    Constitutional: denies fevers, chills  Respiratory: mild wheezing and SOB, cough  Cardiovascular: denies chest pain, palpitations  Gastrointestinal: denies nausea, vomiting, diarrhea      Vital signs/Intake and Output:  Visit Vitals  BP (!) 143/90   Pulse 89   Temp 98.3 °F (36.8 °C)   Resp 20   Ht 5' 8\" (1.727 m)   Wt 109.8 kg (242 lb 1 oz)   SpO2 98%   BMI 36.81 kg/m²     Current Shift:  No intake/output data recorded.   Last three shifts:  08/18 1901 - 08/20 0700  In: 1260 [P.O.:960; I.V.:300]  Out: 0     Exam:    General: obese AAF alert, NAD, OX3  Head/Neck: NCAT, supple, No masses, No lymphadenopathy  CVS:Regular rate and rhythm, no M/R/G, S1/S2 heard, no thrill  Lungs:few scattered wheezes  Abdomen: Soft, Nontender, No distention, Normal Bowel sounds, No hepatomegaly  Extremities: red warm right lower leg circumferential, large blister right big toe, no open lesions or drainage seen  Neuro:grossly normal , follows commands  Psych:appropriate                Labs: Results:       Chemistry Recent Labs     08/20/22  0604 08/19/22  0132 08/18/22  1418   * 149* 226*    137 135*   K 6.0* 4.8 4.8    109 103   CO2 22 24 26   BUN 66* 47* 44*   CREA 1.38* 1.47* 1.27   CA 8.4* 8.8 9.1   AGAP 4 4 6   BUCR 48* 32* 35*   AP  --   --  92   TP  --   --  8.8*   ALB  --   --  3.5   GLOB  --   --  5.3*   AGRAT  --   --  0.7*        CBC w/Diff Recent Labs     08/18/22  1418   WBC 10.6   RBC 3.45*   HGB 9.3*   HCT 29.9*      GRANS 68   LYMPH 22   EOS 3        Cardiac Enzymes No results for input(s): CPK, CKND1, MAKEDA in the last 72 hours. No lab exists for component: CKRMB, TROIP   Coagulation No results for input(s): PTP, INR, APTT, INREXT, INREXT in the last 72 hours. Lipid Panel No results found for: CHOL, CHOLPOCT, CHOLX, CHLST, CHOLV, 415155, HDL, HDLP, LDL, LDLC, DLDLP, 752357, VLDLC, VLDL, TGLX, TRIGL, TRIGP, TGLPOCT, CHHD, CHHDX   BNP No results for input(s): BNPP in the last 72 hours.    Liver Enzymes Recent Labs     08/18/22  1418   TP 8.8*   ALB 3.5   AP 92        Thyroid Studies No results found for: T4, T3U, TSH, TSHEXT, TSHEXT     Procedures/imaging: see electronic medical records for all procedures/Xrays and details which were not copied into this note but were reviewed prior to creation of Jimy Mnaley MD

## 2022-08-20 NOTE — PROGRESS NOTES
Pharmacist Review and Automatic Dose Adjustment of Prophylactic Enoxaparin    *Review reason for admission/hospital problem list*    The reviewing pharmacist has made an adjustment to the ordered enoxaparin dose or converted to UFH per the approved [facility] protocol and table as identified below. Curlene Kanner is a 49 y.o. female. No lab exists for component: CREATININE    Estimated Creatinine Clearance: 60.5 mL/min (A) (based on SCr of 1.38 mg/dL (H)).     Height:   Ht Readings from Last 1 Encounters:   08/19/22 172.7 cm (68\")     Weight:  Wt Readings from Last 1 Encounters:   08/19/22 109.8 kg (242 lb 1 oz)               Plan: Based upon the patient's weight and renal function, the ordered enoxaparin dose of 40 mg SQ suzi 24 hrs has been changed/converted to Lovenox 30 mg SQ q 12 hrs      Thank you,  Patriciaann Saint, [facility]

## 2022-08-20 NOTE — PROGRESS NOTES
Problem: Diabetes Self-Management  Goal: *Disease process and treatment process  Description: Define diabetes and identify own type of diabetes; list 3 options for treating diabetes. Outcome: Progressing Towards Goal  Goal: *Incorporating nutritional management into lifestyle  Description: Describe effect of type, amount and timing of food on blood glucose; list 3 methods for planning meals. Outcome: Progressing Towards Goal  Goal: *Incorporating physical activity into lifestyle  Description: State effect of exercise on blood glucose levels. Outcome: Progressing Towards Goal  Goal: *Developing strategies to promote health/change behavior  Description: Define the ABC's of diabetes; identify appropriate screenings, schedule and personal plan for screenings. Outcome: Progressing Towards Goal  Goal: *Using medications safely  Description: State effect of diabetes medications on diabetes; name diabetes medication taking, action and side effects. Outcome: Progressing Towards Goal  Goal: *Monitoring blood glucose, interpreting and using results  Description: Identify recommended blood glucose targets  and personal targets. Outcome: Progressing Towards Goal  Goal: *Prevention, detection, treatment of acute complications  Description: List symptoms of hyper- and hypoglycemia; describe how to treat low blood sugar and actions for lowering  high blood glucose level. Outcome: Progressing Towards Goal  Goal: *Prevention, detection and treatment of chronic complications  Description: Define the natural course of diabetes and describe the relationship of blood glucose levels to long term complications of diabetes.   Outcome: Progressing Towards Goal  Goal: *Developing strategies to address psychosocial issues  Description: Describe feelings about living with diabetes; identify support needed and support network  Outcome: Progressing Towards Goal     Problem: Falls - Risk of  Goal: *Absence of Falls  Description: Document Robert Fall Risk and appropriate interventions in the flowsheet.   Outcome: Progressing Towards Goal  Note: Fall Risk Interventions:  Mobility Interventions: Patient to call before getting OOB         Medication Interventions: Evaluate medications/consider consulting pharmacy    Elimination Interventions: Call light in reach

## 2022-08-21 LAB
ANION GAP SERPL CALC-SCNC: 4 MMOL/L (ref 3–18)
ANION GAP SERPL CALC-SCNC: 6 MMOL/L (ref 3–18)
BACTERIA SPEC CULT: ABNORMAL
BACTERIA SPEC CULT: NORMAL
BUN SERPL-MCNC: 69 MG/DL (ref 7–18)
BUN SERPL-MCNC: 71 MG/DL (ref 7–18)
BUN/CREAT SERPL: 51 (ref 12–20)
BUN/CREAT SERPL: 51 (ref 12–20)
CALCIUM SERPL-MCNC: 9 MG/DL (ref 8.5–10.1)
CALCIUM SERPL-MCNC: 9.3 MG/DL (ref 8.5–10.1)
CHLORIDE SERPL-SCNC: 109 MMOL/L (ref 100–111)
CHLORIDE SERPL-SCNC: 111 MMOL/L (ref 100–111)
CO2 SERPL-SCNC: 23 MMOL/L (ref 21–32)
CO2 SERPL-SCNC: 23 MMOL/L (ref 21–32)
CREAT SERPL-MCNC: 1.35 MG/DL (ref 0.6–1.3)
CREAT SERPL-MCNC: 1.38 MG/DL (ref 0.6–1.3)
GLUCOSE BLD STRIP.AUTO-MCNC: 177 MG/DL (ref 70–110)
GLUCOSE BLD STRIP.AUTO-MCNC: 235 MG/DL (ref 70–110)
GLUCOSE BLD STRIP.AUTO-MCNC: 255 MG/DL (ref 70–110)
GLUCOSE BLD STRIP.AUTO-MCNC: 266 MG/DL (ref 70–110)
GLUCOSE SERPL-MCNC: 176 MG/DL (ref 74–99)
GLUCOSE SERPL-MCNC: 232 MG/DL (ref 74–99)
GRAM STN SPEC: ABNORMAL
POTASSIUM SERPL-SCNC: 5.6 MMOL/L (ref 3.5–5.5)
POTASSIUM SERPL-SCNC: 6.1 MMOL/L (ref 3.5–5.5)
POTASSIUM SERPL-SCNC: 6.2 MMOL/L (ref 3.5–5.5)
SERVICE CMNT-IMP: ABNORMAL
SERVICE CMNT-IMP: ABNORMAL
SERVICE CMNT-IMP: NORMAL
SODIUM SERPL-SCNC: 138 MMOL/L (ref 136–145)
SODIUM SERPL-SCNC: 138 MMOL/L (ref 136–145)
VANCOMYCIN SERPL-MCNC: 16.3 UG/ML (ref 5–40)

## 2022-08-21 PROCEDURE — 74011636637 HC RX REV CODE- 636/637: Performed by: HOSPITALIST

## 2022-08-21 PROCEDURE — 80202 ASSAY OF VANCOMYCIN: CPT

## 2022-08-21 PROCEDURE — 94760 N-INVAS EAR/PLS OXIMETRY 1: CPT

## 2022-08-21 PROCEDURE — 84132 ASSAY OF SERUM POTASSIUM: CPT

## 2022-08-21 PROCEDURE — 36415 COLL VENOUS BLD VENIPUNCTURE: CPT

## 2022-08-21 PROCEDURE — 82962 GLUCOSE BLOOD TEST: CPT

## 2022-08-21 PROCEDURE — 96376 TX/PRO/DX INJ SAME DRUG ADON: CPT

## 2022-08-21 PROCEDURE — 96375 TX/PRO/DX INJ NEW DRUG ADDON: CPT

## 2022-08-21 PROCEDURE — 74011000258 HC RX REV CODE- 258: Performed by: INTERNAL MEDICINE

## 2022-08-21 PROCEDURE — 74011000250 HC RX REV CODE- 250: Performed by: INTERNAL MEDICINE

## 2022-08-21 PROCEDURE — 80048 BASIC METABOLIC PNL TOTAL CA: CPT

## 2022-08-21 PROCEDURE — G0378 HOSPITAL OBSERVATION PER HR: HCPCS

## 2022-08-21 PROCEDURE — 74011000250 HC RX REV CODE- 250: Performed by: HOSPITALIST

## 2022-08-21 PROCEDURE — 74011636637 HC RX REV CODE- 636/637: Performed by: INTERNAL MEDICINE

## 2022-08-21 PROCEDURE — 65270000046 HC RM TELEMETRY

## 2022-08-21 PROCEDURE — 74011250636 HC RX REV CODE- 250/636: Performed by: PHYSICIAN ASSISTANT

## 2022-08-21 PROCEDURE — 74011250636 HC RX REV CODE- 250/636: Performed by: INTERNAL MEDICINE

## 2022-08-21 PROCEDURE — 94640 AIRWAY INHALATION TREATMENT: CPT

## 2022-08-21 PROCEDURE — 74011250637 HC RX REV CODE- 250/637: Performed by: INTERNAL MEDICINE

## 2022-08-21 RX ORDER — DEXTROSE MONOHYDRATE 100 MG/ML
250 INJECTION, SOLUTION INTRAVENOUS ONCE
Status: ACTIVE | OUTPATIENT
Start: 2022-08-21 | End: 2022-08-21

## 2022-08-21 RX ORDER — SODIUM CHLORIDE 0.9 % (FLUSH) 0.9 %
5-40 SYRINGE (ML) INJECTION EVERY 8 HOURS
Status: DISCONTINUED | OUTPATIENT
Start: 2022-08-21 | End: 2022-08-22 | Stop reason: HOSPADM

## 2022-08-21 RX ORDER — PREDNISONE 20 MG/1
20 TABLET ORAL
Status: DISCONTINUED | OUTPATIENT
Start: 2022-08-22 | End: 2022-08-22 | Stop reason: HOSPADM

## 2022-08-21 RX ORDER — SODIUM CHLORIDE 0.9 % (FLUSH) 0.9 %
5-40 SYRINGE (ML) INJECTION AS NEEDED
Status: DISCONTINUED | OUTPATIENT
Start: 2022-08-21 | End: 2022-08-22 | Stop reason: HOSPADM

## 2022-08-21 RX ORDER — DIPHENHYDRAMINE HYDROCHLORIDE 50 MG/ML
12.5 INJECTION, SOLUTION INTRAMUSCULAR; INTRAVENOUS
Status: DISCONTINUED | OUTPATIENT
Start: 2022-08-21 | End: 2022-08-22 | Stop reason: HOSPADM

## 2022-08-21 RX ADMIN — IPRATROPIUM BROMIDE AND ALBUTEROL SULFATE 3 ML: .5; 3 SOLUTION RESPIRATORY (INHALATION) at 16:40

## 2022-08-21 RX ADMIN — DIPHENHYDRAMINE HYDROCHLORIDE 12.5 MG: 50 INJECTION, SOLUTION INTRAMUSCULAR; INTRAVENOUS at 17:02

## 2022-08-21 RX ADMIN — ARFORMOTEROL TARTRATE 15 MCG: 15 SOLUTION RESPIRATORY (INHALATION) at 20:18

## 2022-08-21 RX ADMIN — IPRATROPIUM BROMIDE AND ALBUTEROL SULFATE 3 ML: .5; 3 SOLUTION RESPIRATORY (INHALATION) at 20:18

## 2022-08-21 RX ADMIN — SODIUM CHLORIDE, PRESERVATIVE FREE 10 ML: 5 INJECTION INTRAVENOUS at 21:19

## 2022-08-21 RX ADMIN — BUDESONIDE 500 MCG: 0.5 INHALANT RESPIRATORY (INHALATION) at 20:18

## 2022-08-21 RX ADMIN — Medication 9 UNITS: at 17:02

## 2022-08-21 RX ADMIN — ZOLPIDEM TARTRATE 10 MG: 5 TABLET ORAL at 21:06

## 2022-08-21 RX ADMIN — MORPHINE SULFATE 2 MG: 2 INJECTION, SOLUTION INTRAMUSCULAR; INTRAVENOUS at 14:31

## 2022-08-21 RX ADMIN — BUDESONIDE 500 MCG: 0.5 INHALANT RESPIRATORY (INHALATION) at 07:35

## 2022-08-21 RX ADMIN — CEFTRIAXONE 1 G: 1 INJECTION, POWDER, FOR SOLUTION INTRAMUSCULAR; INTRAVENOUS at 12:07

## 2022-08-21 RX ADMIN — PREGABALIN 150 MG: 75 CAPSULE ORAL at 21:06

## 2022-08-21 RX ADMIN — VANCOMYCIN HYDROCHLORIDE 1000 MG: 1 INJECTION, POWDER, LYOPHILIZED, FOR SOLUTION INTRAVENOUS at 17:02

## 2022-08-21 RX ADMIN — SODIUM CHLORIDE, PRESERVATIVE FREE 10 ML: 5 INJECTION INTRAVENOUS at 14:00

## 2022-08-21 RX ADMIN — PREGABALIN 150 MG: 75 CAPSULE ORAL at 09:24

## 2022-08-21 RX ADMIN — PANTOPRAZOLE SODIUM 40 MG: 40 TABLET, DELAYED RELEASE ORAL at 06:35

## 2022-08-21 RX ADMIN — INSULIN HUMAN 10 UNITS: 100 INJECTION, SOLUTION PARENTERAL at 09:23

## 2022-08-21 RX ADMIN — INSULIN GLARGINE 40 UNITS: 100 INJECTION, SOLUTION SUBCUTANEOUS at 21:07

## 2022-08-21 RX ADMIN — ROFLUMILAST 500 MCG: 500 TABLET ORAL at 09:24

## 2022-08-21 RX ADMIN — METHYLPREDNISOLONE SODIUM SUCCINATE 20 MG: 40 INJECTION, POWDER, FOR SOLUTION INTRAMUSCULAR; INTRAVENOUS at 09:24

## 2022-08-21 RX ADMIN — SODIUM ZIRCONIUM CYCLOSILICATE 10 G: 5 POWDER, FOR SUSPENSION ORAL at 09:24

## 2022-08-21 RX ADMIN — AZITHROMYCIN MONOHYDRATE 500 MG: 500 INJECTION, POWDER, LYOPHILIZED, FOR SOLUTION INTRAVENOUS at 19:57

## 2022-08-21 RX ADMIN — Medication 3 UNITS: at 12:08

## 2022-08-21 RX ADMIN — OXYCODONE AND ACETAMINOPHEN 1 TABLET: 5; 325 TABLET ORAL at 03:14

## 2022-08-21 RX ADMIN — SODIUM CHLORIDE, PRESERVATIVE FREE 10 ML: 5 INJECTION INTRAVENOUS at 09:25

## 2022-08-21 RX ADMIN — Medication 6 UNITS: at 21:07

## 2022-08-21 RX ADMIN — PREGABALIN 150 MG: 75 CAPSULE ORAL at 17:04

## 2022-08-21 RX ADMIN — DULOXETINE HYDROCHLORIDE 30 MG: 30 CAPSULE, DELAYED RELEASE ORAL at 09:24

## 2022-08-21 RX ADMIN — ARFORMOTEROL TARTRATE 15 MCG: 15 SOLUTION RESPIRATORY (INHALATION) at 07:35

## 2022-08-21 RX ADMIN — FUROSEMIDE 20 MG: 20 TABLET ORAL at 09:24

## 2022-08-21 NOTE — PROGRESS NOTES
[address] [facility] Pharmacokinetic Monitoring Service - Vancomycin    Consulting Provider: JUS Smart    Indication: RLE cellulitis with blister on R great toe  Target Concentration: Goal trough of 10-15 mg/L and AUC/ZAYDA <500 mg*hr/L  Day of Therapy: 4  Additional Antimicrobials: azithromycin and ceftriaxone    Pertinent Laboratory Values: Wt Readings from Last 1 Encounters:   08/20/22 109.8 kg (242 lb 1 oz)     Temp Readings from Last 1 Encounters:   08/21/22 98.7 °F (37.1 °C)     No components found for: PROCAL  Estimated Creatinine Clearance: 61.9 mL/min (A) (based on SCr of 1.35 mg/dL (H)). No results for input(s): WBC in the last 72 hours. No lab exists for component: CREATININE,  BUN    Pertinent Cultures:  Culture Date Source Results   08/19 Toe Wound tissue SA +   08/18 Wound drainage MRSA +   MRSA Nasal Swab: N/A. Non-respiratory infection. Neris Page     [email or web address]    Assessment:  Date/Time Current Dose Concentration Timing of Concentration (h) AUC  (ss)   08/21 @ 1232 1000mg every 24 hours Random level: 16.3mcg/ml 08/21 @ 12:32 442mg/L.hr   Note: Serum concentrations collected for AUC dosing may appear elevated if collected in close proximity to the dose administered, this is not necessarily an indication of toxicity    Plan:  Current dosing regimen is therapeutic  Continue current dose- 1000mg every 24 hours  Pharmacy will continue to monitor patient and adjust therapy as indicated    Thank you for the consult,  ELISEO Sanders  8/21/2022 2:48 PM

## 2022-08-21 NOTE — PROGRESS NOTES
Hospitalist Progress Note    Patient: Andres Painting MRN: [id number]  CSN: [id number]    YOB: 1965  Age: 49 y.o. Sex: female    DOA: 8/18/2022 LOS:  LOS: 3 days          Chief Complaint:    cellulitis      Assessment/Plan     1. Cellulitis of RLE  No DVT  Blood cx neg  IV vanco and rocephin  Appreciate ID help    2. COPD exacerbation  Steroids IV,   Zithromax to her Rocephin and vancomycin  Change to po prednisone     3. Coronary artery disease with stents     4. History of brain aneurysm and  shunt with coils    5. Tobacco disorder advised to quit     6. Diabetes type 2 IDDM, with hyperglycemia, Lantus and sliding scale insulin increase today    7. Obesity    8. Hyperkalemia?  Hemolysis   Will repeat still high  Hold lisinopril  Give d10 insulin and lokelma with repeat if pia tomorrow  Will get renal consult    DVT proph-lovenox    Follow course    Blister debridement done by podiatry right big toe     PT eval today      Disposition : home 24-48  Patient Active Problem List   Diagnosis Code    Other and unspecified noninfectious gastroenteritis and colitis(558.9) K52.9    Esophageal reflux K21.9    Diabetes mellitus, insulin dependent (IDDM), uncontrolled [id number]    HTN (hypertension) I10    Dehydration E86.0    COPD exacerbation ([facility] [address] J44.1    Incarcerated hernia K46.0    Type II or unspecified type diabetes mellitus without mention of complication, not stated as uncontrolled E11.9    Cellulitis L03.90       Subjective:  Breathing ok  No new concerns she states leg feels better      Review of systems:    Constitutional: denies fevers, chills  Respiratory: mild wheezing and SOB, cough  Cardiovascular: denies chest pain, palpitations  Gastrointestinal: denies nausea, vomiting, diarrhea      Vital signs/Intake and Output:  Visit Vitals  BP (!) 157/97   Pulse 93   Temp 98.3 °F (36.8 °C)   Resp 20   Ht 5' 8\" (1.727 m)   Wt 109.8 kg (242 lb 1 oz)   SpO2 99%   BMI 36.81 kg/m²     Current Shift:  No intake/output data recorded. Last three shifts:  08/19 1901 - 08/21 0700  In: 1440 [P.O.:1440]  Out: 0               Exam:    General: obese AAF alert, NAD, OX3  Head/Neck: NCAT, supple, No masses, No lymphadenopathy  CVS:Regular rate and rhythm, no M/R/G, S1/S2 heard, no thrill  Lungs:few scattered wheezes  Abdomen: Soft, Nontender, No distention, Normal Bowel sounds, No hepatomegaly  Extremities: red warm right lower leg circumferential, large blister right big toe, no open lesions or drainage seen  Neuro:grossly normal , follows commands  Psych:appropriate                Labs: Results:       Chemistry Recent Labs     08/21/22  1232 08/21/22  0800 08/21/22  0446 08/20/22  0604   *  --  232* 305*     --  138 136   K 5.6* 6.1* 6.2* 6.0*     --  111 110   CO2 23  --  23 22   BUN 69*  --  71* 66*   CREA 1.35*  --  1.38* 1.38*   CA 9.3  --  9.0 8.4*   AGAP 6  --  4 4   BUCR 51*  --  51* 48*        CBC w/Diff No results for input(s): WBC, RBC, HGB, HCT, PLT, GRANS, LYMPH, EOS, HGBEXT, HCTEXT, PLTEXT, HGBEXT, HCTEXT, PLTEXT in the last 72 hours. Cardiac Enzymes No results for input(s): CPK, CKND1, MAKEDA in the last 72 hours. No lab exists for component: CKRMB, TROIP   Coagulation No results for input(s): PTP, INR, APTT, INREXT, INREXT in the last 72 hours. Lipid Panel No results found for: CHOL, CHOLPOCT, CHOLX, CHLST, CHOLV, 170923, HDL, HDLP, LDL, LDLC, DLDLP, 014280, VLDLC, VLDL, TGLX, TRIGL, TRIGP, TGLPOCT, CHHD, CHHDX   BNP No results for input(s): BNPP in the last 72 hours. Liver Enzymes No results for input(s): TP, ALB, TBIL, AP in the last 72 hours.     No lab exists for component: SGOT, GPT, DBIL     Thyroid Studies No results found for: T4, T3U, TSH, TSHEXT, TSHEXT     Procedures/imaging: see electronic medical records for all procedures/Xrays and details which were not copied into this note but were reviewed prior to creation of Feroz Curry MD

## 2022-08-21 NOTE — PROGRESS NOTES
Bedside and Verbal shift change report given to Lauren Cool 71 (oncoming nurse) by Kaitlin Andrews RN (offgoing nurse). Report included the following information SBAR, Kardex, MAR, Recent Results and Cardiac Rhythm .

## 2022-08-21 NOTE — PROGRESS NOTES
Problem: Diabetes Self-Management  Goal: *Disease process and treatment process  Description: Define diabetes and identify own type of diabetes; list 3 options for treating diabetes. Outcome: Progressing Towards Goal  Goal: *Incorporating nutritional management into lifestyle  Description: Describe effect of type, amount and timing of food on blood glucose; list 3 methods for planning meals. Outcome: Progressing Towards Goal  Goal: *Incorporating physical activity into lifestyle  Description: State effect of exercise on blood glucose levels. Outcome: Progressing Towards Goal  Goal: *Developing strategies to promote health/change behavior  Description: Define the ABC's of diabetes; identify appropriate screenings, schedule and personal plan for screenings. Outcome: Progressing Towards Goal  Goal: *Using medications safely  Description: State effect of diabetes medications on diabetes; name diabetes medication taking, action and side effects. Outcome: Progressing Towards Goal  Goal: *Monitoring blood glucose, interpreting and using results  Description: Identify recommended blood glucose targets  and personal targets. Outcome: Progressing Towards Goal  Goal: *Prevention, detection, treatment of acute complications  Description: List symptoms of hyper- and hypoglycemia; describe how to treat low blood sugar and actions for lowering  high blood glucose level. Outcome: Progressing Towards Goal  Goal: *Prevention, detection and treatment of chronic complications  Description: Define the natural course of diabetes and describe the relationship of blood glucose levels to long term complications of diabetes.   Outcome: Progressing Towards Goal  Goal: *Developing strategies to address psychosocial issues  Description: Describe feelings about living with diabetes; identify support needed and support network  Outcome: Progressing Towards Goal     Problem: Falls - Risk of  Goal: *Absence of Falls  Description: Document Robert Fall Risk and appropriate interventions in the flowsheet.   Outcome: Progressing Towards Goal  Note: Fall Risk Interventions:  Mobility Interventions: Assess mobility with egress test         Medication Interventions: Patient to call before getting OOB    Elimination Interventions: Call light in reach

## 2022-08-22 VITALS
SYSTOLIC BLOOD PRESSURE: 147 MMHG | RESPIRATION RATE: 18 BRPM | DIASTOLIC BLOOD PRESSURE: 87 MMHG | TEMPERATURE: 98 F | HEART RATE: 95 BPM | OXYGEN SATURATION: 100 % | HEIGHT: 68 IN | WEIGHT: 242.06 LBS | BODY MASS INDEX: 36.69 KG/M2

## 2022-08-22 LAB
ANION GAP SERPL CALC-SCNC: 4 MMOL/L (ref 3–18)
BUN SERPL-MCNC: 67 MG/DL (ref 7–18)
BUN/CREAT SERPL: 59 (ref 12–20)
CALCIUM SERPL-MCNC: 8.8 MG/DL (ref 8.5–10.1)
CHLORIDE SERPL-SCNC: 115 MMOL/L (ref 100–111)
CO2 SERPL-SCNC: 23 MMOL/L (ref 21–32)
CREAT SERPL-MCNC: 1.14 MG/DL (ref 0.6–1.3)
ECHO AO ROOT DIAM: 3.1 CM
ECHO AO ROOT INDEX: 1.4 CM/M2
ECHO AV MEAN GRADIENT: 10 MMHG
ECHO AV MEAN VELOCITY: 1.4 M/S
ECHO AV PEAK GRADIENT: 20 MMHG
ECHO AV PEAK VELOCITY: 2.3 M/S
ECHO AV VTI: 43.4 CM
ECHO LA VOL 2C: 56 ML (ref 22–52)
ECHO LA VOL 4C: 37 ML (ref 22–52)
ECHO LA VOL BP: 50 ML (ref 22–52)
ECHO LA VOL/BSA BIPLANE: 23 ML/M2 (ref 16–34)
ECHO LA VOLUME AREA LENGTH: 52 ML
ECHO LA VOLUME INDEX A2C: 25 ML/M2 (ref 16–34)
ECHO LA VOLUME INDEX A4C: 17 ML/M2 (ref 16–34)
ECHO LA VOLUME INDEX AREA LENGTH: 23 ML/M2 (ref 16–34)
ECHO LV E' LATERAL VELOCITY: 10 CM/S
ECHO LV E' SEPTAL VELOCITY: 7 CM/S
ECHO LV EDV A2C: 85 ML
ECHO LV EDV A4C: 94 ML
ECHO LV EDV BP: 95 ML (ref 56–104)
ECHO LV EDV INDEX A4C: 42 ML/M2
ECHO LV EDV INDEX BP: 43 ML/M2
ECHO LV EDV NDEX A2C: 38 ML/M2
ECHO LV EJECTION FRACTION A2C: 51 %
ECHO LV EJECTION FRACTION A4C: 68 %
ECHO LV EJECTION FRACTION BIPLANE: 58 % (ref 55–100)
ECHO LV ESV A2C: 41 ML
ECHO LV ESV A4C: 30 ML
ECHO LV ESV BP: 40 ML (ref 19–49)
ECHO LV ESV INDEX A2C: 18 ML/M2
ECHO LV ESV INDEX A4C: 14 ML/M2
ECHO LV ESV INDEX BP: 18 ML/M2
ECHO LV FRACTIONAL SHORTENING: 31 % (ref 28–44)
ECHO LV INTERNAL DIMENSION DIASTOLE INDEX: 2.48 CM/M2
ECHO LV INTERNAL DIMENSION DIASTOLIC: 5.5 CM (ref 3.9–5.3)
ECHO LV INTERNAL DIMENSION SYSTOLIC INDEX: 1.71 CM/M2
ECHO LV INTERNAL DIMENSION SYSTOLIC: 3.8 CM
ECHO LV IVSD: 1 CM (ref 0.6–0.9)
ECHO LV MASS 2D: 227.4 G (ref 67–162)
ECHO LV MASS INDEX 2D: 102.4 G/M2 (ref 43–95)
ECHO LV POSTERIOR WALL DIASTOLIC: 1.1 CM (ref 0.6–0.9)
ECHO LV RELATIVE WALL THICKNESS RATIO: 0.4
ECHO LVOT AREA: 3.8 CM2
ECHO LVOT DIAM: 2.2 CM
ECHO MV A VELOCITY: 1.16 M/S
ECHO MV E DECELERATION TIME (DT): 192.5 MS
ECHO MV E VELOCITY: 0.88 M/S
ECHO MV E/A RATIO: 0.76
ECHO MV E/E' LATERAL: 8.8
ECHO MV E/E' RATIO (AVERAGED): 10.69
ECHO MV E/E' SEPTAL: 12.57
ECHO RV FREE WALL PEAK S': 15 CM/S
ECHO RV TAPSE: 2 CM (ref 1.7–?)
GLUCOSE BLD STRIP.AUTO-MCNC: 148 MG/DL (ref 70–110)
GLUCOSE BLD STRIP.AUTO-MCNC: 230 MG/DL (ref 70–110)
GLUCOSE SERPL-MCNC: 230 MG/DL (ref 74–99)
POTASSIUM SERPL-SCNC: 5 MMOL/L (ref 3.5–5.5)
SODIUM SERPL-SCNC: 142 MMOL/L (ref 136–145)

## 2022-08-22 PROCEDURE — 96376 TX/PRO/DX INJ SAME DRUG ADON: CPT

## 2022-08-22 PROCEDURE — 74011636637 HC RX REV CODE- 636/637: Performed by: INTERNAL MEDICINE

## 2022-08-22 PROCEDURE — 74011250637 HC RX REV CODE- 250/637: Performed by: INTERNAL MEDICINE

## 2022-08-22 PROCEDURE — 36415 COLL VENOUS BLD VENIPUNCTURE: CPT

## 2022-08-22 PROCEDURE — 80048 BASIC METABOLIC PNL TOTAL CA: CPT

## 2022-08-22 PROCEDURE — 74011250636 HC RX REV CODE- 250/636: Performed by: INTERNAL MEDICINE

## 2022-08-22 PROCEDURE — 74011636637 HC RX REV CODE- 636/637: Performed by: HOSPITALIST

## 2022-08-22 PROCEDURE — 74011000250 HC RX REV CODE- 250: Performed by: INTERNAL MEDICINE

## 2022-08-22 PROCEDURE — 97161 PT EVAL LOW COMPLEX 20 MIN: CPT

## 2022-08-22 PROCEDURE — 77010033678 HC OXYGEN DAILY

## 2022-08-22 PROCEDURE — G0378 HOSPITAL OBSERVATION PER HR: HCPCS

## 2022-08-22 PROCEDURE — 82962 GLUCOSE BLOOD TEST: CPT

## 2022-08-22 RX ORDER — PREDNISONE 20 MG/1
20 TABLET ORAL
Qty: 4 TABLET | Refills: 0 | Status: SHIPPED | OUTPATIENT
Start: 2022-08-23 | End: 2022-08-27

## 2022-08-22 RX ORDER — AMOXICILLIN AND CLAVULANATE POTASSIUM 875; 125 MG/1; MG/1
1 TABLET, FILM COATED ORAL 2 TIMES DAILY
Qty: 14 TABLET | Refills: 0 | Status: SHIPPED | OUTPATIENT
Start: 2022-08-22 | End: 2022-08-22

## 2022-08-22 RX ORDER — CEPHALEXIN 500 MG/1
500 CAPSULE ORAL 3 TIMES DAILY
Qty: 21 CAPSULE | Refills: 0 | Status: SHIPPED | OUTPATIENT
Start: 2022-08-22

## 2022-08-22 RX ORDER — CEPHALEXIN 250 MG/1
750 CAPSULE ORAL 3 TIMES DAILY
Status: DISCONTINUED | OUTPATIENT
Start: 2022-08-22 | End: 2022-08-22 | Stop reason: HOSPADM

## 2022-08-22 RX ORDER — AZITHROMYCIN 250 MG/1
500 TABLET, FILM COATED ORAL DAILY
Status: DISCONTINUED | OUTPATIENT
Start: 2022-08-22 | End: 2022-08-22 | Stop reason: HOSPADM

## 2022-08-22 RX ADMIN — SODIUM CHLORIDE, PRESERVATIVE FREE 10 ML: 5 INJECTION INTRAVENOUS at 05:33

## 2022-08-22 RX ADMIN — ARFORMOTEROL TARTRATE 15 MCG: 15 SOLUTION RESPIRATORY (INHALATION) at 08:00

## 2022-08-22 RX ADMIN — PREGABALIN 150 MG: 75 CAPSULE ORAL at 08:54

## 2022-08-22 RX ADMIN — FUROSEMIDE 20 MG: 20 TABLET ORAL at 08:54

## 2022-08-22 RX ADMIN — PREDNISONE 20 MG: 20 TABLET ORAL at 08:56

## 2022-08-22 RX ADMIN — PANTOPRAZOLE SODIUM 40 MG: 40 TABLET, DELAYED RELEASE ORAL at 08:54

## 2022-08-22 RX ADMIN — DULOXETINE HYDROCHLORIDE 30 MG: 30 CAPSULE, DELAYED RELEASE ORAL at 08:54

## 2022-08-22 RX ADMIN — OXYCODONE AND ACETAMINOPHEN 1 TABLET: 5; 325 TABLET ORAL at 03:07

## 2022-08-22 RX ADMIN — CEPHALEXIN 750 MG: 250 CAPSULE ORAL at 12:20

## 2022-08-22 RX ADMIN — DIPHENHYDRAMINE HYDROCHLORIDE 12.5 MG: 50 INJECTION, SOLUTION INTRAMUSCULAR; INTRAVENOUS at 03:10

## 2022-08-22 RX ADMIN — Medication 6 UNITS: at 12:21

## 2022-08-22 RX ADMIN — ROFLUMILAST 500 MCG: 500 TABLET ORAL at 08:55

## 2022-08-22 RX ADMIN — BUDESONIDE 500 MCG: 0.5 INHALANT RESPIRATORY (INHALATION) at 08:00

## 2022-08-22 NOTE — PROGRESS NOTES
[facility]  (A Division of [address] [place] [address])                                                                                                                      Liliam De Souza MD  Office #: - Option # [phone]  Fax #: [phone]     Date of Admission: 8/18/2022Date of Note: 8/22/2022  Reason for Referral: Evaluation and antibiotic management of RLE infection      Current Antimicrobials:    Prior Antimicrobials:  Vanco and zithromax 8/18  Ceftriaxone 1 gm IV 8/19 to date          Assessment- ID related:  ----------------------------------------------------------------------  RLE cellulitis with blister on R great toe  --LE DVT work up negative  --[address] + MSSA 8/18  --I and D- MSSA-8/19  Underlying BL venous stasis and chronic DFU/callsu on big toes          DMT2--poorly controlled A1C 8.8  Chronic resp failure on home oxygen- 2L  Moribdly obese       Recommendation for ID issues I am following:  ------------------------------------------------------------------------  DC vanco/ceftriaxone  Zithromax- per primary team    Can switch over to Keflex 750 mg PO TID X until 8/30/22    Wound care per Podiatry/wound team    OK with DC from ID side  EMMA Iniguez       Subjective:  Pt seen and examined -- R leg feels better. No fever or chills. Less SOB, denies chest pain  Tolerating current ABX    Notes/Labs reviewed          HPI:  Elias Song is 49 y. o. with PMH of history of asthma, COPD, on home oxygen sleep apnea not using CPAP aneurysms presents with increased redness of her lower extremity-- she reports 1 week of RLE pain. She was sent to ED after imaging study for DVT to her RLE that was done on 8/17-- found to have a blister on R hallux and swelling on her RLE. DVT was not present on US. She denies F/C/R. She  follows with podiatry as OP.          Active Hospital Problems    Diagnosis Date Noted    Cellulitis 08/18/2022     Past Medical History:   Diagnosis Date Aneurysm ([facility] [address]     brain (coiled)    Anxiety     Arthritis     generalized    Asthma     CAD (coronary artery disease) 2017    heart stents    COPD     Depression     depression and anxiety    Diabetes ([facility] [address]     type 2    GERD (gastroesophageal reflux disease)     well controlled with meds    Hypertension     Ill-defined condition     Uses 02 at night 2L/min, and as needed in daytime    Other ill-defined conditions(799.89)     fibromyalgia    Stroke ([facility] [address]      Past Surgical History:   Procedure Laterality Date    HX  SECTION      x 3    HX CHOLECYSTECTOMY      HX GYN      hysterectomy    HX HERNIA REPAIR  2013    abdominal     HX OTHER SURGICAL  2004    Brain surgery shunt, coiling    [place] CARDIAC SURG PROCEDURE UNLIST  2017    2 stents     Family History   Problem Relation Age of Onset    Malignant Hyperthermia Neg Hx     Pseudocholinesterase Deficiency Neg Hx     Delayed Awakening Neg Hx     Post-op Nausea/Vomiting Neg Hx     Post-op Cognitive Dysfunction Neg Hx     Emergence Delirium Neg Hx      Social History     Socioeconomic History    Marital status: SINGLE     Spouse name: Not on file    Number of children: Not on file    Years of education: Not on file    Highest education level: Not on file   Occupational History    Not on file   Tobacco Use    Smoking status: Every Day     Packs/day: 0.25     Years: 20.00     Pack years: 5.00     Types: Cigarettes    Smokeless tobacco: Never    Tobacco comments:     advised no smoking 24 h pre-op   Substance and Sexual Activity    Alcohol use: No    Drug use: No    Sexual activity: Not on file   Other Topics Concern     Service Not Asked    Blood Transfusions Not Asked    Caffeine Concern Not Asked    Occupational Exposure Not Asked    Hobby Hazards Not Asked    Sleep Concern Not Asked    Stress Concern Not Asked    Weight Concern Not Asked    Special Diet Not Asked    Back Care Not Asked    Exercise Not Asked    Bike Helmet Not Asked Seat Belt Not Asked    Self-Exams Not Asked   Social History Narrative    Not on file     Social Determinants of Health     Financial Resource Strain: Not on file   Food Insecurity: Not on file   Transportation Needs: Not on file   Physical Activity: Not on file   Stress: Not on file   Social Connections: Not on file   Intimate Partner Violence: Not on file   Housing Stability: Not on file       Allergies:  Sulfa (sulfonamide antibiotics)     Medications:  Current Facility-Administered Medications   Medication Dose Route Frequency    cephALEXin (KEFLEX) capsule 750 mg  750 mg Oral TID    sodium chloride (NS) flush 5-40 mL  5-40 mL IntraVENous Q8H    sodium chloride (NS) flush 5-40 mL  5-40 mL IntraVENous PRN    diphenhydrAMINE (BENADRYL) injection 12.5 mg  12.5 mg IntraVENous Q6H PRN    predniSONE (DELTASONE) tablet 20 mg  20 mg Oral DAILY WITH BREAKFAST    insulin glargine (LANTUS) injection 40 Units  40 Units SubCUTAneous QHS    enoxaparin (LOVENOX) injection 30 mg  30 mg SubCUTAneous Q12H    albuterol-ipratropium (DUO-NEB) 2.5 MG-0.5 MG/3 ML  3 mL Nebulization Q4H PRN    albuterol (PROVENTIL HFA, VENTOLIN HFA, PROAIR HFA) inhaler 2 Puff (Patient Supplied)  2 Puff Inhalation Q6H PRN    DULoxetine (CYMBALTA) capsule 30 mg  30 mg Oral DAILY    ALPRAZolam (XANAX) tablet 1 mg  1 mg Oral DAILY PRN    pantoprazole (PROTONIX) tablet 40 mg  40 mg Oral ACB    roflumilast (DALIRESP) tablet 500 mcg  500 mcg Oral DAILY    pregabalin (LYRICA) capsule 150 mg  150 mg Oral TID    furosemide (LASIX) tablet 20 mg  20 mg Oral DAILY    zolpidem (AMBIEN) tablet 10 mg  10 mg Oral QHS    insulin lispro (HUMALOG) injection   SubCUTAneous AC&HS    glucagon (GLUCAGEN) injection 1 mg  1 mg IntraMUSCular PRN    morphine injection 2 mg  2 mg IntraVENous Q4H PRN    arformoteroL (BROVANA) neb solution 15 mcg  15 mcg Nebulization BID RT    budesonide (PULMICORT) 500 mcg/2 ml nebulizer suspension  500 mcg Nebulization BID RT oxyCODONE-acetaminophen (PERCOCET) 5-325 mg per tablet 1 Tablet  1 Tablet Oral Q6H PRN    [Held by provider] lisinopriL (PRINIVIL, ZESTRIL) tablet 10 mg  10 mg Oral DAILY    glucose chewable tablet 16 g  4 Tablet Oral PRN    dextrose 10% infusion 0-250 mL  0-250 mL IntraVENous PRN    azithromycin (ZITHROMAX) 500 mg in 0.9% sodium chloride 250 mL (VIAL-MATE)  500 mg IntraVENous Q24H        ROS:  Pertinent items are noted in the History of Present Illness. Physical Exam:    Temp (24hrs), Av.1 °F (36.7 °C), Min:97.6 °F (36.4 °C), Max:98.7 °F (37.1 °C)    Visit Vitals  BP (!) 143/88   Pulse 88   Temp 97.9 °F (36.6 °C)   Resp 18   Ht 5' 8\" (1.727 m)   Wt 109.8 kg (242 lb 1 oz)   SpO2 100%   BMI 36.81 kg/m²          GEN: WD Moribdly obese, on 2 L NC--not in resp distress. HEENT: Unicteric. EOMI intact  CHEST: Non laboured breathing. ABD: Obese/soft. Non tender. Non distended  RASHMI: Deferred  EXT: No apparent swelling or redness on UE/LE joints. Skin: Dry and intact. No rash  --removes skin over the blister- R hallux- clean wound base  Swelling nd warmth on RLE-- improved  CNS: A, OX3. Moves all extremity. CN grossly ok.     Microbiology  All Micro Results       Procedure Component Value Units Date/Time    CULTURE, Birdie Police STAIN [[id number]]  (Abnormal) Collected: 22 1321    Order Status: Completed Specimen: Wound from [facility] Toe Updated: 22 1346     Special Requests: NO SPECIAL REQUESTS        GRAM STAIN FEW WBCS SEEN         NO ORGANISMS SEEN        Culture result:       HEAVY STAPHYLOCOCCUS AUREUS REFER TO [id number] FOR SENSITIVITIES          CULTURE, Birdie Police STAIN [[id number]]  (Abnormal)  (Susceptibility) Collected: 22 1420    Order Status: Completed Specimen: Wound Drainage Updated: 22 1029     Special Requests: NO SPECIAL REQUESTS        GRAM STAIN RARE WBCS SEEN               OCCASIONAL GRAM NEGATIVE COCCOBACILLI           Culture result:       RARE STAPHYLOCOCCUS AUREUS                  LIGHT MIXED SKIN ELA ISOLATED          CULTURE, ANAEROBIC [[id number]] Collected: 08/19/22 1321    Order Status: Completed Specimen: Great Toe Updated: 08/21/22 1010     Special Requests: NO SPECIAL REQUESTS        Culture result: NO ANAEROBES ISOLATED       CULTURE, BLOOD [[id number]] Collected: 08/18/22 1418    Order Status: Completed Specimen: Blood Updated: 08/21/22 0716     Special Requests: NO SPECIAL REQUESTS        Culture result: NO GROWTH 3 DAYS       CULTURE, BLOOD [[id number]] Collected: 08/18/22 1435    Order Status: Completed Specimen: Blood Updated: 08/21/22 0716     Special Requests: NO SPECIAL REQUESTS        Culture result: NO GROWTH 3 DAYS       CULTURE, BLOOD [[id number]] Collected: 08/19/22 0132    Order Status: Completed Specimen: Blood Updated: 08/21/22 0716     Special Requests: NO SPECIAL REQUESTS        Culture result: NO GROWTH 2 DAYS       CULTURE, Dulcy Bhavani STAIN [[id number]] Collected: 08/19/22 1321    Order Status: Canceled Specimen: Wound from [facility]     CULTURE, BLOOD [[id number]] Collected: 08/18/22 1830    Order Status: Canceled Specimen: Blood     CULTURE, BLOOD [[id number]] Collected: 08/18/22 1830    Order Status: Canceled Specimen: Blood               Lab results:    Chemistry  Recent Labs     08/22/22  0510 08/21/22  1232 08/21/22  0800 08/21/22  0446   * 176*  --  232*    138  --  138   K 5.0 5.6* 6.1* 6.2*   * 109  --  111   CO2 23 23  --  23   BUN 67* 69*  --  71*   CREA 1.14 1.35*  --  1.38*   CA 8.8 9.3  --  9.0   AGAP 4 6  --  4   BUCR 59* 51*  --  51*       CBC w/ Diff  No results for input(s): WBC, RBC, HGB, HCT, PLT, GRANS, LYMPH, EOS, HGBEXT, HCTEXT, PLTEXT, HGBEXT, HCTEXT, PLTEXT in the last 72 hours. XR GREAT TOE RT MIN 2 V    Result Date: 8/18/2022  EXAM: XR GREAT TOE RT MIN 2 V CLINICAL INDICATION/HISTORY: infection   > Additional: Right foot great toe pain, swelling COMPARISON: None.  TECHNIQUE: 3 views right foot great toe _______________ FINDINGS: Osseous alignment is as expected on the provided nonweightbearing projections. No fracture. No retained radiopaque foreign object. No osseous erosions or periostitis. Significant soft tissue swelling involving the great toe distally. No retained radiopaque foreign object. _______________     Significant soft tissue swelling involving the right foot great toe without evidence of fracture or retained radiopaque foreign object. XR CHEST PORT    Result Date: 8/18/2022  EXAM: One-view chest CLINICAL HISTORY: tachy , COMPARISON: None FINDINGS: Frontal view of the chest demonstrate clear lungs. Cardiac silhouette is normal in size and contour. No acute bony or soft tissue abnormality. No acute pulmonary process identified.      Procedures/imaging: see electronic medical records for all procedures/Xrays and details which were not copied into this note but were reviewed prior to creation of Plan

## 2022-08-22 NOTE — PROGRESS NOTES
Bedside and Verbal shift change report given to Philip, [address]  (oncoming nurse) by Eric Maloney RN (offgoing nurse). Report included the following information SBAR, Kardex, MAR and Recent Results. Alert and oriented x 4. Medications administered. I & O monitored. No c/o pain.

## 2022-08-22 NOTE — PROGRESS NOTES
Problem: Diabetes Self-Management  Goal: *Disease process and treatment process  Description: Define diabetes and identify own type of diabetes; list 3 options for treating diabetes. Outcome: Progressing Towards Goal  Goal: *Incorporating nutritional management into lifestyle  Description: Describe effect of type, amount and timing of food on blood glucose; list 3 methods for planning meals. Outcome: Progressing Towards Goal  Goal: *Incorporating physical activity into lifestyle  Description: State effect of exercise on blood glucose levels. Outcome: Progressing Towards Goal  Goal: *Developing strategies to promote health/change behavior  Description: Define the ABC's of diabetes; identify appropriate screenings, schedule and personal plan for screenings. Outcome: Progressing Towards Goal  Goal: *Using medications safely  Description: State effect of diabetes medications on diabetes; name diabetes medication taking, action and side effects. Outcome: Progressing Towards Goal  Goal: *Monitoring blood glucose, interpreting and using results  Description: Identify recommended blood glucose targets  and personal targets. Outcome: Progressing Towards Goal  Goal: *Prevention, detection, treatment of acute complications  Description: List symptoms of hyper- and hypoglycemia; describe how to treat low blood sugar and actions for lowering  high blood glucose level. Outcome: Progressing Towards Goal  Goal: *Prevention, detection and treatment of chronic complications  Description: Define the natural course of diabetes and describe the relationship of blood glucose levels to long term complications of diabetes.   Outcome: Progressing Towards Goal  Goal: *Developing strategies to address psychosocial issues  Description: Describe feelings about living with diabetes; identify support needed and support network  Outcome: Progressing Towards Goal  Goal: *Insulin pump training  Outcome: Progressing Towards Goal  Goal: *Sick day guidelines  Outcome: Progressing Towards Goal  Goal: *Patient Specific Goal (EDIT GOAL, INSERT TEXT)  Outcome: Progressing Towards Goal     Problem: Patient Education: Go to Patient Education Activity  Goal: Patient/Family Education  Outcome: Progressing Towards Goal     Problem: Falls - Risk of  Goal: *Absence of Falls  Description: Document Adán Jordan Fall Risk and appropriate interventions in the flowsheet.   Outcome: Progressing Towards Goal  Note: Fall Risk Interventions:  Mobility Interventions: Bed/chair exit alarm, OT consult for ADLs, Patient to call before getting OOB         Medication Interventions: Patient to call before getting OOB, Evaluate medications/consider consulting pharmacy, Bed/chair exit alarm    Elimination Interventions: Bed/chair exit alarm, Call light in reach, Stay With Me (per policy), Toilet paper/wipes in reach              Problem: Patient Education: Go to Patient Education Activity  Goal: Patient/Family Education  Outcome: Progressing Towards Goal

## 2022-08-22 NOTE — PROGRESS NOTES
IV to PO Conversion     Patient: Lianet Hicks   MRN#: [id number]   Admission Date: 315342     IV TO PO  Medication(s) Azithromycin   Oral Meds  Yes   Diet:     Active Orders   Diet    ADULT DIET Regular; 5 carb choices (75 gm/meal); Low Potassium (Less than 3000 mg/day)      TEMP 97.9 °F (36.6 °C)   WBC Lab Results   Component Value Date/Time    WBC 10.6 08/18/2022 02:18 PM           Pharmacy Dosing Services:  Summary:   Does the patient meet all criteria for IV to PO switch as listed below? Yes   Is the patient excluded from IV to PO automatic switch based on criteria listed below? No     Criteria for IV to PO switch - Antibiotics   Received IV therapy for at least 24 hours   2. Functioning GI tract   Taking scheduled oral medications  Tolerating diet more advanced than clear liquids   3. No signs/symptoms of shock  No vasopressor support    Exclusion Criteria   Patient is being treated for an infection that requires IV therapy such as: endocarditis, osteomyelitis, meningitis, pancreatitis (antibiotics only)   NG tube with continous suction   Nausea and/or vomiting or severe diarrhea within past 24 hours  Malabsorption syndromes, partial or total gastrectomy, ileus, gastric outlet or bowel obstruction  Active GI bleeding   Significant painful oral ulceration  Unable to swallow  On total parenteral nutrition with a NPO order  NPO  Febrile in last 24 hours (antibiotics only)  Clinically deteriorating or unstable (antibiotics only)      Assessment/Recommendation:    Azithromycin 500 mg IV daily was changed to Azithromycin 500 mg PO daily     This IV to PO conversion is based on the [facility] [employer] approved automatic conversion policy for eligible patients.       MALENA Jenkins [facility], [facility]   Clinical Pharmacist  [phone]

## 2022-08-22 NOTE — PROGRESS NOTES
conducted an initial consultation and Spiritual Assessment for [facility], who is a 49 y.o.,female. Patients Primary Language is: [place]. According to the patients EMR Sabianism Affiliation is: [place]. The reason the Patient came to the hospital is:   Patient Active Problem List    Diagnosis Date Noted    Cellulitis 08/18/2022    Type II or unspecified type diabetes mellitus without mention of complication, not stated as uncontrolled 04/17/2013    Incarcerated hernia 04/16/2013    COPD exacerbation ([facility] [address] 08/16/2012    Other and unspecified noninfectious gastroenteritis and colitis(558.9) 08/15/2012    Esophageal reflux 08/15/2012    Diabetes mellitus, insulin dependent (IDDM), uncontrolled 08/15/2012    HTN (hypertension) 08/15/2012    Dehydration 08/15/2012        The  provided the following Interventions:  Initiated a relationship of care and support. Listened empathically. Provided information about Spiritual Care Services. Offered assurance of prayer on patient's behalf. Chart reviewed. The following outcomes where achieved:   confirmed Patient's Sabianism Affiliation. Patient processed feeling about current hospitalization. Patient expressed gratitude for 's visit. Assessment:  Patient does not have any Episcopalian/cultural needs that will affect patients preferences in health care. Plan:  Chaplains will continue to follow and will provide pastoral care on an as needed/requested basis.  recommends bedside caregivers page  on duty if patient shows signs of acute spiritual or emotional distress. Rev.  Geovany Mcconnell, Certified Respecting [address] [place] [address]  [phone]

## 2022-08-22 NOTE — PROGRESS NOTES
Problem: Mobility Impaired (Adult and Pediatric)  Goal: *Acute Goals and Plan of Care (Insert Text)  Note:   physical Therapy EVALUATION & Discharge    Patient: Christian Khalil (49 y.o. female)  Date: 2022  Primary Diagnosis: Cellulitis [L03.90]  Precautions:  Fall    ASSESSMENT AND RECOMMENDATIONS:  Based on the objective data described below, the patient presents with good strength and balance. Pt performed sit to stand with supervision. Patient ambulated 120 feet with Rollator, GB applied, supervision. Pt tolerated session well as evidenced by no c/o increased pain. Instructed to attempt more heel weight bearing on R LE to off weight great toe. Pt had no orders for surgical shoe. Pt appears at baseline level with regards to mobility. No further acute skilled PT needs required at this time. Discharge Recommendations: None  Further Equipment Recommendations for Discharge: N/A      SUBJECTIVE:   Patient stated I am ready to go home.     OBJECTIVE DATA SUMMARY:     Past Medical History:   Diagnosis Date    Aneurysm ([address]     brain (coiled)    Anxiety     Arthritis     generalized    Asthma     CAD (coronary artery disease) 2017    heart stents    COPD     Depression     depression and anxiety    Diabetes ([address]     type 2    GERD (gastroesophageal reflux disease)     well controlled with meds    Hypertension     Ill-defined condition     Uses 02 at night 2L/min, and as needed in daytime    Other ill-defined conditions(799.89)     fibromyalgia    Stroke ([address]      Past Surgical History:   Procedure Laterality Date    HX  SECTION      x 3    HX CHOLECYSTECTOMY      HX GYN      hysterectomy    HX HERNIA REPAIR  2013    abdominal     HX OTHER SURGICAL  2004    Brain surgery shunt, coiling    [place] CARDIAC SURG PROCEDURE UNLIST  2017    2 stents     Barriers to Learning/Limitations: None  Compensate with: visual, verbal, tactile, kinesthetic cues/model  Prior Level of Function/Home Situation: Independent amb without AD  Home Situation  Home Environment: Private residence  # Steps to Enter: 0  One/Two Story Residence: One story  Living Alone: No  Support Systems: Other Family Member(s)  Patient Expects to be Discharged to[de-identified] Home  Current DME Used/Available at Home: Walker, rollator  Strength:    Strength: Generally decreased, functional  Tone & Sensation:   Tone: Normal  Sensation: Impaired  Range Of Motion:  AROM: Generally decreased, functional  Functional Mobility:  Transfers:  Sit to Stand: Supervision  Stand to Sit: Supervision  Balance:   Sitting: Intact  Standing: Intact; With support  Ambulation/Gait Training:  Distance (ft): 120 Feet (ft)  Assistive Device: Gait belt;Walker, rollator  Ambulation - Level of Assistance: Supervision   Gait Description (WDL): Exceptions to WDL  Gait Abnormalities: Decreased step clearance  Base of Support: Widened   Speed/Aliyah: Pace decreased (<100 feet/min)  Step Length: Left shortened;Right shortened  Pain:  Pain Scale 1: Numeric (0 - 10)  Pain Intensity 1: 0  Pain Location 1: Leg  Activity Tolerance:   Good  Please refer to the flowsheet for vital signs taken during this treatment. After treatment:   []         Patient left in no apparent distress sitting up in chair  [x]         Patient left in no apparent distress in bed  [x]         Call bell left within reach  [x]         Nursing notified  []         Caregiver present  []         Bed alarm activated    COMMUNICATION/EDUCATION:   [x]         Fall prevention education was provided and the patient/caregiver indicated understanding. [x]         Patient/family have participated as able in goal setting and plan of care. [x]         Patient/family agree to work toward stated goals and plan of care. []         Patient understands intent and goals of therapy, but is neutral about his/her participation. []         Patient is unable to participate in goal setting and plan of care.     Thank you for this referral.  Mariano Monae   Time Calculation: 11 mins   Eval Complexity: History: MEDIUM  Complexity : 1-2 comorbidities / personal factors will impact the outcome/ POC Exam:LOW Complexity : 1-2 Standardized tests and measures addressing body structure, function, activity limitation and / or participation in recreation  Presentation: LOW Complexity : Stable, uncomplicated  Clinical Decision Making:Low Complexity    Overall Complexity:LOW

## 2022-08-23 NOTE — DISCHARGE SUMMARY
[address]    Name:  Trena Pineda  MR#:   [id number]  :  1965  ACCOUNT #:  [de-identified]  ADMIT DATE:  2022  DISCHARGE DATE:  2022    DISCHARGE DIAGNOSES:  1. Cellulitis of the right lower extremity. 2.  Chronic obstructive pulmonary disease exacerbation. 3.  Chronic obstructive pulmonary disease. 4.  Coronary disease. 5.  Previous brain aneurysm. 6.  Obesity. 7.  Hyperkalemia, resolved. 8.  Diabetes mellitus with hyperglycemia. DISCHARGE INSTRUCTIONS:  The patient is discharging home today. Follow up with her primary care in 3-7 days. DISCHARGE MEDICATIONS:  1. Prednisone 20 mg, taper over the next week. 2.  Keflex 500 mg three times daily for seven days. 3.  Spiriva one cap inhalation daily. 4.  Albuterol two puffs every four hours as needed. 5.  Ambien 10 mg at night. 6.  Xanax 1 mg daily as needed. 7.  Lasix 20 mg daily. 8.  Lyrica 75 mg twice daily. 9.  Nexium 40 mg daily. 10.  DuoNeb solution q.4 hours as needed for wheezing. 11.  Daliresp one tab daily. 12.  Cymbalta 60 mg daily. She is advised on tobacco cessation, diabetic diet, and monitoring her blood sugars at home with followup as noted. HOSPITAL COURSE:  The patient is 49years old. She is a diabetic patient who came into the hospital with increasing redness and pain in the right lower extremity. She had been on outpatient antibiotics, but had not had notable improvement. There was hypoxia in the emergency room, but she did not tell anyone at that time that she uses home oxygen. So, she did have increasing wheezing that required treatment for underlying COPD, that resolved quickly. She was admitted for further IV antibiotics for cellulitis. She had a consultation with Infectious Disease, who recommended intravenous antibiotics and then transition to oral today, as she has improved.   She had bedside debridement of a large blister on her right toe by Podiatry and she tolerated that well. The patient has been doing well over the last 48 hours and she is stable for discharge today. PHYSICAL EXAMINATION:  GENERAL:  I have examined her this morning. She is in good spirits, ready for discharge. Denies any new complaints. VITAL SIGNS:  Blood pressure is stable at 142/95, pulse 94, temperature 97.7, respiratory rate 18, and the patient is saturating at 100% on room air. LUNGS:  Clear. CARDIAC EXAM:  Regular rate and rhythm. LOWER EXTREMITY:  Redness on the right leg has significantly improved with 1+ edema down from 2 to 3+ and blister is removed on the right big toe with drying tissue there. No evidence of discharge or drainage. The patient has been advised to follow up with her primary care, nurse practitioner, Sakshi Rossi, and the patient is stable for release from the hospital today on oral antibiotics, which were called to her pharmacy. Of note, there was a duplex done that showed patent vessels arterialized and a duplex of the lower extremity showed no DVT. The patient was monitored from a diabetic standpoint during her stay. COPD was baseline by the time she was discharged and she has oxygen therapy at home to continue using. She was also noted to be treated for hyperkalemia, that was resolved by the time of her discharge and mild renal insufficiency was also resolved with the creatinine down to 1.14 on the discharge day. Forty five minutes discharge time.       MD CAMELIA Hedrick/ANNA [email or web address]  D:  08/22/2022 17:38  T:  08/23/2022 6:39  JOB #:  [id number]

## 2022-08-23 NOTE — PROGRESS NOTES
Physician Progress Note      Betty Fairbanks  CSN #:                  [id number]  :                       1965  ADMIT DATE:       2022 2:02 PM  [address] DATE:        2022 2:34 PM  RESPONDING  PROVIDER #:        Esmer Ghosh DPM          QUERY TEXT:    Patient admitted with cellulitis. Per Op note dated   documentation of  sharp  debridement of left hallux. To accurately reflect the procedure performed please document if debridement was excisional or nonexcisional and the deepest depth of tissue removed as down to and including: The medical record reflects the following:  Risk Factors: diabetes  Clinical Indicators: per procedure note- Sharp debridement left hallux ulcer and dress with xeroform  Treatment: as above    Thank you,   Nedra MARCOSS  [email or web address]  Options provided:  -- Nonexcisional debridement of skin  -- Excisional debridement of skin  -- Nonexcisional debridement of subcutaneous tissue  -- Excisional debridement of subcutaneous tissue  -- Nonexcisional debridement of fascia  -- Excisional debridement of fascia  -- Nonexcisional debridement of muscle  -- Excisional debridement  of  bone  -- Non- Excisional debridement  of  bone  -- Other - I will add my own diagnosis  -- Disagree - Not applicable / Not valid  -- Disagree - Clinically unable to determine / Unknown  -- Refer to Clinical Documentation Reviewer    PROVIDER RESPONSE TEXT:    Excisional debridement of subcutaneous tissue of left hallux was performed during procedure on .     Query created by: Pankaj Garcia on 2022 11:05 AM      Electronically signed by:  Esmer Ghosh DPM 2022 11:00 AM

## 2022-08-24 LAB
BACTERIA SPEC CULT: NORMAL
BACTERIA SPEC CULT: NORMAL
SERVICE CMNT-IMP: NORMAL
SERVICE CMNT-IMP: NORMAL

## 2022-08-25 LAB
BACTERIA SPEC CULT: NORMAL
SERVICE CMNT-IMP: NORMAL

## 2022-09-06 ENCOUNTER — APPOINTMENT (OUTPATIENT)
Dept: WOUND CARE | Age: 57
End: 2022-09-06
Attending: PODIATRIST

## 2022-09-13 ENCOUNTER — HOSPITAL ENCOUNTER (OUTPATIENT)
Dept: WOUND CARE | Age: 57
Discharge: HOME | End: 2022-09-13
Attending: PODIATRIST
Payer: MEDICARE

## 2022-09-13 DIAGNOSIS — L97.522 ULCER OF LEFT FOOT, WITH FAT LAYER EXPOSED (HCC): Primary | ICD-10-CM

## 2022-09-13 DIAGNOSIS — L08.9 FOREIGN BODY IN LEFT FOOT WITH INFECTION, INITIAL ENCOUNTER: ICD-10-CM

## 2022-09-13 DIAGNOSIS — E08.621 DIABETIC ULCER OF MIDFOOT ASSOCIATED WITH DIABETES MELLITUS DUE TO UNDERLYING CONDITION, WITH FAT LAYER EXPOSED, UNSPECIFIED LATERALITY (HCC): ICD-10-CM

## 2022-09-13 DIAGNOSIS — L97.421 ULCER OF LEFT HEEL, LIMITED TO BREAKDOWN OF SKIN (HCC): ICD-10-CM

## 2022-09-13 DIAGNOSIS — L97.402 DIABETIC ULCER OF MIDFOOT ASSOCIATED WITH DIABETES MELLITUS DUE TO UNDERLYING CONDITION, WITH FAT LAYER EXPOSED, UNSPECIFIED LATERALITY (HCC): ICD-10-CM

## 2022-09-13 DIAGNOSIS — S90.852A FOREIGN BODY IN LEFT FOOT WITH INFECTION, INITIAL ENCOUNTER: ICD-10-CM

## 2022-09-13 PROBLEM — L97.502 DIABETIC ULCER OF FOOT WITH FAT LAYER EXPOSED (HCC): Status: ACTIVE | Noted: 2022-09-13

## 2022-09-13 PROBLEM — E11.621 DIABETIC ULCER OF FOOT WITH FAT LAYER EXPOSED (HCC): Status: ACTIVE | Noted: 2022-09-13

## 2022-09-13 PROBLEM — L97.512 RIGHT FOOT ULCER, WITH FAT LAYER EXPOSED (HCC): Status: ACTIVE | Noted: 2022-09-13

## 2022-09-13 PROCEDURE — 11042 DBRDMT SUBQ TIS 1ST 20SQCM/<: CPT

## 2022-09-13 RX ORDER — LIDOCAINE 50 MG/G
OINTMENT TOPICAL ONCE
Status: CANCELLED | OUTPATIENT
Start: 2022-09-13 | End: 2022-09-13

## 2022-09-13 RX ORDER — MUPIROCIN 20 MG/G
OINTMENT TOPICAL ONCE
Status: CANCELLED | OUTPATIENT
Start: 2022-09-13 | End: 2022-09-13

## 2022-09-13 RX ORDER — CLOBETASOL PROPIONATE 0.5 MG/G
OINTMENT TOPICAL ONCE
Status: CANCELLED | OUTPATIENT
Start: 2022-09-13 | End: 2022-09-13

## 2022-09-13 RX ORDER — LIDOCAINE HYDROCHLORIDE 40 MG/ML
SOLUTION TOPICAL ONCE
Status: CANCELLED | OUTPATIENT
Start: 2022-09-13 | End: 2022-09-13

## 2022-09-13 RX ORDER — LIDOCAINE HYDROCHLORIDE 20 MG/ML
JELLY TOPICAL ONCE
Status: CANCELLED | OUTPATIENT
Start: 2022-09-13 | End: 2022-09-13

## 2022-09-13 RX ORDER — LIDOCAINE 40 MG/G
CREAM TOPICAL ONCE
Status: CANCELLED | OUTPATIENT
Start: 2022-09-13 | End: 2022-09-13

## 2022-09-13 RX ORDER — BACITRACIN ZINC AND POLYMYXIN B SULFATE 500; 1000 [USP'U]/G; [USP'U]/G
OINTMENT TOPICAL ONCE
Status: CANCELLED | OUTPATIENT
Start: 2022-09-13 | End: 2022-09-13

## 2022-09-13 RX ORDER — LIDOCAINE HYDROCHLORIDE 20 MG/ML
JELLY TOPICAL ONCE
Status: COMPLETED | OUTPATIENT
Start: 2022-09-13 | End: 2022-09-13

## 2022-09-13 RX ADMIN — LIDOCAINE HYDROCHLORIDE 5 ML: 20 JELLY TOPICAL at 14:00

## 2022-09-14 NOTE — PROGRESS NOTES
[facility]  Initial Consult note    Subjective:     Chief Complaint:  Farhan Vidales is a 49 y.o.  female who presents with Rt. foot medial, hallux, Lt. foot left, hallux, medial heel wound of several weeks duration.     Referred by:  Dr. Hien Arriola    HPI:   Diabetic with long hx of multiple pre ulcerative lesions with right hallux infected blister resulting in ER visit with bedside debridement by Dr. Arnie Landin caused by: none  Current wound care:  Offloading wound: no  Appetite: good  Wound associated pain: mild  Diabetic: yes uncontrolled  Smoker: no  ROS: no N/V, no T/chills; no local rash  Past Medical History:   Diagnosis Date    Aneurysm ([facility] [address]     brain (coiled)    Anxiety     Arthritis     generalized    Asthma     CAD (coronary artery disease) 2017    heart stents    COPD     Depression     depression and anxiety    Diabetes ([facility] [address]     type 2    GERD (gastroesophageal reflux disease)     well controlled with meds    Hypertension     Ill-defined condition     Uses 02 at night 2L/min, and as needed in daytime    Other ill-defined conditions(799.89)     fibromyalgia    Stroke ([facility] [address]       Past Surgical History:   Procedure Laterality Date    HX  SECTION      x 3    HX CHOLECYSTECTOMY      HX GYN      hysterectomy    HX HERNIA REPAIR  2013    abdominal     HX OTHER SURGICAL  2004    Brain surgery shunt, coiling    FL CARDIAC SURG PROCEDURE UNLIST  2017    2 stents     Family History   Problem Relation Age of Onset    Malignant Hyperthermia Neg Hx     Pseudocholinesterase Deficiency Neg Hx     Delayed Awakening Neg Hx     Post-op Nausea/Vomiting Neg Hx     Post-op Cognitive Dysfunction Neg Hx     Emergence Delirium Neg Hx       Social History     Tobacco Use    Smoking status: Every Day     Packs/day: 0.25     Years: 20.00     Pack years: 5.00     Types: Cigarettes    Smokeless tobacco: Never    Tobacco comments:     advised no smoking 24 h pre-op   Substance Use Topics    Alcohol use: No       Prior to Admission medications    Medication Sig Start Date End Date Taking? Authorizing Provider   cephALEXin (Keflex) 500 mg capsule Take 1 Capsule by mouth three (3) times daily. 8/22/22   Sade Mancuso MD   tiotropium (SPIRIVA WITH HANDIHALER) 18 mcg inhalation capsule Take 1 Cap by inhalation daily. Provider, Historical   albuterol (PROVENTIL HFA, VENTOLIN HFA, PROAIR HFA) 90 mcg/actuation inhaler Take  by inhalation every four (4) hours as needed for Wheezing. Provider, Historical   zolpidem (AMBIEN) 10 mg tablet Take  by mouth nightly. Provider, Historical   ALPRAZolam (XANAX) 1 mg tablet Take 1 mg by mouth daily as needed. Indications: anxiety    Provider, Historical   furosemide (LASIX) 20 mg tablet Take 20 mg by mouth daily. Indications: Edema    Provider, Historical   pregabalin (LYRICA) 75 mg capsule Take 150 mg by mouth three (3) times daily. Provider, Historical   esomeprazole (NEXIUM) 40 mg capsule Take  by mouth daily. Instructed to take with sip of water DOS    Provider, Historical   albuterol-ipratropium (DUO-NEB) 2.5 mg-0.5 mg/3 ml nebulizer solution 3 mL by Nebulization route every four (4) hours. Indications: CHRONIC OBSTRUCTIVE PULMONARY DISEASE WITH BRONCHOSPASMS    Provider, Historical   roflumilast (DALIRESP) Tab tablet Take 500 mcg by mouth daily. Indications: COPD ASSOCIATED WITH CHRONIC BRONCHITIS    Provider, Historical   DULoxetine (CYMBALTA) 60 mg capsule Take 120 mg by mouth daily. Indications: ANXIETY WITH DEPRESSION    Provider, Historical     Allergies   Allergen Reactions    Sulfa (Sulfonamide Antibiotics) Hives, Rash and Itching        Review of Systems  Gen: No fever, chills, malaise, weight loss/gain. Derm: see above   Musc/skeletal: no bone or joint complains. Vasc: No edema, cyanosis or claudication. Endo: No heat/cold intolerance, no polyuria,polydipsia or polyphagia.      HgbA1c:  Advance Care plan:     Counseling re nutrition yes done. Current meds documented in chart  Pain:     Counseling re smoking cessation not done. Blood pressure: noted below     Review of Systems:  Pertinent items are noted in the History of Present Illness. Objective:     Physical Exam:     There were no vitals taken for this visit. General: well developed, well nourished, pleasant , NAD. Hygiene good  Psych: cooperative. Pleasant. No anxiety or depression. Normal mood and affect. Neuro: alert and oriented to person/place/situation. Otherwise nonfocal.  Derm: Skin turgor for age, dry skin  Lower extremities: color normal; temperature normal.Hair growth is not present. Calves are supple, nontender, approximately equally sized in comparison. Capillary refill <3 sec  Focussed Lower Extremity Exam:  Vascular exam & Nails dystrophic:  Bilateral lower extremity: mild  edema, foot mild,   DP pulse : DP, 2+  PT pulse: Bilateral, 1+    Ulcer Location: Rt. foot hallux, Lt. foot left, hallux   Etiology: neuropathic  Wound Length:    Wound Width :   Wound Depth :   Ulcer bed: Granular/Healthy  Pink Granulation  Large HK without acute infection   Periwound: Calloused  Exudate: None: wound tissue dry  Depth of Wound: To Fat Layer     Data Review:   No results found for this or any previous visit (from the past 24 hour(s)).       Assessment:     Patient Active Problem List   Diagnosis Code    Other and unspecified noninfectious gastroenteritis and colitis(558.9) K52.9    Esophageal reflux K21.9    Diabetes mellitus, insulin dependent (IDDM), uncontrolled [id number]    HTN (hypertension) I10    Dehydration E86.0    COPD exacerbation (HCC) J44.1    Incarcerated hernia K46.0    Type II or unspecified type diabetes mellitus without mention of complication, not stated as uncontrolled E11.9    Cellulitis L03.90    Right foot ulcer, with fat layer exposed ([address] L97.512    Ulcer of left foot, with fat layer exposed ([address] L97.522    Ulcer of left heel, limited to breakdown of skin ([address] L97.421    Foreign body in left foot with infection S90.852A, L08.9    Diabetic ulcer of foot with fat layer exposed ([address] E11.621, L97.502     49 y.o. female with chronic neuropathic wounds non infected     Needs :  Serial debridement- debrided today- see note below  Good local wound care  Edema management  Nutrition optimization  Good Diabetic control    Plan:     In wound care clinic today:  Cleanse wound with NS or soap and water or commercial wound cleanser  Apply the following topically to wound bed:  Apply the following to ashia-wound: NA  Apply the following dressings: Absorptive dressing    For Home Care/Self Care:  Cleanse wound with NS or soap and water or commercial wound cleanser  Keep dressing dry and intact when bathing  Apply the following to wound bed:  Apply the following to skin around wound: NA  Apply the following dressings: Absorptive dressing    Leave dressings in place until next visit    Edema Control:   Elevate legs as much as possible. Avoid standing in one position for more than 10 minutes. Avoid setting with legs down. Do not cross legs while sitting. Off-Loading:     Frequent position changes. Do not cross legs while sitting. Shift weight every 20 minutes or more when sitting for prolonged periods of time. Patient to return for wound care in:   Days  Follow up with Nurse visit as recommended. PLEASE CONTACT OFFICE AS SOON AS POSSIBLE IF UNABLE TO MAKE THIS APPOINTMENT. Inspect your wounds, looking for signs of infection which may include the following:  Increase in redness  Red \"streaks\" from wound  Increase in swelling  Fever  Unusual odor  Change in the amount of wound drainage. Should you experience any significant changes in your wound(s) or have any questions regarding your home care instructions please contact the wound center or your home health company.  If after regular business hours, please call your family doctor or local emergency room.    Patient understood and agrees with plan. Questions answered. Signed By: Chen Bob DPM     September 14, 2022         Debridement Wound Care        Problem List Items Addressed This Visit          Endocrine    Diabetic ulcer of foot with fat layer exposed ([address]    Relevant Medications    lidocaine (XYLOCAINE) 2 % jelly (Completed)    Other Relevant Orders    INITIATE OUTPATIENT WOUND CARE PROTOCOL       Integumentary    Ulcer of left foot, with fat layer exposed ([address] - Primary    Relevant Medications    lidocaine (XYLOCAINE) 2 % jelly (Completed)    Other Relevant Orders    INITIATE OUTPATIENT WOUND CARE PROTOCOL    Ulcer of left heel, limited to breakdown of skin (HCC)    Relevant Medications    lidocaine (XYLOCAINE) 2 % jelly (Completed)    Other Relevant Orders    INITIATE OUTPATIENT WOUND CARE PROTOCOL       Other    Foreign body in left foot with infection    Relevant Medications    lidocaine (XYLOCAINE) 2 % jelly (Completed)    Other Relevant Orders    INITIATE OUTPATIENT WOUND CARE PROTOCOL       Medications Ordered Today   Medications    lidocaine (XYLOCAINE) 2 % jelly        Procedure Note  Indications:  Based on my examination of this patient's wound(s)/ulcer(s) today, debridement is required to promote healing and evaluate the wound base.     Performed by: Chen Bob DPM    Consent obtained: Yes    Time out taken: Yes    Debridement: Excisional    Using  #15 blade the wound(s)/ulcer(s) was/were sharply debrided down through and including the removal of    epidermis, dermis, and subcutaneous tissue    Devitalized Tissue Debrided: biofilm and callus    Pre Debridement Measurements:  Are located in the Wound/Ulcer Documentation Flow Sheet    Wound/Ulcer #: 1, 2, 3, and 4    Post Debridement Measurements:  Wound/Ulcer Descriptions are Pre Debridement except measurements: Diabetic ulcer, fat layer exposed          Percent of Wound(s)/Ulcer(s) Debrided: 100%    Total Surface Area Debrided:  Approx 46 sq cm See RN's note    Diabetic/Pressure/Non Pressure Ulcers only: Diabetic ulcer, fat layer exposed  Ulcer:     Estimated Blood Loss:  Minimal     Hemostasis Achieved: Pressure    Procedural Pain: 4 / 10     Post Procedural Pain: 0 / 10     Response to treatment: Patient tolerated treatment with mild discomfort but relieved after procedure was completed

## 2022-09-15 VITALS
SYSTOLIC BLOOD PRESSURE: 137 MMHG | DIASTOLIC BLOOD PRESSURE: 64 MMHG | HEART RATE: 92 BPM | OXYGEN SATURATION: 100 % | TEMPERATURE: 98.8 F | RESPIRATION RATE: 18 BRPM

## 2022-09-15 NOTE — WOUND CARE
09/13/22 1420   Wound Toe (Comment  which one) Left left great toe posterior wound dry. 09/13/22   Date First Assessed/Time First Assessed: 09/13/22 1411   Wound Approximate Age at First Assessment (Weeks): 8 weeks  Primary Wound Type: Diabetic Ulcer  Location: Toe (Comment  which one)  Wound Location Orientation: Left  Wound Description: left grea. .. Wound Image     Wound Etiology Diabetic   Dressing Status Intact; Old drainage noted   Cleansed Wound cleanser   Dressing/Treatment Alginate with Ag;Collagen with Ag;Roll gauze   Offloading for Diabetic Foot Ulcers Diabetic shoes/inserts   Dressing Change Due 09/20/22   Wound Length (cm) 0.8 cm   Wound Width (cm) 0.6 cm   Wound Depth (cm) 0.3 cm   Wound Surface Area (cm^2) 0.48 cm^2   Wound Volume (cm^3) 0.144 cm^3   Post-Procedure Length (cm) 1.1 cm   Post-Procedure Width (cm) 0.8 cm   Post-Procedure Depth (cm) 0.5 cm   Post-Procedure Surface Area (cm^2) 0.88 cm^2   Post-Procedure Volume (cm^3) 0.44 cm^3   Wound Assessment Dry;Dusky;Fibrin   Drainage Amount Small   Drainage Description Serosanguinous   Wound Odor Mild   Janis-Wound/Incision Assessment Dry/flaky; Hyperkeratosis (Callous)   Edges Undefined edges; Unattached edges   Wound Thickness Description Full thickness   Wound Toe (Comment  which one) Right multiple dry areas, oozing 09/13/22   Date First Assessed/Time First Assessed: 09/13/22 1410   Present on Hospital Admission: Yes  Wound Approximate Age at First Assessment (Weeks): 8 weeks  Primary Wound Type: Diabetic Ulcer  Location: Toe (Comment  which one)  Wound Location Orientation. ..    Wound Image       Wound Etiology Diabetic   Dressing Status Intact;Breakthrough drainage noted   Cleansed Wound cleanser   Dressing/Treatment Alginate with Ag;Collagen with Ag;Gauze dressing/dressing sponge;Roll gauze  (2 layer compression wrap)   Offloading for Diabetic Foot Ulcers Diabetic shoes/inserts   Dressing Change Due 09/20/22   Wound Length (cm) 5 cm   Wound Width (cm) 7 cm   Wound Depth (cm) 0.2 cm   Wound Surface Area (cm^2) 35 cm^2   Wound Volume (cm^3) 7 cm^3   Post-Procedure Length (cm) 5.2 cm   Post-Procedure Width (cm) 7.2 cm   Post-Procedure Depth (cm) 0.3 cm   Post-Procedure Surface Area (cm^2) 37.44 cm^2   Post-Procedure Volume (cm^3) 11.232 cm^3   Wound Assessment Denuded;Devitalized tissue   Drainage Amount Small   Drainage Description Serosanguinous   Wound Odor Mild   Janis-Wound/Incision Assessment Blanchable erythema   Edges Unattached edges; Undefined edges   Wound Thickness Description Full thickness   Wound Left;Plantar small punctur wound 09/13/22   Date First Assessed/Time First Assessed: 09/13/22 1430   Present on Hospital Admission: Yes  Wound Approximate Age at First Assessment (Weeks): 6 weeks  Primary Wound Type: Diabetic Ulcer  Wound Location Orientation: Left;Plantar  Wound Description: s. .. Wound Image    Wound Etiology Diabetic   Dressing Status Intact;Breakthrough drainage noted   Cleansed Wound cleanser   Dressing/Treatment Alginate with Ag;Collagen with Ag;Roll gauze   Offloading for Diabetic Foot Ulcers Diabetic shoes/inserts   Dressing Change Due 09/20/22   Wound Length (cm) 0.2 cm   Wound Width (cm) 0.2 cm   Wound Depth (cm) 0.2 cm   Wound Surface Area (cm^2) 0.04 cm^2   Wound Volume (cm^3) 0.008 cm^3   Post-Procedure Length (cm) 0.3 cm   Post-Procedure Width (cm) 0.2 cm   Post-Procedure Depth (cm) 0.3 cm   Post-Procedure Surface Area (cm^2) 0.06 cm^2   Post-Procedure Volume (cm^3) 0.018 cm^3   Wound Assessment Fibrin;Pale granulation tissue; Devitalized tissue   Drainage Amount Small   Drainage Description Serosanguinous   Wound Odor Mild   Janis-Wound/Incision Assessment Blanchable erythema;Dry/flaky; Hyperkeratosis (Callous)   Edges Defined edges   Wound Thickness Description Full thickness

## 2022-09-15 NOTE — WOUND CARE
Multilayer Compression Wrap   (Not Unna) Below the Knee    NAME:  [address]:  1965  MEDICAL RECORD NUMBER:  [id number]  DATE:  9/13/2022    Removed old Multilayer wrap if indicated and wash leg with mild soap/water. Applied moisturizing agent to dry skin as needed. Applied primary and secondary dressing as ordered. Applied multilayered dressing below the knee to right lower leg. Applied multilayered dressing below the knee to left lower leg. Instructed patient/caregiver not to remove dressing and to keep it clean and dry. Instructed patient/caregiver on complications to report to provider, such as pain, numbness in toes, heavy drainage, and slippage of dressing. Instructed patient on purpose of compression dressing and on activity and exercise recommendations.     Response to treatment: Patient tolerated treatment with mild discomfort but relieved after procedure was completed      Electronically signed by Lopez Collado LPN on 4/07/9600 at 2:96 PM

## 2022-09-15 NOTE — WOUND CARE
Discharge Instructions from  [address] [facility]  [address] [place], [address]  [phone] Fax [phone]    NAME:  Ivania Reyes  YOB: 1965  MEDICAL RECORD NUMBER:  [id number]  DATE:  9/13/2022    Wound Cleansing:   Do not scrub or use excessive force. Cleanse wound prior to applying a clean dressing with:  [] Normal Saline [] Keep Wound Dry in Shower    [x] Wound Cleanser   [] Cleanse wound with Mild Soap & Water  [] May Shower at Discharge   [] Other:      Topical Treatments:  Do not apply lotions, creams, or ointments to wound bed unless directed. [x] Apply moisturizing lotion to skin surrounding  prior to dressing change.  [] Apply antifungal ointment to skin surrounding the wound prior to dressing change. [x] Apply thin film of moisture barrier ointment to skin immediately around wound. [] Other:       Dressings:           Wound Location Bilateral lower legs Unna boots  [x] Apply Primary Dressing:       [] MediHoney Gel [x] Alginate with Silver [] Alginate   [] Collagen [x] Collagen with Silver   [] Santyl with Moisten saline gauze     [] Hydrocolloid   [] MediHoney Alginate [] Foam with Silver   [] Foam   [] Hydrofera Blue    [] Mepilex Border    [] Moisten with Saline [] Hydrogel [] Mepitel     [] Bactroban/Mupirocin [] Polysporin  [] Other:    [x] Cover and Secure with:     [x] Gauze [x] Mandy [] Kerlix   [] Ace Wrap [] Cover Roll Tape [] ABD     [x] Other: unna boot and coban   Avoid contact of tape with skin. [x] Change dressing: [] Daily    [] Every Other Day [] Three times per week   [x] Once a week [] Do Not Change Dressing   [] Other:    Pressure Relief:  [x] When sitting, shift position or do seat lifts every 15 minutes.  [] Wheelchair cushion [x] Specialty Bed/Mattress  [] Turn every 2 hours when in bed. Avoid position directing pressure on wound site. Limit side lying to 30 degree tilt.   Limit HOB elevation to 30 degrees. Edema Control:  Apply: [] Compression Stocking []Right Leg []Left Leg   [] Tubigrip []Right Leg Double Layer []Left Leg Double Layer       []Right Leg Single Layer []Left Leg Single Layer   [] SpandaGrip []Right Leg  []Left Leg      []Low compression 5-10 mm/Hg      []Medium compression 10-20 mm/Hg     []High compression  20-30 mm/Hg   every morning immediately when getting up should be applied to affected leg(s) from mid foot to knee making sure to cover the heel. Remove every night before going to bed. [x] Elevate leg(s) above the level of the heart when sitting. [x] Avoid prolonged standing in one place. Compression:  Apply: [x] Multilayer Compression Wrap Applied in Clinic [x]RightLeg [x]Left Leg   [] Multi-layer compression. Do not get leg(s) with wrap wet. If wraps become too tight call the center or completely remove the wrap. [x] Elevate leg(s) above the level of the heart when sitting. [x] Avoid prolonged standing in one place. Off-Loading:   [] Off-loading when [] walking  [] in bed [] sitting  [] Total non-weight bearing  [] Right Leg  [] Left Leg   [] Assistive Device [] Walker [] Cane  [] Wheelchair  [] Crutches   [] Surgical shoe    [] Podus Boot(s)   [] Foam Boot(s)  [] Roll About    [] Cast Boot [] CROW Boot  [] Other:    Contact Cast:  Apply: [] Total Contact Cast Applied in Clinic []RightLeg []Left Leg   [] Do not get cast wet. Contact center or go to emergency room if there is a foul odor or becomes uncomfortable due to feeling tight or swelling. Do not use objects inside of cast to scratch.       Dietary:  [x] Diet as tolerated: [] Calorie Diabetic Diet: [] No Added Salt:  [x] Increase Protein: [] Other:   Activity:  [x] Activity as tolerated:  [x] Patient has no activity restrictions     [] Strict Bedrest: [] Remain off Work:     [] May return to full duty work:                                   [] Return to work with restrictions:             Return Appointment:  [x] Wound and dressing supply provider:   [] ECF or Home Healthcare:  [x] Wound Assessment: [x] Physician or NP scheduled for Wound Assessment:   [x] Return Appointment:1 Week(s)  [] Ordered tests:      Electronically signed Kenneth Landaverde LPN on 8/44/3598 at 6:68 PM    Verbal order per. Dr. Anastasia Jordan Information: Should you experience any significant changes in your wound(s) or have questions about your wound care, please contact the [facility] at [address] [place] [address] 8:00 am - 4:30. If you need help with your wound outside these hours and cannot wait until we are again available, contact your PCP or go to the hospital emergency room. PLEASE NOTE: IF YOU ARE UNABLE TO OBTAIN WOUND SUPPLIES, CONTINUE TO USE THE SUPPLIES YOU HAVE AVAILABLE UNTIL YOU ARE ABLE TO REACH US. IT IS MOST IMPORTANT TO KEEP THE WOUND COVERED AT ALL TIMES.      Physician Signature:_______________________    Date: ___________ Time:  ____________

## 2022-09-16 NOTE — WOUND CARE
Compression [address] [facility], [address] [place] [address]  p: [phone] f: [phone]    Ordering Center:     [facility]  [address] [facility] [address] [phone]  WOUND CARE [unfilled]   FAX NUMBER [unfilled]    Patient Information:      Lyndsay Tatum  [address] [id number]   [unfilled]   : 1965  AGE: 49 y.o. GENDER: female   TODAYS DATE:  2022    Insurance:      PRIMARY INSURANCE:  [id number] - (Medicaid)    Payer/Plan Subscr  Sex Relation Sub. Ins. ID Effective Group Num   1. [facility]* LUCINDA NICK 1965 Female Self [id number]                                    [address] [address]   2. [employer]* LUCINDA NICK 1965 Female Self [id number] [id number]                                   [address] [address]       Patient Wound Information:      Problem List Items Addressed This Visit          Endocrine    Diabetic ulcer of foot with fat layer exposed ([address]       Integumentary    Ulcer of left foot, with fat layer exposed ([address] - Primary    Ulcer of left heel, limited to breakdown of skin ([address]       Other    Foreign body in left foot with infection       WOUNDS REQUIRING DRESSING SUPPLIES:     Wound Toe (Comment  which one) Left left great toe posterior wound dry. 22 (Active)   Wound Image    22 1420   Wound Etiology Venous 22 1420   Dressing Status Intact; Old drainage noted 22 1420   Cleansed Wound cleanser 22 1420   Dressing/Treatment Alginate with Ag;Collagen with Ag;Roll gauze 22 142   Offloading for Diabetic Foot Ulcers Diabetic shoes/inserts 22 1420   Dressing Change Due 22 1420   Wound Length (cm) 0.8 cm 22 1420   Wound Width (cm) 0.6 cm 22 1420   Wound Depth (cm) 0.3 cm 22 1420   Wound Surface Area (cm^2) 0.48 cm^2 22 1420   Wound Volume (cm^3) 0.144 cm^3 09/13/22 1420   Post-Procedure Length (cm) 1.1 cm 09/13/22 1420   Post-Procedure Width (cm) 0.8 cm 09/13/22 1420   Post-Procedure Depth (cm) 0.5 cm 09/13/22 1420   Post-Procedure Surface Area (cm^2) 0.88 cm^2 09/13/22 1420   Post-Procedure Volume (cm^3) 0.44 cm^3 09/13/22 1420   Wound Assessment Dry;Dusky;Fibrin 09/13/22 1420   Drainage Amount Small 09/13/22 1420   Drainage Description Serosanguinous 09/13/22 1420   Wound Odor Mild 09/13/22 1420   Janis-Wound/Incision Assessment Dry/flaky; Hyperkeratosis (Callous) 09/13/22 1420   Edges Undefined edges; Unattached edges 09/13/22 1420   Wound Thickness Description Full thickness 09/13/22 1420   Number of days: 3       Wound Toe (Comment  which one) Right multiple dry areas, oozing 09/13/22 (Active)   Wound Image      09/13/22 1420   Wound Etiology Venous 09/13/22 1420   Dressing Status Intact;Breakthrough drainage noted 09/13/22 1420   Cleansed Wound cleanser 09/13/22 1420   Dressing/Treatment Alginate with Ag;Collagen with Ag;Gauze dressing/dressing sponge;Roll gauze 09/13/22 1420   Offloading for Diabetic Foot Ulcers Diabetic shoes/inserts 09/13/22 1420   Dressing Change Due 09/20/22 09/13/22 1420   Wound Length (cm) 5 cm 09/13/22 1420   Wound Width (cm) 7 cm 09/13/22 1420   Wound Depth (cm) 0.2 cm 09/13/22 1420   Wound Surface Area (cm^2) 35 cm^2 09/13/22 1420   Wound Volume (cm^3) 7 cm^3 09/13/22 1420   Post-Procedure Length (cm) 5.2 cm 09/13/22 1420   Post-Procedure Width (cm) 7.2 cm 09/13/22 1420   Post-Procedure Depth (cm) 0.3 cm 09/13/22 1420   Post-Procedure Surface Area (cm^2) 37.44 cm^2 09/13/22 1420   Post-Procedure Volume (cm^3) 11.232 cm^3 09/13/22 1420   Wound Assessment Denuded;Devitalized tissue 09/13/22 1420   Drainage Amount Small 09/13/22 1420   Drainage Description Serosanguinous 09/13/22 1420   Wound Odor Mild 09/13/22 1420   Janis-Wound/Incision Assessment Blanchable erythema 09/13/22 1420   Edges Unattached edges; Undefined edges 09/13/22 1420 Wound Thickness Description Full thickness 09/13/22 1420   Number of days: 3       Wound Left;Plantar small punctur wound 09/13/22 (Active)   Wound Image   09/13/22 1420   Wound Etiology Venous 09/13/22 1420   Dressing Status Intact;Breakthrough drainage noted 09/13/22 1420   Cleansed Wound cleanser 09/13/22 1420   Dressing/Treatment Alginate with Ag;Collagen with Ag;Roll gauze 09/13/22 1420   Offloading for Diabetic Foot Ulcers Diabetic shoes/inserts 09/13/22 1420   Dressing Change Due 09/20/22 09/13/22 1420   Wound Length (cm) 0.2 cm 09/13/22 1420   Wound Width (cm) 0.2 cm 09/13/22 1420   Wound Depth (cm) 0.2 cm 09/13/22 1420   Wound Surface Area (cm^2) 0.04 cm^2 09/13/22 1420   Wound Volume (cm^3) 0.008 cm^3 09/13/22 1420   Post-Procedure Length (cm) 0.3 cm 09/13/22 1420   Post-Procedure Width (cm) 0.2 cm 09/13/22 1420   Post-Procedure Depth (cm) 0.3 cm 09/13/22 1420   Post-Procedure Surface Area (cm^2) 0.06 cm^2 09/13/22 1420   Post-Procedure Volume (cm^3) 0.018 cm^3 09/13/22 1420   Wound Assessment Fibrin;Pale granulation tissue; Devitalized tissue 09/13/22 1420   Drainage Amount Small 09/13/22 1420   Drainage Description Serosanguinous 09/13/22 1420   Wound Odor Mild 09/13/22 1420   Janis-Wound/Incision Assessment Blanchable erythema;Dry/flaky; Hyperkeratosis (Callous) 09/13/22 1420   Edges Defined edges 09/13/22 1420   Wound Thickness Description Full thickness 09/13/22 1420   Number of days: 3        Right Leg Measurements: (ALL measurements are in cm)   (L)43 (C) 37 (A) 27    Left Leg Measurements: (ALL measurements are in cm)    (L) 43 (C) 37 (A) 27    Supplies Requested :     Medicare Requirements  Patient must have a qualifying Active Venus Ulcer if ordering Bilateral Compression Wounds MUST be present on both legs for Medicare Coverage. The patient can Not be on home health or have had a Medicare part A stay in the past 24 hours.     Patient Wound(s) Debrided: [x] Yes if yes please add date 09/13/2022 [] No    Debribement Type: Excisional/Sharp    Patient currently being seen by Home Health: [x] Yes   [] No     Compression Type: Circaid Juxtalite, Original, 30-40 mm/Hg, BILATERAL lower legs     Provider Information:      PROVIDER'S NAMECaro Randall DPM    NPI: [id number]

## 2022-09-16 NOTE — DISCHARGE INSTRUCTIONS
Discharge Instructions from  [address] [facility]  [address] [place], [place], [facility]  [phone] Fax [phone]    NAME:  Tay [address]:  1965  MEDICAL RECORD NUMBER:  [id number]  DATE:  @ED@    Wound Cleansing:   Do not scrub or use excessive force. Cleanse wound prior to applying a clean dressing with:  [] Normal Saline [] Keep Wound Dry in Shower    [x] Wound Cleanser   [] Cleanse wound with Mild Soap & Water  [] May Shower at Discharge   [] Other:      Topical Treatments:  Do not apply lotions, creams, or ointments to wound bed unless directed. [x] Apply moisturizing lotion to skin surrounding the wound prior to dressing change. [x] Apply antifungal ointment to skin surrounding the wound prior to dressing change. [x] Apply thin film of moisture barrier ointment to skin immediately around wound. [] Other:       Dressings:           Wound Location Bilateral lower legs   [x] Apply Primary Dressing:       [] MediHoney Gel [x] Alginate with Silver [] Alginate   [] Collagen [x] Collagen with Silver   [] Santyl with Moisten saline gauze     [] Hydrocolloid   [] MediHoney Alginate [] Foam with Silver   [] Foam   [] Hydrofera Blue    [] Mepilex Border    [] Moisten with Saline [] Hydrogel [] Mepitel     [] Bactroban/Mupirocin [] Polysporin  [x] Other: 2 Layer compression wrap  [x] Cover and Secure with:     [x] Gauze [x] Mandy [] Kerlix   [] Ace Wrap [] Cover Roll Tape [] ABD     [x] Other:    Avoid contact of tape with skin.   [x] Change dressing: [] Daily    [] Every Other Day [] Three times per week   [] Once a week [] Do Not Change Dressing   [] Other:       Edema Control:  Apply: [] Compression Stocking []Right Leg []Left Leg   [] Tubigrip []Right Leg Double Layer []Left Leg Double Layer       []Right Leg Single Layer []Left Leg Single Layer   [] SpandaGrip []Right Leg  []Left Leg      []Low compression 5-10 mm/Hg      []Medium compression 10-20 mm/Hg     []High compression  20-30 mm/Hg   every morning immediately when getting up should be applied to affected leg(s) from mid foot to knee making sure to cover the heel. Remove every night before going to bed. [x] Elevate leg(s) above the level of the heart when sitting. [x] Avoid prolonged standing in one place. Compression:  Apply: [x] Multilayer Compression Wrap Applied in Clinic [x]RightLeg [x]Left Leg   [] Multi-layer compression. Do not get leg(s) with wrap wet. If wraps become too tight call the center or completely remove the wrap. [x] Elevate leg(s) above the level of the heart when sitting. [x] Avoid prolonged standing in one place. Off-Loading:   [] Off-loading when [x] walking  [x] in bed [x] sitting  [] Total non-weight bearing  [] Right Leg  [] Left Leg   [x] Assistive Device [x] Walker [] Cane  [] Wheelchair  [] Crutches   [] Surgical shoe    [] Podus Boot(s)   [] Foam Boot(s)  [] Roll About    [] Cast Boot [] CROW Boot  [] Other:      Dietary:  [] Diet as tolerated: [x] Calorie Diabetic Diet: [] No Added Salt:  [x] Increase Protein: [] Other:   Activity:  [x] Activity as tolerated:  [] Patient has no activity restrictions     [] Strict Bedrest: [] Remain off Work:     [] May return to full duty work:                                   [] Return to work with restrictions:             Return Appointment:  [x] Wound and dressing supply provider:   [x] ECF or Home Healthcare:  [x] Wound Assessment: [x] Physician or NP scheduled for Wound Assessment:   [x] Return Appointment: 1Week(s)  [] Ordered tests:      Electronically signed Natalya Saucedo LPN on 3/27/8792 at 98:19 AM     [address]ema Fel[address]: Should you experience any significant changes in your wound(s) or have questions about your wound care, please contact the [address] at [address] [place] [address] 8:00 am - 4:30.   If you need help with your wound outside these hours and cannot wait until we are again available, contact your PCP or go to the hospital emergency room. PLEASE NOTE: IF YOU ARE UNABLE TO OBTAIN WOUND SUPPLIES, CONTINUE TO USE THE SUPPLIES YOU HAVE AVAILABLE UNTIL YOU ARE ABLE TO REACH US. IT IS MOST IMPORTANT TO KEEP THE WOUND COVERED AT ALL TIMES.      Physician Signature:_______________________    Date: ___________ Time:  ____________

## 2022-09-20 ENCOUNTER — HOSPITAL ENCOUNTER (OUTPATIENT)
Dept: WOUND CARE | Age: 57
Discharge: HOME | End: 2022-09-20
Attending: PODIATRIST
Payer: MEDICARE

## 2022-09-20 VITALS
OXYGEN SATURATION: 100 % | HEART RATE: 83 BPM | RESPIRATION RATE: 18 BRPM | TEMPERATURE: 98.4 F | DIASTOLIC BLOOD PRESSURE: 83 MMHG | SYSTOLIC BLOOD PRESSURE: 141 MMHG

## 2022-09-20 DIAGNOSIS — L97.522 ULCER OF LEFT FOOT, WITH FAT LAYER EXPOSED (HCC): Primary | ICD-10-CM

## 2022-09-20 DIAGNOSIS — L97.402 DIABETIC ULCER OF MIDFOOT ASSOCIATED WITH DIABETES MELLITUS DUE TO UNDERLYING CONDITION, WITH FAT LAYER EXPOSED, UNSPECIFIED LATERALITY (HCC): ICD-10-CM

## 2022-09-20 DIAGNOSIS — L97.421 ULCER OF LEFT HEEL, LIMITED TO BREAKDOWN OF SKIN (HCC): ICD-10-CM

## 2022-09-20 DIAGNOSIS — S90.852A FOREIGN BODY IN LEFT FOOT WITH INFECTION, INITIAL ENCOUNTER: ICD-10-CM

## 2022-09-20 DIAGNOSIS — L08.9 FOREIGN BODY IN LEFT FOOT WITH INFECTION, INITIAL ENCOUNTER: ICD-10-CM

## 2022-09-20 DIAGNOSIS — E08.621 DIABETIC ULCER OF MIDFOOT ASSOCIATED WITH DIABETES MELLITUS DUE TO UNDERLYING CONDITION, WITH FAT LAYER EXPOSED, UNSPECIFIED LATERALITY (HCC): ICD-10-CM

## 2022-09-20 PROCEDURE — 11042 DBRDMT SUBQ TIS 1ST 20SQCM/<: CPT

## 2022-09-20 RX ORDER — LIDOCAINE 40 MG/G
CREAM TOPICAL ONCE
Status: CANCELLED | OUTPATIENT
Start: 2022-09-20 | End: 2022-09-20

## 2022-09-20 RX ORDER — MUPIROCIN 20 MG/G
OINTMENT TOPICAL ONCE
Status: CANCELLED | OUTPATIENT
Start: 2022-09-20 | End: 2022-09-20

## 2022-09-20 RX ORDER — LIDOCAINE HYDROCHLORIDE 40 MG/ML
SOLUTION TOPICAL ONCE
Status: CANCELLED | OUTPATIENT
Start: 2022-09-20 | End: 2022-09-20

## 2022-09-20 RX ORDER — CLOBETASOL PROPIONATE 0.5 MG/G
OINTMENT TOPICAL ONCE
Status: CANCELLED | OUTPATIENT
Start: 2022-09-20 | End: 2022-09-20

## 2022-09-20 RX ORDER — BACITRACIN ZINC AND POLYMYXIN B SULFATE 500; 1000 [USP'U]/G; [USP'U]/G
OINTMENT TOPICAL ONCE
Status: CANCELLED | OUTPATIENT
Start: 2022-09-20 | End: 2022-09-20

## 2022-09-20 RX ORDER — LIDOCAINE HYDROCHLORIDE 20 MG/ML
JELLY TOPICAL ONCE
Status: CANCELLED | OUTPATIENT
Start: 2022-09-20 | End: 2022-09-20

## 2022-09-20 RX ORDER — LIDOCAINE HYDROCHLORIDE 20 MG/ML
JELLY TOPICAL ONCE
Status: COMPLETED | OUTPATIENT
Start: 2022-09-20 | End: 2022-09-20

## 2022-09-20 RX ORDER — LIDOCAINE 50 MG/G
OINTMENT TOPICAL ONCE
Status: CANCELLED | OUTPATIENT
Start: 2022-09-20 | End: 2022-09-20

## 2022-09-20 RX ADMIN — LIDOCAINE HYDROCHLORIDE 5 ML: 20 JELLY TOPICAL at 13:00

## 2022-09-20 NOTE — PROGRESS NOTES
Debridement Wound Care        Problem List Items Addressed This Visit    None      No orders of the defined types were placed in this encounter. Procedure Note  Indications:  Based on my examination of this patient's wound(s)/ulcer(s) today, debridement is required to promote healing and evaluate the wound base. Performed by: Heidi Harrington DPM    Consent obtained: Yes    Time out taken: Yes    Debridement: Excisional    Using  #10 blade the wound(s)/ulcer(s) was/were sharply debrided down through and including the removal of    epidermis, dermis, and subcutaneous tissue    Devitalized Tissue Debrided: fibrin, biofilm, and callus    Pre Debridement Measurements:  Are located in the Wound/Ulcer Documentation Flow Sheet    Wound/Ulcer #: 1 and 2    Post Debridement Measurements:  Wound/Ulcer Descriptions are Pre Debridement except measurements: Diabetic ulcer, fat layer exposed    Wound Toe (Comment  which one) Left left great toe posterior wound dry. 09/13/22 (Active)   Wound Image    09/13/22 1420   Wound Etiology Venous 09/13/22 1420   Dressing Status Intact; Old drainage noted 09/13/22 1420   Cleansed Wound cleanser 09/13/22 1420   Dressing/Treatment Alginate with Ag;Collagen with Ag;Roll gauze 09/13/22 1420   Offloading for Diabetic Foot Ulcers Diabetic shoes/inserts 09/13/22 1420   Dressing Change Due 09/20/22 09/13/22 1420   Wound Length (cm) 0.8 cm 09/13/22 1420   Wound Width (cm) 0.6 cm 09/13/22 1420   Wound Depth (cm) 0.3 cm 09/13/22 1420   Wound Surface Area (cm^2) 0.48 cm^2 09/13/22 1420   Wound Volume (cm^3) 0.144 cm^3 09/13/22 1420   Post-Procedure Length (cm) 1.1 cm 09/13/22 1420   Post-Procedure Width (cm) 0.8 cm 09/13/22 1420   Post-Procedure Depth (cm) 0.5 cm 09/13/22 1420   Post-Procedure Surface Area (cm^2) 0.88 cm^2 09/13/22 1420   Post-Procedure Volume (cm^3) 0.44 cm^3 09/13/22 1420   Wound Assessment Dry;Dusky;Fibrin 09/13/22 1420   Drainage Amount Small 09/13/22 1420   Drainage Description Serosanguinous 09/13/22 1420   Wound Odor Mild 09/13/22 1420   Janis-Wound/Incision Assessment Dry/flaky; Hyperkeratosis (Callous) 09/13/22 1420   Edges Undefined edges; Unattached edges 09/13/22 1420   Wound Thickness Description Full thickness 09/13/22 1420   Number of days: 7       Wound Toe (Comment  which one) Right multiple dry areas, oozing 09/13/22 (Active)   Wound Image      09/13/22 1420   Wound Etiology Venous 09/13/22 1420   Dressing Status Intact;Breakthrough drainage noted 09/13/22 1420   Cleansed Wound cleanser 09/13/22 1420   Dressing/Treatment Alginate with Ag;Collagen with Ag;Gauze dressing/dressing sponge;Roll gauze 09/13/22 1420   Offloading for Diabetic Foot Ulcers Diabetic shoes/inserts 09/13/22 1420   Dressing Change Due 09/20/22 09/13/22 1420   Wound Length (cm) 5 cm 09/13/22 1420   Wound Width (cm) 7 cm 09/13/22 1420   Wound Depth (cm) 0.2 cm 09/13/22 1420   Wound Surface Area (cm^2) 35 cm^2 09/13/22 1420   Wound Volume (cm^3) 7 cm^3 09/13/22 1420   Post-Procedure Length (cm) 5.2 cm 09/13/22 1420   Post-Procedure Width (cm) 7.2 cm 09/13/22 1420   Post-Procedure Depth (cm) 0.3 cm 09/13/22 1420   Post-Procedure Surface Area (cm^2) 37.44 cm^2 09/13/22 1420   Post-Procedure Volume (cm^3) 11.232 cm^3 09/13/22 1420   Wound Assessment Denuded;Devitalized tissue 09/13/22 1420   Drainage Amount Small 09/13/22 1420   Drainage Description Serosanguinous 09/13/22 1420   Wound Odor Mild 09/13/22 1420   Janis-Wound/Incision Assessment Blanchable erythema 09/13/22 1420   Edges Unattached edges; Undefined edges 09/13/22 1420   Wound Thickness Description Full thickness 09/13/22 1420   Number of days: 7       Wound Left;Plantar small punctur wound 09/13/22 (Active)   Wound Image   09/13/22 1420   Wound Etiology Venous 09/13/22 1420   Dressing Status Intact;Breakthrough drainage noted 09/13/22 1420   Cleansed Wound cleanser 09/13/22 1420   Dressing/Treatment Alginate with Ag;Collagen with Ag;Roll gauze 09/13/22 1420   Offloading for Diabetic Foot Ulcers Diabetic shoes/inserts 09/13/22 1420   Dressing Change Due 09/20/22 09/13/22 1420   Wound Length (cm) 0.2 cm 09/13/22 1420   Wound Width (cm) 0.2 cm 09/13/22 1420   Wound Depth (cm) 0.2 cm 09/13/22 1420   Wound Surface Area (cm^2) 0.04 cm^2 09/13/22 1420   Wound Volume (cm^3) 0.008 cm^3 09/13/22 1420   Post-Procedure Length (cm) 0.3 cm 09/13/22 1420   Post-Procedure Width (cm) 0.2 cm 09/13/22 1420   Post-Procedure Depth (cm) 0.3 cm 09/13/22 1420   Post-Procedure Surface Area (cm^2) 0.06 cm^2 09/13/22 1420   Post-Procedure Volume (cm^3) 0.018 cm^3 09/13/22 1420   Wound Assessment Fibrin;Pale granulation tissue; Devitalized tissue 09/13/22 1420   Drainage Amount Small 09/13/22 1420   Drainage Description Serosanguinous 09/13/22 1420   Wound Odor Mild 09/13/22 1420   Janis-Wound/Incision Assessment Blanchable erythema;Dry/flaky; Hyperkeratosis (Callous) 09/13/22 1420   Edges Defined edges 09/13/22 1420   Wound Thickness Description Full thickness 09/13/22 1420   Number of days: 7        Percent of Wound(s)/Ulcer(s) Debrided: 100%    Total Surface Area Debrided:  Approx 3 sq cm See RN's note    Diabetic/Pressure/Non Pressure Ulcers only: Diabetic ulcer, fat layer exposed  Ulcer:     Estimated Blood Loss:  Minimal     Hemostasis Achieved: Pressure    Procedural Pain: 0 / 10     Post Procedural Pain: 0 / 10     Response to treatment: Well tolerated by patient

## 2022-09-26 ENCOUNTER — HOSPITAL ENCOUNTER (EMERGENCY)
Age: 57
Discharge: STILL ADMITTED OR ADMINISTRATIVE | End: 2022-09-26

## 2022-09-27 ENCOUNTER — HOSPITAL ENCOUNTER (OUTPATIENT)
Dept: WOUND CARE | Age: 57
Discharge: HOME | End: 2022-09-27
Attending: PODIATRIST
Payer: MEDICARE

## 2022-09-27 DIAGNOSIS — L97.522 ULCER OF LEFT FOOT, WITH FAT LAYER EXPOSED (HCC): Primary | ICD-10-CM

## 2022-09-27 DIAGNOSIS — L08.9 FOREIGN BODY IN LEFT FOOT WITH INFECTION, INITIAL ENCOUNTER: ICD-10-CM

## 2022-09-27 DIAGNOSIS — E08.621 DIABETIC ULCER OF MIDFOOT ASSOCIATED WITH DIABETES MELLITUS DUE TO UNDERLYING CONDITION, WITH FAT LAYER EXPOSED, UNSPECIFIED LATERALITY (HCC): ICD-10-CM

## 2022-09-27 DIAGNOSIS — L97.421 ULCER OF LEFT HEEL, LIMITED TO BREAKDOWN OF SKIN (HCC): ICD-10-CM

## 2022-09-27 DIAGNOSIS — S90.852A FOREIGN BODY IN LEFT FOOT WITH INFECTION, INITIAL ENCOUNTER: ICD-10-CM

## 2022-09-27 DIAGNOSIS — L97.402 DIABETIC ULCER OF MIDFOOT ASSOCIATED WITH DIABETES MELLITUS DUE TO UNDERLYING CONDITION, WITH FAT LAYER EXPOSED, UNSPECIFIED LATERALITY (HCC): ICD-10-CM

## 2022-09-27 PROCEDURE — 11042 DBRDMT SUBQ TIS 1ST 20SQCM/<: CPT

## 2022-09-27 RX ORDER — LIDOCAINE 50 MG/G
OINTMENT TOPICAL ONCE
Status: CANCELLED | OUTPATIENT
Start: 2022-09-27 | End: 2022-09-27

## 2022-09-27 RX ORDER — LIDOCAINE HYDROCHLORIDE 20 MG/ML
JELLY TOPICAL ONCE
Status: CANCELLED | OUTPATIENT
Start: 2022-09-27 | End: 2022-09-27

## 2022-09-27 RX ORDER — BACITRACIN ZINC AND POLYMYXIN B SULFATE 500; 1000 [USP'U]/G; [USP'U]/G
OINTMENT TOPICAL ONCE
Status: CANCELLED | OUTPATIENT
Start: 2022-09-27 | End: 2022-09-27

## 2022-09-27 RX ORDER — MUPIROCIN 20 MG/G
OINTMENT TOPICAL ONCE
Status: CANCELLED | OUTPATIENT
Start: 2022-09-27 | End: 2022-09-27

## 2022-09-27 RX ORDER — CLOBETASOL PROPIONATE 0.5 MG/G
OINTMENT TOPICAL ONCE
Status: CANCELLED | OUTPATIENT
Start: 2022-09-27 | End: 2022-09-27

## 2022-09-27 RX ORDER — LIDOCAINE 40 MG/G
CREAM TOPICAL ONCE
Status: CANCELLED | OUTPATIENT
Start: 2022-09-27 | End: 2022-09-27

## 2022-09-27 RX ORDER — LIDOCAINE HYDROCHLORIDE 40 MG/ML
SOLUTION TOPICAL ONCE
Status: CANCELLED | OUTPATIENT
Start: 2022-09-27 | End: 2022-09-27

## 2022-09-27 NOTE — PROGRESS NOTES
Debridement Wound Care        Problem List Items Addressed This Visit    None      No orders of the defined types were placed in this encounter. Procedure Note  Indications:  Based on my examination of this patient's wound(s)/ulcer(s) today, debridement is required to promote healing and evaluate the wound base. Performed by: Sandy López DPM    Consent obtained: Yes    Time out taken: Yes    Debridement: Excisional    Using  #10 blade the wound(s)/ulcer(s) was/were sharply debrided down through and including the removal of    epidermis, dermis, and subcutaneous tissue    Devitalized Tissue Debrided: fibrin, biofilm, and callus    Pre Debridement Measurements:  Are located in the Wound/Ulcer Documentation Flow Sheet    Wound/Ulcer #: 1 and 2    Post Debridement Measurements:  Wound/Ulcer Descriptions are Pre Debridement except measurements: Diabetic ulcer, fat layer exposed    Wound Toe (Comment  which one) Left left great toe posterior wound dry. 09/13/22 (Active)   Wound Image   09/20/22 1509   Wound Etiology Venous 09/20/22 1509   Dressing Status Intact; Old drainage noted 09/20/22 1509   Cleansed Wound cleanser 09/20/22 1509   Dressing/Treatment Alginate with Ag;Collagen with Ag 09/20/22 1509   Offloading for Diabetic Foot Ulcers Post-op shoe 09/20/22 1509   Dressing Change Due 09/27/22 09/20/22 1509   Wound Length (cm) 0.7 cm 09/20/22 1509   Wound Width (cm) 0.4 cm 09/20/22 1509   Wound Depth (cm) 0.1 cm 09/20/22 1509   Wound Surface Area (cm^2) 0.28 cm^2 09/20/22 1509   Change in Wound Size % 41.67 09/20/22 1509   Wound Volume (cm^3) 0.028 cm^3 09/20/22 1509   Wound Healing % 81 09/20/22 1509   Post-Procedure Length (cm) 1.1 cm 09/13/22 1420   Post-Procedure Width (cm) 0.8 cm 09/13/22 1420   Post-Procedure Depth (cm) 0.5 cm 09/13/22 1420   Post-Procedure Surface Area (cm^2) 0.88 cm^2 09/13/22 1420   Post-Procedure Volume (cm^3) 0.44 cm^3 09/13/22 1420   Wound Assessment Dry;Dusky;Fibrin 09/13/22 1420   Drainage Amount Small 09/13/22 1420   Drainage Description Serosanguinous 09/13/22 1420   Wound Odor Mild 09/13/22 1420   Janis-Wound/Incision Assessment Dry/flaky; Hyperkeratosis (Callous) 09/13/22 1420   Edges Undefined edges; Unattached edges 09/13/22 1420   Wound Thickness Description Full thickness 09/13/22 1420   Number of days: 14       Wound Toe (Comment  which one) Right multiple dry areas, oozing 09/13/22 (Active)   Wound Image    09/20/22 1509   Wound Etiology Venous 09/20/22 1509   Dressing Status Intact 09/20/22 1509   Cleansed Wound cleanser 09/13/22 1420   Dressing/Treatment Alginate with Ag;Collagen with Ag;Gauze dressing/dressing sponge;Roll gauze 09/13/22 1420   Offloading for Diabetic Foot Ulcers Diabetic shoes/inserts 09/13/22 1420   Dressing Change Due 09/20/22 09/13/22 1420   Wound Length (cm) 0.7 cm 09/27/22 1517   Wound Width (cm) 0.5 cm 09/27/22 1517   Wound Depth (cm) 0.2 cm 09/27/22 1517   Wound Surface Area (cm^2) 0.35 cm^2 09/27/22 1517   Change in Wound Size % 99 09/27/22 1517   Wound Volume (cm^3) 0.07 cm^3 09/27/22 1517   Wound Healing % 99 09/27/22 1517   Post-Procedure Length (cm) 5.2 cm 09/13/22 1420   Post-Procedure Width (cm) 7.2 cm 09/13/22 1420   Post-Procedure Depth (cm) 0.3 cm 09/13/22 1420   Post-Procedure Surface Area (cm^2) 37.44 cm^2 09/13/22 1420   Post-Procedure Volume (cm^3) 11.232 cm^3 09/13/22 1420   Wound Assessment Denuded;Devitalized tissue 09/13/22 1420   Drainage Amount Small 09/13/22 1420   Drainage Description Serosanguinous 09/13/22 1420   Wound Odor Mild 09/13/22 1420   Janis-Wound/Incision Assessment Blanchable erythema 09/13/22 1420   Edges Unattached edges; Undefined edges 09/13/22 1420   Wound Thickness Description Full thickness 09/13/22 1420   Number of days: 14       Wound Left;Plantar small punctur wound 09/13/22 (Active)   Wound Image   09/13/22 1420   Wound Etiology Venous 09/13/22 1420   Dressing Status Intact;Breakthrough drainage noted 09/13/22 1420   Cleansed Wound cleanser 09/13/22 1420   Dressing/Treatment Alginate with Ag;Collagen with Ag;Roll gauze 09/13/22 1420   Offloading for Diabetic Foot Ulcers Diabetic shoes/inserts 09/13/22 1420   Dressing Change Due 09/20/22 09/13/22 1420   Wound Length (cm) 0.2 cm 09/13/22 1420   Wound Width (cm) 0.2 cm 09/13/22 1420   Wound Depth (cm) 0.2 cm 09/13/22 1420   Wound Surface Area (cm^2) 0.04 cm^2 09/13/22 1420   Wound Volume (cm^3) 0.008 cm^3 09/13/22 1420   Post-Procedure Length (cm) 0.3 cm 09/13/22 1420   Post-Procedure Width (cm) 0.2 cm 09/13/22 1420   Post-Procedure Depth (cm) 0.3 cm 09/13/22 1420   Post-Procedure Surface Area (cm^2) 0.06 cm^2 09/13/22 1420   Post-Procedure Volume (cm^3) 0.018 cm^3 09/13/22 1420   Wound Assessment Fibrin;Pale granulation tissue; Devitalized tissue 09/13/22 1420   Drainage Amount Small 09/13/22 1420   Drainage Description Serosanguinous 09/13/22 1420   Wound Odor Mild 09/13/22 1420   Janis-Wound/Incision Assessment Blanchable erythema;Dry/flaky; Hyperkeratosis (Callous) 09/13/22 1420   Edges Defined edges 09/13/22 1420   Wound Thickness Description Full thickness 09/13/22 1420   Number of days: 14        Percent of Wound(s)/Ulcer(s) Debrided: 100%    Total Surface Area Debrided:  Approx 4 sq cm See RN's note    Diabetic/Pressure/Non Pressure Ulcers only: Diabetic ulcer, fat layer exposed  Ulcer:     Estimated Blood Loss:  None    Hemostasis Achieved:     Procedural Pain: 0 / 10     Post Procedural Pain: 0 / 10     Response to treatment: Well tolerated by patient

## 2022-09-28 VITALS
OXYGEN SATURATION: 100 % | HEART RATE: 83 BPM | SYSTOLIC BLOOD PRESSURE: 147 MMHG | RESPIRATION RATE: 18 BRPM | DIASTOLIC BLOOD PRESSURE: 81 MMHG | TEMPERATURE: 98.4 F

## 2022-09-28 NOTE — WOUND CARE
09/27/22 1517   Wound Toe (Comment  which one) Right multiple dry areas, oozing 09/13/22   Date First Assessed/Time First Assessed: 09/13/22 1410   Present on Hospital Admission: Yes  Wound Approximate Age at First Assessment (Weeks): 8 weeks  Primary Wound Type: Diabetic Ulcer  Location: Toe (Comment  which one)  Wound Location Orientation. .. Wound Image     Wound Etiology Diabetic   Dressing Status Intact   Cleansed Wound cleanser   Dressing/Treatment Alginate with Ag;Collagen   Offloading for Diabetic Foot Ulcers Felt or foam   Dressing Change Due 10/04/22   Wound Length (cm) 0.1 cm   Wound Width (cm) 0.1 cm   Wound Depth (cm) 0.1 cm   Wound Surface Area (cm^2) 0.01 cm^2   Change in Wound Size % 99.97   Wound Volume (cm^3) 0.001 cm^3   Wound Healing % 100   Post-Procedure Length (cm) 0.1 cm   Post-Procedure Width (cm) 0.1 cm   Post-Procedure Depth (cm) 0.2 cm   Post-Procedure Surface Area (cm^2) 0.01 cm^2   Post-Procedure Volume (cm^3) 0.002 cm^3   Wound Assessment Epithelialization;Pink/red   Drainage Amount Scant   Drainage Description Sanguineous   Wound Odor None   Janis-Wound/Incision Assessment Intact   Edges Attached edges   Wound Thickness Description Partial thickness   Wound Toe (Comment  which one) Left left great toe posterior wound dry. 09/13/22   Date First Assessed/Time First Assessed: 09/13/22 1411   Wound Approximate Age at First Assessment (Weeks): 8 weeks  Primary Wound Type: Diabetic Ulcer  Location: Toe (Comment  which one)  Wound Location Orientation: Left  Wound Description: left grea. ..    Wound Image     Wound Etiology Diabetic   Dressing Status Intact   Cleansed Wound cleanser   Dressing/Treatment Alginate;Collagen   Offloading for Diabetic Foot Ulcers Felt or foam   Dressing Change Due 10/04/22   Wound Length (cm) 0.7 cm   Wound Width (cm) 0.5 cm   Wound Depth (cm) 0.2 cm   Wound Surface Area (cm^2) 0.35 cm^2   Change in Wound Size % 27.08   Wound Volume (cm^3) 0.07 cm^3   Wound Healing % 51   Post-Procedure Length (cm) 0.9 cm   Post-Procedure Width (cm) 0.7 cm   Post-Procedure Depth (cm) 0.2 cm   Post-Procedure Surface Area (cm^2) 0.63 cm^2   Post-Procedure Volume (cm^3) 0.126 cm^3   Wound Assessment Pale granulation tissue   Drainage Amount Small   Drainage Description Serous   Wound Odor None   Janis-Wound/Incision Assessment Hyperkeratosis (Callous)   Edges Epibole (rolled edges)   Wound Thickness Description Full thickness

## 2022-09-28 NOTE — DISCHARGE INSTRUCTIONS
Discharge Instructions from  [address] [facility]  [address] [facility], [place], [address] [place] [address] Ave  [phone] Fax [phone]    Do not remove dressing     Return Appointment:  [] Wound and dressing supply provider:   [] ECF or Home Healthcare:  [] Wound Assessment: [] Physician or NP scheduled for Wound Assessment:   [x] Return Appointment: With MD  in  1 Week(s)  [] Ordered tests:       [facility] Information: Should you experience any significant changes in your wound(s) or have questions about your wound care, please contact the [facility] at [address] [place] [address] 8:00 am - 4:30. If you need help with your wound outside these hours and cannot wait until we are again available, contact your PCP or go to the hospital emergency room. PLEASE NOTE: IF YOU ARE UNABLE TO OBTAIN WOUND SUPPLIES, CONTINUE TO USE THE SUPPLIES YOU HAVE AVAILABLE UNTIL YOU ARE ABLE TO REACH US. IT IS MOST IMPORTANT TO KEEP THE WOUND COVERED AT ALL TIMES.

## 2022-10-04 ENCOUNTER — HOSPITAL ENCOUNTER (OUTPATIENT)
Dept: WOUND CARE | Age: 57
Discharge: HOME | End: 2022-10-04
Attending: PODIATRIST
Payer: MEDICARE

## 2022-10-04 VITALS
TEMPERATURE: 98.6 F | OXYGEN SATURATION: 100 % | HEART RATE: 98 BPM | SYSTOLIC BLOOD PRESSURE: 158 MMHG | DIASTOLIC BLOOD PRESSURE: 75 MMHG | RESPIRATION RATE: 18 BRPM

## 2022-10-04 DIAGNOSIS — L08.9 FOREIGN BODY IN LEFT FOOT WITH INFECTION, INITIAL ENCOUNTER: ICD-10-CM

## 2022-10-04 DIAGNOSIS — L97.402 DIABETIC ULCER OF MIDFOOT ASSOCIATED WITH DIABETES MELLITUS DUE TO UNDERLYING CONDITION, WITH FAT LAYER EXPOSED, UNSPECIFIED LATERALITY (HCC): ICD-10-CM

## 2022-10-04 DIAGNOSIS — S90.852A FOREIGN BODY IN LEFT FOOT WITH INFECTION, INITIAL ENCOUNTER: ICD-10-CM

## 2022-10-04 DIAGNOSIS — L97.421 ULCER OF LEFT HEEL, LIMITED TO BREAKDOWN OF SKIN (HCC): ICD-10-CM

## 2022-10-04 DIAGNOSIS — L97.522 ULCER OF LEFT FOOT, WITH FAT LAYER EXPOSED (HCC): Primary | ICD-10-CM

## 2022-10-04 DIAGNOSIS — E08.621 DIABETIC ULCER OF MIDFOOT ASSOCIATED WITH DIABETES MELLITUS DUE TO UNDERLYING CONDITION, WITH FAT LAYER EXPOSED, UNSPECIFIED LATERALITY (HCC): ICD-10-CM

## 2022-10-04 PROCEDURE — 11042 DBRDMT SUBQ TIS 1ST 20SQCM/<: CPT

## 2022-10-04 RX ORDER — LIDOCAINE HYDROCHLORIDE 20 MG/ML
JELLY TOPICAL ONCE
Status: CANCELLED | OUTPATIENT
Start: 2022-10-04 | End: 2022-10-04

## 2022-10-04 RX ORDER — CLOBETASOL PROPIONATE 0.5 MG/G
OINTMENT TOPICAL ONCE
Status: CANCELLED | OUTPATIENT
Start: 2022-10-04 | End: 2022-10-04

## 2022-10-04 RX ORDER — MUPIROCIN 20 MG/G
OINTMENT TOPICAL ONCE
Status: CANCELLED | OUTPATIENT
Start: 2022-10-04 | End: 2022-10-04

## 2022-10-04 RX ORDER — LIDOCAINE 50 MG/G
OINTMENT TOPICAL ONCE
Status: CANCELLED | OUTPATIENT
Start: 2022-10-04 | End: 2022-10-04

## 2022-10-04 RX ORDER — BACITRACIN ZINC AND POLYMYXIN B SULFATE 500; 1000 [USP'U]/G; [USP'U]/G
OINTMENT TOPICAL ONCE
Status: CANCELLED | OUTPATIENT
Start: 2022-10-04 | End: 2022-10-04

## 2022-10-04 RX ORDER — LIDOCAINE 40 MG/G
CREAM TOPICAL ONCE
Status: CANCELLED | OUTPATIENT
Start: 2022-10-04 | End: 2022-10-04

## 2022-10-04 RX ORDER — LIDOCAINE HYDROCHLORIDE 40 MG/ML
SOLUTION TOPICAL ONCE
Status: CANCELLED | OUTPATIENT
Start: 2022-10-04 | End: 2022-10-04

## 2022-10-04 NOTE — DISCHARGE INSTRUCTIONS
Discharge Instructions from  [address] [facility]  [address] [place], [place], [facility]  [phone] Fax [phone]    NAME:  Soha Chong  YOB: 1965  MEDICAL RECORD NUMBER:  [id number]  DATE:  10/04/22    Wound Cleansing:   Do not scrub or use excessive force. Cleanse wound prior to applying a clean dressing with:  [] Normal Saline [x] Keep Wound Dry in Shower    [] Wound Cleanser   [] Cleanse wound with Mild Soap & Water  [] May Shower at Discharge   [] Other:      Topical Treatments:  Do not apply lotions, creams, or ointments to wound bed unless directed. Dressings:           Wound Location: Left great toe   [x] Apply Primary Dressing:       [] MediHoney Gel [] Alginate with Silver [] Alginate   [] Collagen [] Collagen with Silver   [] Santyl with Moisten saline gauze     [] Hydrocolloid   [] MediHoney Alginate [] Foam with Silver   [] Foam   [x] Hydrofera Blue    [x] Mepilex Border    [] Moisten with Saline [] Hydrogel [] Mepitel     [] Bactroban/Mupirocin [] Polysporin  [] Other:     Avoid contact of tape with skin.   [x] Change dressing: [] Daily    [] Every Other Day [] Three times per week   [] Once a week [] Do Not Change Dressing   [x] Other: Tuesdays and may change up to 2 times at home when showering     Off-Loading:   [x] Off-loading when [x] walking  [x] in bed [x] sitting  [] Total non-weight bearing  [] Right Leg  [] Left Leg   [] Assistive Device [] Walker [] Cane  [] Wheelchair  [] Crutches   [] Surgical shoe    [] Podus Boot(s)   [] Foam Boot(s)  [] Roll About    [] Cast Boot [] CROW Boot  [] Other:       Dietary:  [] Diet as tolerated: [x] Calorie Diabetic Diet: [] No Added Salt:  [] Increase Protein: [] Other:   Activity:  [x] Activity as tolerated:  [] Patient has no activity restrictions     [] Strict Bedrest: [] Remain off Work:     [] May return to full duty work:                                   [] Return to work with restrictions:             Return Appointment:  [] Wound and dressing supply provider:   [] ECF or Home Healthcare:  [] Wound Assessment: [] Physician or NP scheduled for Wound Assessment:   [x] Return Appointment: With MD  in  [address])  [] Ordered tests:      Electronically signed April Pramod Lujan RN on 10/4/2022 at [address] [facility] [address]: Should you experience any significant changes in your wound(s) or have questions about your wound care, please contact the [address] [place] [address] at [address] [place] [address] 8:00 am - 4:30. If you need help with your wound outside these hours and cannot wait until we are again available, contact your PCP or go to the hospital emergency room. PLEASE NOTE: IF YOU ARE UNABLE TO OBTAIN WOUND SUPPLIES, CONTINUE TO USE THE SUPPLIES YOU HAVE AVAILABLE UNTIL YOU ARE ABLE TO REACH US. IT IS MOST IMPORTANT TO KEEP THE WOUND COVERED AT ALL TIMES.

## 2022-10-04 NOTE — WOUND CARE
10/04/22 1636   Wound Toe (Comment  which one) Left left great toe posterior wound dry. 09/13/22   Date First Assessed/Time First Assessed: 09/13/22 1411   Wound Approximate Age at First Assessment (Weeks): 8 weeks  Primary Wound Type: Diabetic Ulcer  Location: Toe (Comment  which one)  Wound Location Orientation: Left  Wound Description: left grea. .. Wound Image    Wound Etiology Diabetic   Dressing Status Tape changed; Reinforced dressing   Cleansed Wound cleanser   Dressing/Treatment Hydrofera Blue;Silicone border;Tubular bandage   Offloading for Diabetic Foot Ulcers Felt or foam   Dressing Change Due 10/07/22   Wound Length (cm) 0.5 cm   Wound Width (cm) 0.5 cm   Wound Depth (cm) 0.2 cm   Wound Surface Area (cm^2) 0.25 cm^2   Change in Wound Size % 47.92   Wound Volume (cm^3) 0.05 cm^3   Wound Healing % 65   Post-Procedure Length (cm) 0.6 cm   Post-Procedure Width (cm) 0.6 cm   Post-Procedure Depth (cm) 0.2 cm   Post-Procedure Surface Area (cm^2) 0.36 cm^2   Post-Procedure Volume (cm^3) 0.072 cm^3   Wound Assessment Pale granulation tissue   Drainage Amount Small   Drainage Description Serous   Wound Odor None   Janis-Wound/Incision Assessment Maceration; Hyperkeratosis (Callous)   Edges Epibole (rolled edges)   Wound Thickness Description Full thickness

## 2022-10-04 NOTE — PROGRESS NOTES
Debridement Wound Care        Problem List Items Addressed This Visit    None      No orders of the defined types were placed in this encounter. Procedure Note  Indications:  Based on my examination of this patient's wound(s)/ulcer(s) today, debridement is required to promote healing and evaluate the wound base. Performed by: Lucrecia Thomas DPM    Consent obtained: Yes    Time out taken: Yes    Debridement: Excisional    Using  #15 blade the wound(s)/ulcer(s) was/were sharply debrided down through and including the removal of    epidermis, dermis, and subcutaneous tissue    Devitalized Tissue Debrided: fibrin, biofilm, and callus    Pre Debridement Measurements:  Are located in the Wound/Ulcer Documentation Flow Sheet    Wound/Ulcer #: 1    Post Debridement Measurements:  Wound/Ulcer Descriptions are Pre Debridement except measurements: Diabetic ulcer, fat layer exposed    Wound Toe (Comment  which one) Left left great toe posterior wound dry.  09/13/22 (Active)   Number of days: 21       Wound Toe (Comment  which one) Right multiple dry areas, oozing 09/13/22 (Active)   Number of days: 21        Percent of Wound(s)/Ulcer(s) Debrided: 100%    Total Surface Area Debrided:  Approx 2 sq cm See RN's note    Diabetic/Pressure/Non Pressure Ulcers only: Diabetic ulcer, fat layer exposed  Ulcer:     Estimated Blood Loss:  None    Hemostasis Achieved: Pressure    Procedural Pain: 0 / 10     Post Procedural Pain: 0 / 10     Response to treatment: Well tolerated by patient

## 2022-10-04 NOTE — WOUND CARE
[address]:     [facility] [address] [place], [facility]  p: [phone] f: [phone]    Ref# [id number]  Fax: [phone]      Jeseniavianneyjaden:     [facility]  [address] [address]  [facility] [address] [phone]    Patient Information:      Renuka Cruz  [address] [place] [address] Jhon Givens [address] [phone]  : 1965  AGE: 49 y.o. GENDER: female   TODAYS DATE:  10/4/2022    Insurance:      PRIMARY INSURANCE:  [id number] - (Medicare)    Payer/Plan Subscr  Sex Relation Sub. Ins. ID Effective Group Num   1. [facility]* LUCINDA NICK 1965 Female Self [id number] 22                                    [address] [address]   2. [facility] MEDICAID* LUCINDA NICK 1965 Female Self [id number] 10/1/21 [id number]                                   [address] [address]       Patient Wound Information:      Problem List Items Addressed This Visit          Endocrine    Diabetic ulcer of foot with fat layer exposed ([address]    Relevant Orders    INITIATE OUTPATIENT WOUND CARE PROTOCOL       Integumentary    Ulcer of left foot, with fat layer exposed ([address] - Primary    Relevant Orders    INITIATE OUTPATIENT WOUND CARE PROTOCOL    Ulcer of left heel, limited to breakdown of skin ([address]    Relevant Orders    INITIATE OUTPATIENT WOUND CARE PROTOCOL       Other    Foreign body in left foot with infection    Relevant Orders    INITIATE OUTPATIENT WOUND CARE PROTOCOL       WOUNDS REQUIRING DRESSING SUPPLIES:     Wound Toe (Comment  which one) Left left great toe posterior wound dry. 22 (Active)   Wound Image   10/04/22 1636   Wound Etiology Diabetic 10/04/22 1636   Dressing Status Tape changed; Reinforced dressing 10/04/22 1636   Cleansed Wound cleanser 10/04/22 1636   Dressing/Treatment Hydrofera Blue;Silicone border;Tubular bandage 10/04/22 1636   Offloading for Diabetic Foot Ulcers Felt or Chief complaint  Patient is a 66y old  Female who presents with a chief complaint of NSTEMI (06 Nov 2019 10:09)   Review of systems  Patient in bed, looks comfortable, no fever, no hypoglycemia.    Labs and Fingersticks  CAPILLARY BLOOD GLUCOSE      POCT Blood Glucose.: 245 mg/dL (06 Nov 2019 11:42)  POCT Blood Glucose.: 133 mg/dL (06 Nov 2019 07:35)  POCT Blood Glucose.: 142 mg/dL (05 Nov 2019 21:34)  POCT Blood Glucose.: 133 mg/dL (05 Nov 2019 16:54)      Anion Gap, Serum: 11 (11-06 @ 03:35)  Anion Gap, Serum: 10 (11-05 @ 06:04)      Calcium, Total Serum: 8.8 (11-06 @ 03:35)  Calcium, Total Serum: 8.8 (11-05 @ 06:04)          11-06    139  |  105  |  27<H>  ----------------------------<  133<H>  4.1   |  23  |  0.84    Ca    8.8      06 Nov 2019 03:35                          9.0    15.31 )-----------( 502      ( 06 Nov 2019 03:35 )             27.7     Medications  MEDICATIONS  (STANDING):  aspirin  chewable 81 milliGRAM(s) Oral daily  atorvastatin 80 milliGRAM(s) Oral at bedtime  chlorhexidine 2% Cloths 1 Application(s) Topical <User Schedule>  ciprofloxacin     Tablet 250 milliGRAM(s) Oral two times a day  dextrose 10% Bolus 125 milliLiter(s) IV Bolus once  dextrose 10% Bolus 250 milliLiter(s) IV Bolus once  dextrose 5%. 1000 milliLiter(s) (50 mL/Hr) IV Continuous <Continuous>  dextrose 50% Injectable 50 milliLiter(s) IV Push every 15 minutes  dextrose 50% Injectable 25 milliLiter(s) IV Push every 15 minutes  furosemide    Tablet 40 milliGRAM(s) Oral daily  heparin  Infusion 1200 Unit(s)/Hr (12 mL/Hr) IV Continuous <Continuous>  insulin glargine Injectable (LANTUS) 24 Unit(s) SubCutaneous at bedtime  insulin lispro (HumaLOG) corrective regimen sliding scale   SubCutaneous three times a day before meals  insulin lispro (HumaLOG) corrective regimen sliding scale   SubCutaneous at bedtime  insulin lispro Injectable (HumaLOG) 12 Unit(s) SubCutaneous three times a day before meals  lisinopril 5 milliGRAM(s) Oral daily  pantoprazole    Tablet 40 milliGRAM(s) Oral before breakfast  polyethylene glycol 3350 17 Gram(s) Oral daily  spironolactone 25 milliGRAM(s) Oral daily  warfarin 5 milliGRAM(s) Oral once      Physical Exam  General: Patient comfortable in bed  Vital Signs Last 12 Hrs  T(F): 98.3 (11-06-19 @ 12:37), Max: 98.3 (11-06-19 @ 12:37)  HR: 96 (11-06-19 @ 12:37) (89 - 98)  BP: 132/84 (11-06-19 @ 12:37) (117/72 - 136/79)  BP(mean): --  RR: 18 (11-06-19 @ 12:37) (18 - 18)  SpO2: 99% (11-06-19 @ 12:37) (95% - 99%)  Neck: No palpable thyroid nodules.  CVS: S1S2, No murmurs  Respiratory: No wheezing, no crepitations  GI: Abdomen soft, bowel sounds positive  Musculoskeletal:  edema lower extremities.   Skin: No skin rashes, no ecchymosis    Diagnostics foam 10/04/22 1636   Dressing Change Due 10/07/22 10/04/22 1636   Wound Length (cm) 0.5 cm 10/04/22 1636   Wound Width (cm) 0.5 cm 10/04/22 1636   Wound Depth (cm) 0.2 cm 10/04/22 1636   Wound Surface Area (cm^2) 0.25 cm^2 10/04/22 1636   Change in Wound Size % 47.92 10/04/22 1636   Wound Volume (cm^3) 0.05 cm^3 10/04/22 1636   Wound Healing % 65 10/04/22 1636   Post-Procedure Length (cm) 0.6 cm 10/04/22 1636   Post-Procedure Width (cm) 0.6 cm 10/04/22 1636   Post-Procedure Depth (cm) 0.2 cm 10/04/22 1636   Post-Procedure Surface Area (cm^2) 0.36 cm^2 10/04/22 1636   Post-Procedure Volume (cm^3) 0.072 cm^3 10/04/22 1636   Wound Assessment Pale granulation tissue 10/04/22 1636   Drainage Amount Small 10/04/22 1636   Drainage Description Serous 10/04/22 1636   Wound Odor None 10/04/22 1636   Janis-Wound/Incision Assessment Maceration; Hyperkeratosis (Callous) 10/04/22 1636   Edges Epibole (rolled edges) 10/04/22 1636   Wound Thickness Description Full thickness 10/04/22 1636   Number of days: 21        Supplies Requested :      WOUND #:1   PRIMARY DRESSING:  Hydrofera blue TRANSFER    Other Mepilex border     FREQUENCY OF DRESSING CHANGES:  Two times per week         ADDITIONAL ITEMS:  [] Gloves Small  [x] Gloves Medium [] Gloves Large [] Gloves XLarge  [] Tape 1\" [] Tape 2\" [] Tape 3\"  [] Medipore Tape  [x] Saline  [] Skin Prep   [] Adhesive Remover   [] Cotton Tip Applicators   [] Other:    Patient Wound(s) Debrided: [x] Yes if yes please add date 10/4/2022   [] No    Debribement Type: Excisional/Sharp    Patient currently being seen by Home Health: [] Yes   [x] No    Duration for needed supplies:  []15  [x]30  []60  []90 Days    Electronically signed by Rosaura Mishra RN on 10/4/2022 at 4:39 PM    Provider Information:      PROVIDER'S NAME: Dr. Santino Hazel    NPI: [id number] Chief complaint  Patient is a 66y old  Female who presents with a chief complaint of NSTEMI (06 Nov 2019 10:09)   Review of systems  Patient in bed, looks comfortable, no fever,  no hypoglycemia.    Labs and Fingersticks  CAPILLARY BLOOD GLUCOSE      POCT Blood Glucose.: 245 mg/dL (06 Nov 2019 11:42)  POCT Blood Glucose.: 133 mg/dL (06 Nov 2019 07:35)  POCT Blood Glucose.: 142 mg/dL (05 Nov 2019 21:34)  POCT Blood Glucose.: 133 mg/dL (05 Nov 2019 16:54)      Anion Gap, Serum: 11 (11-06 @ 03:35)  Anion Gap, Serum: 10 (11-05 @ 06:04)      Calcium, Total Serum: 8.8 (11-06 @ 03:35)  Calcium, Total Serum: 8.8 (11-05 @ 06:04)          11-06    139  |  105  |  27<H>  ----------------------------<  133<H>  4.1   |  23  |  0.84    Ca    8.8      06 Nov 2019 03:35                          9.0    15.31 )-----------( 502      ( 06 Nov 2019 03:35 )             27.7     Medications  MEDICATIONS  (STANDING):  aspirin  chewable 81 milliGRAM(s) Oral daily  atorvastatin 80 milliGRAM(s) Oral at bedtime  chlorhexidine 2% Cloths 1 Application(s) Topical <User Schedule>  ciprofloxacin     Tablet 250 milliGRAM(s) Oral two times a day  dextrose 10% Bolus 125 milliLiter(s) IV Bolus once  dextrose 10% Bolus 250 milliLiter(s) IV Bolus once  dextrose 5%. 1000 milliLiter(s) (50 mL/Hr) IV Continuous <Continuous>  dextrose 50% Injectable 50 milliLiter(s) IV Push every 15 minutes  dextrose 50% Injectable 25 milliLiter(s) IV Push every 15 minutes  furosemide    Tablet 40 milliGRAM(s) Oral daily  heparin  Infusion 1200 Unit(s)/Hr (12 mL/Hr) IV Continuous <Continuous>  insulin glargine Injectable (LANTUS) 24 Unit(s) SubCutaneous at bedtime  insulin lispro (HumaLOG) corrective regimen sliding scale   SubCutaneous three times a day before meals  insulin lispro (HumaLOG) corrective regimen sliding scale   SubCutaneous at bedtime  insulin lispro Injectable (HumaLOG) 12 Unit(s) SubCutaneous three times a day before meals  lisinopril 5 milliGRAM(s) Oral daily  pantoprazole    Tablet 40 milliGRAM(s) Oral before breakfast  polyethylene glycol 3350 17 Gram(s) Oral daily  spironolactone 25 milliGRAM(s) Oral daily  warfarin 5 milliGRAM(s) Oral once      Physical Exam  General: Patient comfortable in bed  Vital Signs Last 12 Hrs  T(F): 98.3 (11-06-19 @ 12:37), Max: 98.3 (11-06-19 @ 12:37)  HR: 96 (11-06-19 @ 12:37) (89 - 98)  BP: 132/84 (11-06-19 @ 12:37) (117/72 - 136/79)  BP(mean): --  RR: 18 (11-06-19 @ 12:37) (18 - 18)  SpO2: 99% (11-06-19 @ 12:37) (95% - 99%)  Neck: No palpable thyroid nodules.  CVS: S1S2, No murmurs  Respiratory: No wheezing, no crepitations  GI: Abdomen soft, bowel sounds positive  Musculoskeletal:  edema lower extremities.   Skin: No skin rashes, no ecchymosis    Diagnostics

## 2022-10-07 NOTE — WOUND CARE
09/20/22 1509   Wound Toe (Comment  which one) Left left great toe posterior wound dry. 09/13/22   Date First Assessed/Time First Assessed: 09/13/22 1411   Wound Approximate Age at First Assessment (Weeks): 8 weeks  Primary Wound Type: Diabetic Ulcer  Location: Toe (Comment  which one)  Wound Location Orientation: Left  Wound Description: left grea. .. Wound Image    Wound Etiology Venous   Dressing Status Intact; Old drainage noted   Cleansed Wound cleanser   Dressing/Treatment Alginate with Ag;Collagen with Ag   Offloading for Diabetic Foot Ulcers Post-op shoe   Dressing Change Due 09/27/22   Wound Length (cm) 0.7 cm   Wound Width (cm) 0.4 cm   Wound Depth (cm) 0.1 cm   Wound Surface Area (cm^2) 0.28 cm^2   Change in Wound Size % 41.67   Wound Volume (cm^3) 0.028 cm^3   Wound Healing % 81   Post-Procedure Length (cm) 1 cm   Post-Procedure Width (cm) 0.6 cm   Post-Procedure Depth (cm) 0.3 cm   Post-Procedure Surface Area (cm^2) 0.6 cm^2   Post-Procedure Volume (cm^3) 0.18 cm^3   Wound Assessment Pale granulation tissue   Drainage Amount Small   Drainage Description Serous   Wound Odor None   Janis-Wound/Incision Assessment Hyperkeratosis (Callous)   Edges Undefined edges;Epibole (rolled edges)   Wound Thickness Description Full thickness

## 2022-10-11 ENCOUNTER — HOSPITAL ENCOUNTER (OUTPATIENT)
Dept: WOUND CARE | Age: 57
Discharge: HOME | End: 2022-10-11
Attending: PODIATRIST
Payer: MEDICARE

## 2022-10-11 DIAGNOSIS — L97.421 ULCER OF LEFT HEEL, LIMITED TO BREAKDOWN OF SKIN (HCC): ICD-10-CM

## 2022-10-11 DIAGNOSIS — L08.9 FOREIGN BODY IN LEFT FOOT WITH INFECTION, INITIAL ENCOUNTER: ICD-10-CM

## 2022-10-11 DIAGNOSIS — E08.621 DIABETIC ULCER OF MIDFOOT ASSOCIATED WITH DIABETES MELLITUS DUE TO UNDERLYING CONDITION, WITH FAT LAYER EXPOSED, UNSPECIFIED LATERALITY (HCC): ICD-10-CM

## 2022-10-11 DIAGNOSIS — L97.522 ULCER OF LEFT FOOT, WITH FAT LAYER EXPOSED (HCC): Primary | ICD-10-CM

## 2022-10-11 DIAGNOSIS — L97.402 DIABETIC ULCER OF MIDFOOT ASSOCIATED WITH DIABETES MELLITUS DUE TO UNDERLYING CONDITION, WITH FAT LAYER EXPOSED, UNSPECIFIED LATERALITY (HCC): ICD-10-CM

## 2022-10-11 DIAGNOSIS — S90.852A FOREIGN BODY IN LEFT FOOT WITH INFECTION, INITIAL ENCOUNTER: ICD-10-CM

## 2022-10-11 PROCEDURE — 11042 DBRDMT SUBQ TIS 1ST 20SQCM/<: CPT

## 2022-10-11 RX ORDER — LIDOCAINE HYDROCHLORIDE 40 MG/ML
SOLUTION TOPICAL ONCE
Status: CANCELLED | OUTPATIENT
Start: 2022-10-11 | End: 2022-10-11

## 2022-10-11 RX ORDER — LIDOCAINE 40 MG/G
CREAM TOPICAL ONCE
Status: CANCELLED | OUTPATIENT
Start: 2022-10-11 | End: 2022-10-11

## 2022-10-11 RX ORDER — LIDOCAINE HYDROCHLORIDE 20 MG/ML
JELLY TOPICAL ONCE
Status: COMPLETED | OUTPATIENT
Start: 2022-10-11 | End: 2022-10-11

## 2022-10-11 RX ORDER — LIDOCAINE HYDROCHLORIDE 20 MG/ML
JELLY TOPICAL ONCE
Status: CANCELLED | OUTPATIENT
Start: 2022-10-11 | End: 2022-10-11

## 2022-10-11 RX ORDER — CLOBETASOL PROPIONATE 0.5 MG/G
OINTMENT TOPICAL ONCE
Status: CANCELLED | OUTPATIENT
Start: 2022-10-11 | End: 2022-10-11

## 2022-10-11 RX ORDER — MUPIROCIN 20 MG/G
OINTMENT TOPICAL ONCE
Status: CANCELLED | OUTPATIENT
Start: 2022-10-11 | End: 2022-10-11

## 2022-10-11 RX ORDER — BACITRACIN ZINC AND POLYMYXIN B SULFATE 500; 1000 [USP'U]/G; [USP'U]/G
OINTMENT TOPICAL ONCE
Status: CANCELLED | OUTPATIENT
Start: 2022-10-11 | End: 2022-10-11

## 2022-10-11 RX ORDER — LIDOCAINE 50 MG/G
OINTMENT TOPICAL ONCE
Status: CANCELLED | OUTPATIENT
Start: 2022-10-11 | End: 2022-10-11

## 2022-10-11 RX ADMIN — LIDOCAINE HYDROCHLORIDE 5 ML: 20 JELLY TOPICAL at 15:32

## 2022-10-12 VITALS
SYSTOLIC BLOOD PRESSURE: 143 MMHG | TEMPERATURE: 98.4 F | RESPIRATION RATE: 18 BRPM | OXYGEN SATURATION: 100 % | DIASTOLIC BLOOD PRESSURE: 61 MMHG | HEART RATE: 106 BPM

## 2022-10-12 NOTE — DISCHARGE INSTRUCTIONS
Discharge Instructions from  [address]  [address]  [phone] Fax [phone]    NAME:  [address]:  1965  MEDICAL RECORD NUMBER:  [id number]  DATE:  @ED@    Wound Cleansing:   Do not scrub or use excessive force. Cleanse wound prior to applying a clean dressing with:  [] Normal Saline [] Keep Wound Dry in Shower    [x] Wound Cleanser   [] Cleanse wound with Mild Soap & Water  [] May Shower at Discharge   [] Other:      Topical Treatments:  Do not apply lotions, creams, or ointments to wound bed unless directed. [] Apply moisturizing lotion to skin surrounding the wound prior to dressing change.  [] Apply antifungal ointment to skin surrounding the wound prior to dressing change. [x] Apply thin film of moisture barrier ointment to skin immediately around wound. [] Other:       Dressings:           Wound Location Left foot great Toe   [x] Apply Primary Dressing:       [] MediHoney Gel [] Alginate with Silver [x] Alginate   [] Collagen [x] Collagen with Silver   [] Santyl with Moisten saline gauze     [] Hydrocolloid   [] MediHoney Alginate [] Foam with Silver   [] Foam   [] Hydrofera Blue    [] Mepilex Border    [] Moisten with Saline [] Hydrogel [x] Mepitel one     [] Bactroban/Mupirocin [] Polysporin  [] Other:    [] Pack wound loosely with  [] Iodoform   [] Plain Packing  [] Other   [x] Cover and Secure with:     [x] Gauze [x] Mandy [] Kerlix   [] Ace Wrap [] Cover Roll Tape [] ABD     [] Other:    Avoid contact of tape with skin.   [x] Change dressing: [] Daily    [] Every Other Day [] Three times per week   [x] Once a week [] Do Not Change Dressing   [] Other:    Dietary:  [x] Diet as tolerated: [] Calorie Diabetic Diet: [] No Added Salt:  [x] Increase Protein: [] Other:   Activity:  [x] Activity as tolerated:  [x] Patient has no activity restrictions     [] Strict Bedrest: [] Remain off Work:     [] May return to full duty work:                                   [] Return to work with restrictions:             Return Appointment:  [x] Wound and dressing supply provider:   [x] ECF or Home Healthcare:  [x] Wound Assessment: [] Physician or NP scheduled for Wound Assessment:   [x] Return Appointment: 1 Week(s)  [] Ordered tests:      Electronically signed Daljit Padilla LPN on 46/67/7379 at [facility]: Should you experience any significant changes in your wound(s) or have questions about your wound care, please contact the [facility] [place] [facility] at [address] [place] [address] 8:00 am - 4:30. If you need help with your wound outside these hours and cannot wait until we are again available, contact your PCP or go to the hospital emergency room. PLEASE NOTE: IF YOU ARE UNABLE TO OBTAIN WOUND SUPPLIES, CONTINUE TO USE THE SUPPLIES YOU HAVE AVAILABLE UNTIL YOU ARE ABLE TO REACH US. IT IS MOST IMPORTANT TO KEEP THE WOUND COVERED AT ALL TIMES.      Physician Signature:_______________________    Date: ___________ Time:  ____________

## 2022-10-12 NOTE — PROGRESS NOTES
Debridement Wound Care        Problem List Items Addressed This Visit          Endocrine    Diabetic ulcer of foot with fat layer exposed ([address]    Relevant Medications    lidocaine (XYLOCAINE) 2 % jelly (Completed)    Other Relevant Orders    INITIATE OUTPATIENT WOUND CARE PROTOCOL       Integumentary    Ulcer of left foot, with fat layer exposed ([address] - Primary    Relevant Medications    lidocaine (XYLOCAINE) 2 % jelly (Completed)    Other Relevant Orders    INITIATE OUTPATIENT WOUND CARE PROTOCOL    Ulcer of left heel, limited to breakdown of skin ([facility])    Relevant Medications    lidocaine (XYLOCAINE) 2 % jelly (Completed)    Other Relevant Orders    INITIATE OUTPATIENT WOUND CARE PROTOCOL       Other    Foreign body in left foot with infection    Relevant Medications    lidocaine (XYLOCAINE) 2 % jelly (Completed)    Other Relevant Orders    INITIATE OUTPATIENT WOUND CARE PROTOCOL       Medications Ordered Today   Medications    lidocaine (XYLOCAINE) 2 % jelly        Procedure Note  Indications:  Based on my examination of this patient's wound(s)/ulcer(s) today, debridement is required to promote healing and evaluate the wound base. Performed by: Trever Abad DPM    Consent obtained: Yes    Time out taken: Yes    Debridement: Excisional    Using  #10 blade the wound(s)/ulcer(s) was/were sharply debrided down through and including the removal of    epidermis, dermis, and subcutaneous tissue    Devitalized Tissue Debrided: fibrin, biofilm, and callus    Pre Debridement Measurements:  Are located in the Wound/Ulcer Documentation Flow Sheet    Wound/Ulcer #: 1    Post Debridement Measurements:  Wound/Ulcer Descriptions are Pre Debridement except measurements: Diabetic ulcer, fat layer exposed    Wound Toe (Comment  which one) Left left great toe posterior wound dry.  09/13/22 (Active)   Wound Image    10/11/22 1448   Wound Etiology Diabetic 10/11/22 1448   Dressing Status Intact 10/11/22 1448   Cleansed Wound cleanser 10/11/22 1448   Dressing/Treatment Collagen with Ag;Roll gauze;Coban/self-adherent bandages;Gauze dressing/dressing sponge 10/11/22 1448   Offloading for Diabetic Foot Ulcers Felt or foam 10/11/22 1448   Dressing Change Due 10/18/22 10/11/22 1448   Wound Length (cm) 0.6 cm 10/11/22 1448   Wound Width (cm) 0.6 cm 10/11/22 1448   Wound Depth (cm) 0.2 cm 10/11/22 1448   Wound Surface Area (cm^2) 0.36 cm^2 10/11/22 1448   Change in Wound Size % 25 10/11/22 1448   Wound Volume (cm^3) 0.072 cm^3 10/11/22 1448   Wound Healing % 50 10/11/22 1448   Post-Procedure Length (cm) 0.8 cm 10/11/22 1448   Post-Procedure Width (cm) 0.8 cm 10/11/22 1448   Post-Procedure Depth (cm) 0.2 cm 10/11/22 1448   Post-Procedure Surface Area (cm^2) 0.64 cm^2 10/11/22 1448   Post-Procedure Volume (cm^3) 0.128 cm^3 10/11/22 1448   Wound Assessment Dusky;Dry;Fibrin;Pale granulation tissue 10/11/22 1448   Drainage Amount Small 10/11/22 1448   Drainage Description Serous 10/11/22 1448   Wound Odor None 10/11/22 1448   Janis-Wound/Incision Assessment Blanchable erythema;Dry/flaky; Hyperkeratosis (Callous) 10/11/22 1448   Edges Epibole (rolled edges) 10/11/22 1448   Wound Thickness Description Full thickness 10/11/22 1448   Number of days: 29        Percent of Wound(s)/Ulcer(s) Debrided: 100%    Total Surface Area Debrided:  Approx 2 sq cm See RN's note    Diabetic/Pressure/Non Pressure Ulcers only: Diabetic ulcer, fat layer exposed  Ulcer:     Estimated Blood Loss:  Minimal     Hemostasis Achieved: Pressure    Procedural Pain: 0 / 10     Post Procedural Pain: 0 / 10     Response to treatment: Well tolerated by patient

## 2022-10-18 ENCOUNTER — APPOINTMENT (OUTPATIENT)
Dept: WOUND CARE | Age: 57
End: 2022-10-18
Attending: PODIATRIST

## 2022-10-25 ENCOUNTER — HOSPITAL ENCOUNTER (OUTPATIENT)
Dept: WOUND CARE | Age: 57
Discharge: HOME | End: 2022-10-25
Attending: PODIATRIST
Payer: MEDICARE

## 2022-10-25 VITALS
DIASTOLIC BLOOD PRESSURE: 70 MMHG | OXYGEN SATURATION: 98 % | RESPIRATION RATE: 18 BRPM | SYSTOLIC BLOOD PRESSURE: 134 MMHG | HEART RATE: 97 BPM | TEMPERATURE: 97.8 F

## 2022-10-25 DIAGNOSIS — L97.421 ULCER OF LEFT HEEL, LIMITED TO BREAKDOWN OF SKIN (HCC): ICD-10-CM

## 2022-10-25 DIAGNOSIS — L08.9 FOREIGN BODY IN LEFT FOOT WITH INFECTION, INITIAL ENCOUNTER: ICD-10-CM

## 2022-10-25 DIAGNOSIS — S90.852A FOREIGN BODY IN LEFT FOOT WITH INFECTION, INITIAL ENCOUNTER: ICD-10-CM

## 2022-10-25 DIAGNOSIS — L97.522 ULCER OF LEFT FOOT, WITH FAT LAYER EXPOSED (HCC): Primary | ICD-10-CM

## 2022-10-25 DIAGNOSIS — L97.402 DIABETIC ULCER OF MIDFOOT ASSOCIATED WITH DIABETES MELLITUS DUE TO UNDERLYING CONDITION, WITH FAT LAYER EXPOSED, UNSPECIFIED LATERALITY (HCC): ICD-10-CM

## 2022-10-25 DIAGNOSIS — E08.621 DIABETIC ULCER OF MIDFOOT ASSOCIATED WITH DIABETES MELLITUS DUE TO UNDERLYING CONDITION, WITH FAT LAYER EXPOSED, UNSPECIFIED LATERALITY (HCC): ICD-10-CM

## 2022-10-25 PROCEDURE — 11042 DBRDMT SUBQ TIS 1ST 20SQCM/<: CPT

## 2022-10-25 RX ORDER — LIDOCAINE 50 MG/G
OINTMENT TOPICAL ONCE
Status: CANCELLED | OUTPATIENT
Start: 2022-10-25 | End: 2022-10-25

## 2022-10-25 RX ORDER — CLOBETASOL PROPIONATE 0.5 MG/G
OINTMENT TOPICAL ONCE
Status: CANCELLED | OUTPATIENT
Start: 2022-10-25 | End: 2022-10-25

## 2022-10-25 RX ORDER — LIDOCAINE HYDROCHLORIDE 20 MG/ML
JELLY TOPICAL ONCE
Status: CANCELLED | OUTPATIENT
Start: 2022-10-25 | End: 2022-10-25

## 2022-10-25 RX ORDER — MUPIROCIN 20 MG/G
OINTMENT TOPICAL ONCE
Status: CANCELLED | OUTPATIENT
Start: 2022-10-25 | End: 2022-10-25

## 2022-10-25 RX ORDER — LIDOCAINE 40 MG/G
CREAM TOPICAL ONCE
Status: CANCELLED | OUTPATIENT
Start: 2022-10-25 | End: 2022-10-25

## 2022-10-25 RX ORDER — LIDOCAINE HYDROCHLORIDE 40 MG/ML
SOLUTION TOPICAL ONCE
Status: CANCELLED | OUTPATIENT
Start: 2022-10-25 | End: 2022-10-25

## 2022-10-25 RX ORDER — BACITRACIN ZINC AND POLYMYXIN B SULFATE 500; 1000 [USP'U]/G; [USP'U]/G
OINTMENT TOPICAL ONCE
Status: CANCELLED | OUTPATIENT
Start: 2022-10-25 | End: 2022-10-25

## 2022-10-26 NOTE — PROGRESS NOTES
Debridement Wound Care        Problem List Items Addressed This Visit          Endocrine    Diabetic ulcer of foot with fat layer exposed ([address]    Relevant Orders    INITIATE OUTPATIENT WOUND CARE PROTOCOL       Integumentary    Ulcer of left foot, with fat layer exposed ([address] - Primary    Relevant Orders    INITIATE OUTPATIENT WOUND CARE PROTOCOL    Ulcer of left heel, limited to breakdown of skin ([address]    Relevant Orders    INITIATE OUTPATIENT WOUND CARE PROTOCOL       Other    Foreign body in left foot with infection    Relevant Orders    INITIATE OUTPATIENT WOUND CARE PROTOCOL       No orders of the defined types were placed in this encounter. Procedure Note  Indications:  Based on my examination of this patient's wound(s)/ulcer(s) today, debridement is required to promote healing and evaluate the wound base. Performed by: Edis Jenkins DPM    Consent obtained: Yes    Time out taken: Yes    Debridement: Excisional    Using  #10 blade the wound(s)/ulcer(s) was/were sharply debrided down through and including the removal of    epidermis, dermis, and subcutaneous tissue    Devitalized Tissue Debrided: fibrin, biofilm, slough, and callus    Pre Debridement Measurements:  Are located in the Wound/Ulcer Documentation Flow Sheet    Wound/Ulcer #: 1    Post Debridement Measurements:  Wound/Ulcer Descriptions are Pre Debridement except measurements: Diabetic ulcer, fat layer exposed    Wound Toe (Comment  which one) Left left great toe posterior wound dry. 09/13/22 (Active)   Wound Image    10/11/22 1448   Wound Etiology Diabetic 10/25/22 1457   Dressing Status Intact; Tape changed 10/25/22 1457   Cleansed Wound cleanser 10/25/22 1457   Dressing/Treatment Collagen;Coban/self-adherent bandages 10/25/22 1457   Offloading for Diabetic Foot Ulcers Felt or foam 10/25/22 1457   Dressing Change Due 11/01/22 10/25/22 1457   Wound Length (cm) 0.5 cm 10/25/22 1457   Wound Width (cm) 0.4 cm 10/25/22 1457   Wound Depth (cm) 0.2 cm 10/25/22 1457   Wound Surface Area (cm^2) 0.2 cm^2 10/25/22 1457   Change in Wound Size % 58.33 10/25/22 1457   Wound Volume (cm^3) 0.04 cm^3 10/25/22 1457   Wound Healing % 72 10/25/22 1457   Post-Procedure Length (cm) 1 cm 10/25/22 1457   Post-Procedure Width (cm) 0.4 cm 10/25/22 1457   Post-Procedure Depth (cm) 0.2 cm 10/25/22 1457   Post-Procedure Surface Area (cm^2) 0.4 cm^2 10/25/22 1457   Post-Procedure Volume (cm^3) 0.08 cm^3 10/25/22 1457   Wound Assessment Dusky;Dry;Fibrin;Pale granulation tissue 10/11/22 1448   Drainage Amount Small 10/11/22 1448   Drainage Description Serous 10/11/22 1448   Wound Odor None 10/11/22 1448   Janis-Wound/Incision Assessment Blanchable erythema;Dry/flaky; Hyperkeratosis (Callous) 10/11/22 1448   Edges Epibole (rolled edges) 10/11/22 1448   Wound Thickness Description Full thickness 10/11/22 1448   Number of days: 43        Percent of Wound(s)/Ulcer(s) Debrided: 100%    Total Surface Area Debrided:  Approx 3 sq cm See RN's note    Diabetic/Pressure/Non Pressure Ulcers only: Diabetic ulcer, fat layer exposed  Ulcer:     Estimated Blood Loss:  Minimal     Hemostasis Achieved: Pressure    Procedural Pain: 0 / 10     Post Procedural Pain: 0 / 10     Response to treatment: Well tolerated by patient

## 2022-10-27 NOTE — DISCHARGE INSTRUCTIONS
Discharge Instructions from  [address] [facility]  [address] [facility], [address] [place] [address] [address]  [phone] Fax [phone]    Do not remove dressing     Return Appointment:  [] Wound and dressing supply provider:   [] ECF or Home Healthcare:  [] Wound Assessment: [] Physician or NP scheduled for Wound Assessment:   [x] Return Appointment: With MD  in  1 Week(s)  [] Ordered tests:       [facility] Information: Should you experience any significant changes in your wound(s) or have questions about your wound care, please contact the [address] [place] [address] at [address] [place] [address] 8:00 am - 4:30. If you need help with your wound outside these hours and cannot wait until we are again available, contact your PCP or go to the hospital emergency room. PLEASE NOTE: IF YOU ARE UNABLE TO OBTAIN WOUND SUPPLIES, CONTINUE TO USE THE SUPPLIES YOU HAVE AVAILABLE UNTIL YOU ARE ABLE TO REACH US. IT IS MOST IMPORTANT TO KEEP THE WOUND COVERED AT ALL TIMES.

## 2022-10-27 NOTE — WOUND CARE
10/25/22 1457   Wound Toe (Comment  which one) Left left great toe posterior wound dry. 09/13/22   Date First Assessed/Time First Assessed: 09/13/22 1411   Wound Approximate Age at First Assessment (Weeks): 8 weeks  Primary Wound Type: Diabetic Ulcer  Location: Toe (Comment  which one)  Wound Location Orientation: Left  Wound Description: left grea. .. Wound Image     Wound Etiology Diabetic   Dressing Status Intact; Tape changed   Cleansed Wound cleanser   Dressing/Treatment Collagen;Coban/self-adherent bandages   Offloading for Diabetic Foot Ulcers Felt or foam   Dressing Change Due 11/01/22   Wound Length (cm) 0.5 cm   Wound Width (cm) 0.4 cm   Wound Depth (cm) 0.2 cm   Wound Surface Area (cm^2) 0.2 cm^2   Change in Wound Size % 58.33   Wound Volume (cm^3) 0.04 cm^3   Wound Healing % 72   Post-Procedure Length (cm) 1 cm   Post-Procedure Width (cm) 0.4 cm   Post-Procedure Depth (cm) 0.2 cm   Post-Procedure Surface Area (cm^2) 0.4 cm^2   Post-Procedure Volume (cm^3) 0.08 cm^3   Wound Assessment Pale granulation tissue   Drainage Amount Small   Drainage Description Serous   Wound Odor None   Janis-Wound/Incision Assessment Hyperkeratosis (Callous)   Edges Epibole (rolled edges)   Wound Thickness Description Full thickness

## 2022-11-01 ENCOUNTER — HOSPITAL ENCOUNTER (OUTPATIENT)
Dept: WOUND CARE | Age: 57
Discharge: HOME | End: 2022-11-01
Attending: PODIATRIST
Payer: MEDICARE

## 2022-11-01 VITALS
TEMPERATURE: 98.4 F | OXYGEN SATURATION: 100 % | DIASTOLIC BLOOD PRESSURE: 66 MMHG | RESPIRATION RATE: 18 BRPM | HEART RATE: 95 BPM | SYSTOLIC BLOOD PRESSURE: 141 MMHG

## 2022-11-01 PROCEDURE — 11042 DBRDMT SUBQ TIS 1ST 20SQCM/<: CPT

## 2022-11-02 NOTE — PROGRESS NOTES
Debridement Wound Care        Problem List Items Addressed This Visit    None      No orders of the defined types were placed in this encounter. Procedure Note  Indications:  Based on my examination of this patient's wound(s)/ulcer(s) today, debridement is required to promote healing and evaluate the wound base. Performed by: Alex Nicholson DPM    Consent obtained: Yes    Time out taken: Yes    Debridement: Excisional    Using  #10 blade the wound(s)/ulcer(s) was/were sharply debrided down through and including the removal of    epidermis, dermis, and subcutaneous tissue    Devitalized Tissue Debrided: fibrin, biofilm, slough, and callus    Pre Debridement Measurements:  Are located in the Wound/Ulcer Documentation Flow Sheet    Wound/Ulcer #: 1    Post Debridement Measurements:  Wound/Ulcer Descriptions are Pre Debridement except measurements: Diabetic ulcer, fat layer exposed    Wound Toe (Comment  which one) Left left great toe posterior wound dry. 09/13/22 (Active)   Wound Image    11/01/22 1511   Wound Etiology Diabetic 10/25/22 1457   Dressing Status Intact; Tape changed 10/25/22 1457   Cleansed Wound cleanser 10/25/22 1457   Dressing/Treatment Collagen;Coban/self-adherent bandages 10/25/22 1457   Offloading for Diabetic Foot Ulcers Felt or foam 10/25/22 1457   Dressing Change Due 11/01/22 10/25/22 1457   Wound Length (cm) 0.5 cm 10/25/22 1457   Wound Width (cm) 0.4 cm 10/25/22 1457   Wound Depth (cm) 0.2 cm 10/25/22 1457   Wound Surface Area (cm^2) 0.2 cm^2 10/25/22 1457   Change in Wound Size % 58.33 10/25/22 1457   Wound Volume (cm^3) 0.04 cm^3 10/25/22 1457   Wound Healing % 72 10/25/22 1457   Post-Procedure Length (cm) 1 cm 10/25/22 1457   Post-Procedure Width (cm) 0.4 cm 10/25/22 1457   Post-Procedure Depth (cm) 0.2 cm 10/25/22 1457   Post-Procedure Surface Area (cm^2) 0.4 cm^2 10/25/22 1457   Post-Procedure Volume (cm^3) 0.08 cm^3 10/25/22 1457   Wound Assessment Pale granulation tissue 10/25/22 1457   Drainage Amount Small 10/25/22 1457   Drainage Description Serous 10/25/22 1457   Wound Odor None 10/25/22 1457   Janis-Wound/Incision Assessment Hyperkeratosis (Callous) 10/25/22 1457   Edges Epibole (rolled edges) 10/25/22 1457   Wound Thickness Description Full thickness 10/25/22 1457   Number of days: 50        Percent of Wound(s)/Ulcer(s) Debrided: 100%    Total Surface Area Debrided:  Approx 2 sq cm See RN's note    Diabetic/Pressure/Non Pressure Ulcers only: Diabetic ulcer, fat layer exposed  Ulcer:     Estimated Blood Loss:  Minimal     Hemostasis Achieved: Pressure    Procedural Pain: 0 / 10     Post Procedural Pain: 0 / 10     Response to treatment: Well tolerated by patient

## 2022-11-03 RX ORDER — LIDOCAINE HYDROCHLORIDE 20 MG/ML
JELLY TOPICAL ONCE
Status: CANCELLED | OUTPATIENT
Start: 2022-11-03 | End: 2022-11-03

## 2022-11-03 RX ORDER — CLOBETASOL PROPIONATE 0.5 MG/G
OINTMENT TOPICAL ONCE
Status: CANCELLED | OUTPATIENT
Start: 2022-11-03 | End: 2022-11-03

## 2022-11-03 RX ORDER — MUPIROCIN 20 MG/G
OINTMENT TOPICAL ONCE
Status: CANCELLED | OUTPATIENT
Start: 2022-11-03 | End: 2022-11-03

## 2022-11-03 RX ORDER — LIDOCAINE HYDROCHLORIDE 40 MG/ML
SOLUTION TOPICAL ONCE
Status: CANCELLED | OUTPATIENT
Start: 2022-11-03 | End: 2022-11-03

## 2022-11-03 RX ORDER — BACITRACIN ZINC AND POLYMYXIN B SULFATE 500; 1000 [USP'U]/G; [USP'U]/G
OINTMENT TOPICAL ONCE
Status: CANCELLED | OUTPATIENT
Start: 2022-11-03 | End: 2022-11-03

## 2022-11-03 RX ORDER — LIDOCAINE 40 MG/G
CREAM TOPICAL ONCE
Status: CANCELLED | OUTPATIENT
Start: 2022-11-03 | End: 2022-11-03

## 2022-11-03 RX ORDER — LIDOCAINE 50 MG/G
OINTMENT TOPICAL ONCE
Status: CANCELLED | OUTPATIENT
Start: 2022-11-03 | End: 2022-11-03

## 2022-11-03 NOTE — WOUND CARE
11/01/22 1511   Wound Toe (Comment  which one) Left left great toe posterior wound dry. 09/13/22   Date First Assessed/Time First Assessed: 09/13/22 1411   Wound Approximate Age at First Assessment (Weeks): 8 weeks  Primary Wound Type: Diabetic Ulcer  Location: Toe (Comment  which one)  Wound Location Orientation: Left  Wound Description: left grea. .. Wound Image     Wound Etiology Diabetic   Dressing Status Intact; Tape changed   Cleansed Wound cleanser   Dressing/Treatment Coban/self-adherent bandages; Alginate with Ag;Collagen with Ag   Offloading for Diabetic Foot Ulcers Felt or foam   Dressing Change Due 11/08/22   Wound Length (cm) 1 cm   Wound Width (cm) 2 cm   Wound Depth (cm) 0.3 cm   Wound Surface Area (cm^2) 2 cm^2   Change in Wound Size % -316.67   Wound Volume (cm^3) 0.6 cm^3   Wound Healing % -317   Post-Procedure Length (cm) 1.1 cm   Post-Procedure Width (cm) 2 cm   Post-Procedure Depth (cm) 0.3 cm   Post-Procedure Surface Area (cm^2) 2.2 cm^2   Post-Procedure Volume (cm^3) 0.66 cm^3   Wound Assessment Pale granulation tissue   Drainage Amount Small   Drainage Description Serous   Wound Odor None   Janis-Wound/Incision Assessment Hyperkeratosis (Callous)   Edges Epibole (rolled edges)   Wound Thickness Description Full thickness

## 2022-11-03 NOTE — DISCHARGE INSTRUCTIONS
Discharge Instructions from  [address]  [address]  [phone] Fax [phone]    NAME:  Tay [address]:  1965  MEDICAL RECORD NUMBER:  [id number]  DATE:  @ED@    Wound Cleansing:   Do not scrub or use excessive force. Cleanse wound prior to applying a clean dressing with:  [] Normal Saline [] Keep Wound Dry in Shower    [x] Wound Cleanser   [] Cleanse wound with Mild Soap & Water  [] May Shower at Discharge   [] Other:      Topical Treatments:  Do not apply lotions, creams, or ointments to wound bed unless directed. [x] Apply moisturizing lotion to skin surrounding the wound prior to dressing change. [x] Apply antifungal ointment to skin surrounding the wound prior to dressing change.  [] Apply thin film of moisture barrier ointment to skin immediately around wound. [] Other:       Dressings:           Wound Location Left Foot Great Toe   [x] Apply Primary Dressing:       [] MediHoney Gel [] Alginate with Silver [x] Alginate   [] Collagen [x] Collagen with Silver   [] Santyl with Moisten saline gauze     [] Hydrocolloid   [] MediHoney Alginate [] Foam with Silver   [] Foam   [] Hydrofera Blue    [] Mepilex Border    [] Moisten with Saline [] Hydrogel [] Mepitel     [] Bactroban/Mupirocin [] Polysporin  [] Other:    [] Pack wound loosely with  [] Iodoform   [] Plain Packing  [] Other   [x] Cover and Secure with:     [x] Gauze [x] Mandy [] Kerlix   [] Ace Wrap [] Cover Roll Tape [] ABD     [] Other:    Avoid contact of tape with skin. [x] Change dressing: [] Daily    [] Every Other Day [] Three times per week   [x] Once a week [] Do Not Change Dressing   [] Other:        [] Elevate leg(s) above the level of the heart when sitting. [] Avoid prolonged standing in one place. [x] Elevate leg(s) above the level of the heart when sitting. [x] Avoid prolonged standing in one place.       Dietary:  [x] Diet as tolerated: [] Calorie Diabetic Diet: [] No Added Salt:  [] Increase Protein: [] Other:   Activity:  [x] Activity as tolerated:  [] Patient has no activity restrictions     [] Strict Bedrest: [] Remain off Work:     [] May return to full duty work:                                   [] Return to work with restrictions:             Return Appointment:  [x] Wound and dressing supply provider:   [] ECF or Home Healthcare:  [x] Wound Assessment: [] Physician or NP scheduled for Wound Assessment:   [x] Return Appointment: 1 Week(s)  [] Ordered tests:      Electronically signed Daljit Padilla LPN on 05/6/9704 at 4:18 AM     Neema Feliz 281: Should you experience any significant changes in your wound(s) or have questions about your wound care, please contact the [facility] Main at [address] [place] [address] 8:00 am - 4:30. If you need help with your wound outside these hours and cannot wait until we are again available, contact your PCP or go to the hospital emergency room. PLEASE NOTE: IF YOU ARE UNABLE TO OBTAIN WOUND SUPPLIES, CONTINUE TO USE THE SUPPLIES YOU HAVE AVAILABLE UNTIL YOU ARE ABLE TO REACH US. IT IS MOST IMPORTANT TO KEEP THE WOUND COVERED AT ALL TIMES.      Physician Signature:_______________________    Date: ___________ Time:  ____________

## 2022-11-08 ENCOUNTER — HOSPITAL ENCOUNTER (OUTPATIENT)
Dept: WOUND CARE | Age: 57
Discharge: HOME | End: 2022-11-08
Attending: PODIATRIST
Payer: MEDICARE

## 2022-11-08 DIAGNOSIS — S90.852A FOREIGN BODY IN LEFT FOOT WITH INFECTION, INITIAL ENCOUNTER: ICD-10-CM

## 2022-11-08 DIAGNOSIS — L08.9 FOREIGN BODY IN LEFT FOOT WITH INFECTION, INITIAL ENCOUNTER: ICD-10-CM

## 2022-11-08 DIAGNOSIS — E08.621 DIABETIC ULCER OF MIDFOOT ASSOCIATED WITH DIABETES MELLITUS DUE TO UNDERLYING CONDITION, WITH FAT LAYER EXPOSED, UNSPECIFIED LATERALITY (HCC): ICD-10-CM

## 2022-11-08 DIAGNOSIS — L97.402 DIABETIC ULCER OF MIDFOOT ASSOCIATED WITH DIABETES MELLITUS DUE TO UNDERLYING CONDITION, WITH FAT LAYER EXPOSED, UNSPECIFIED LATERALITY (HCC): ICD-10-CM

## 2022-11-08 DIAGNOSIS — L97.522 ULCER OF LEFT FOOT, WITH FAT LAYER EXPOSED (HCC): Primary | ICD-10-CM

## 2022-11-08 DIAGNOSIS — L97.421 ULCER OF LEFT HEEL, LIMITED TO BREAKDOWN OF SKIN (HCC): ICD-10-CM

## 2022-11-08 PROCEDURE — 11042 DBRDMT SUBQ TIS 1ST 20SQCM/<: CPT

## 2022-11-08 RX ORDER — LIDOCAINE HYDROCHLORIDE 40 MG/ML
SOLUTION TOPICAL ONCE
Status: CANCELLED | OUTPATIENT
Start: 2022-11-08 | End: 2022-11-08

## 2022-11-08 RX ORDER — LIDOCAINE HYDROCHLORIDE 20 MG/ML
JELLY TOPICAL ONCE
Status: CANCELLED | OUTPATIENT
Start: 2022-11-08 | End: 2022-11-08

## 2022-11-08 RX ORDER — MUPIROCIN 20 MG/G
OINTMENT TOPICAL ONCE
Status: CANCELLED | OUTPATIENT
Start: 2022-11-08 | End: 2022-11-08

## 2022-11-08 RX ORDER — CLOBETASOL PROPIONATE 0.5 MG/G
OINTMENT TOPICAL ONCE
Status: CANCELLED | OUTPATIENT
Start: 2022-11-08 | End: 2022-11-08

## 2022-11-08 RX ORDER — LIDOCAINE 50 MG/G
OINTMENT TOPICAL ONCE
Status: CANCELLED | OUTPATIENT
Start: 2022-11-08 | End: 2022-11-08

## 2022-11-08 RX ORDER — LIDOCAINE 40 MG/G
CREAM TOPICAL ONCE
Status: CANCELLED | OUTPATIENT
Start: 2022-11-08 | End: 2022-11-08

## 2022-11-08 RX ORDER — BACITRACIN ZINC AND POLYMYXIN B SULFATE 500; 1000 [USP'U]/G; [USP'U]/G
OINTMENT TOPICAL ONCE
Status: CANCELLED | OUTPATIENT
Start: 2022-11-08 | End: 2022-11-08

## 2022-11-08 RX ORDER — LIDOCAINE HYDROCHLORIDE 20 MG/ML
JELLY TOPICAL ONCE
Status: COMPLETED | OUTPATIENT
Start: 2022-11-08 | End: 2022-11-08

## 2022-11-08 RX ORDER — CEPHALEXIN 500 MG/1
500 CAPSULE ORAL 3 TIMES DAILY
Qty: 30 CAPSULE | Refills: 0 | Status: SHIPPED | OUTPATIENT
Start: 2022-11-08 | End: 2022-11-18

## 2022-11-08 RX ADMIN — LIDOCAINE HYDROCHLORIDE: 20 JELLY TOPICAL at 14:25

## 2022-11-08 NOTE — PROGRESS NOTES
Debridement Wound Care        Problem List Items Addressed This Visit    None  New wound and blister on the left leg swollen and red    Medications Ordered Today   Medications    cephALEXin (KEFLEX) 500 mg capsule     Sig: Take 1 Capsule by mouth three (3) times daily for 10 days. Indications: an infection of the skin and the tissue below the skin     Dispense:  30 Capsule     Refill:  0       Procedure Note  Indications:  Based on my examination of this patient's wound(s)/ulcer(s) today, debridement is required to promote healing and evaluate the wound base. Performed by: Allen Staley DPM    Consent obtained: Yes    Time out taken: Yes    Debridement: Excisional    Using  Curette and #10 blade the wound(s)/ulcer(s) was/were sharply debrided down through and including the removal of    epidermis, dermis, and subcutaneous tissue    Devitalized Tissue Debrided: biofilm, slough, necrotic/eschar, and callus    Pre Debridement Measurements:  Are located in the Wound/Ulcer Documentation Flow Sheet    Wound/Ulcer #: 1 and 2    Post Debridement Measurements:  Wound/Ulcer Descriptions are Pre Debridement except measurements: Non-Pressure ulcer, fat layer exposed    Wound Toe (Comment  which one) Left left great toe posterior wound dry. 09/13/22 (Active)   Wound Image   11/08/22 1411   Wound Etiology Diabetic 11/08/22 1411   Dressing Status Intact 11/08/22 1411   Cleansed Wound cleanser 11/08/22 1411   Dressing/Treatment Coban/self-adherent bandages; Alginate with Ag;Collagen with Ag 11/01/22 1511   Offloading for Diabetic Foot Ulcers Felt or foam 11/01/22 1511   Dressing Change Due 11/15/22 11/08/22 1411   Wound Length (cm) 1 cm 11/08/22 1411   Wound Width (cm) 2 cm 11/08/22 1411   Wound Depth (cm) 0.3 cm 11/08/22 1411   Wound Surface Area (cm^2) 2 cm^2 11/08/22 1411   Change in Wound Size % -316.67 11/08/22 1411   Wound Volume (cm^3) 0.6 cm^3 11/08/22 1411   Wound Healing % -317 11/08/22 1411   Post-Procedure Length (cm) 1.1 cm 11/01/22 1511   Post-Procedure Width (cm) 2 cm 11/01/22 1511   Post-Procedure Depth (cm) 0.3 cm 11/01/22 1511   Post-Procedure Surface Area (cm^2) 2.2 cm^2 11/01/22 1511   Post-Procedure Volume (cm^3) 0.66 cm^3 11/01/22 1511   Wound Assessment Pale granulation tissue 11/01/22 1511   Drainage Amount Small 11/01/22 1511   Drainage Description Serous 11/01/22 1511   Wound Odor None 11/01/22 1511   Janis-Wound/Incision Assessment Hyperkeratosis (Callous) 11/01/22 1511   Edges Epibole (rolled edges) 11/01/22 1511   Wound Thickness Description Full thickness 11/01/22 1511   Number of days: 56        Percent of Wound(s)/Ulcer(s) Debrided: 100%    Total Surface Area Debrided:  Approx 5 sq cm See RN's note    Diabetic/Pressure/Non Pressure Ulcers only: Non-Pressure ulcer, fat layer exposed  Ulcer:     Estimated Blood Loss:  Minimal     Hemostasis Achieved: Pressure    Procedural Pain: 5 / 10     Post Procedural Pain: 0 / 10     Response to treatment: Patient tolerated treatment with mild discomfort but relieved after procedure was completed  Compression wrapping and elevation

## 2022-11-09 VITALS — SYSTOLIC BLOOD PRESSURE: 125 MMHG | HEART RATE: 93 BPM | DIASTOLIC BLOOD PRESSURE: 62 MMHG

## 2022-11-09 NOTE — WOUND CARE
11/08/22 1411   Wound Toe (Comment  which one) Left left great toe posterior wound dry. 09/13/22   Date First Assessed/Time First Assessed: 09/13/22 1411   Wound Approximate Age at First Assessment (Weeks): 8 weeks  Primary Wound Type: Diabetic Ulcer  Location: Toe (Comment  which one)  Wound Location Orientation: Left  Wound Description: left grea. .. Wound Image    Wound Etiology Diabetic   Dressing Status Intact   Cleansed Wound cleanser   Dressing/Treatment Coban/self-adherent bandages; Hydrofera Blue;Roll gauze   Dressing Change Due 11/15/22   Wound Length (cm) 1 cm   Wound Width (cm) 2 cm   Wound Depth (cm) 0.3 cm   Wound Surface Area (cm^2) 2 cm^2   Change in Wound Size % -316.67   Wound Volume (cm^3) 0.6 cm^3   Wound Healing % -317   Post-Procedure Length (cm) 1 cm   Post-Procedure Width (cm) 2 cm   Post-Procedure Depth (cm) 0.1 cm  (calloused debrided by Dr. Suzanne Gerber)   Post-Procedure Surface Area (cm^2) 2 cm^2   Post-Procedure Volume (cm^3) 0.2 cm^3   Wound Assessment Epithelialization   Drainage Amount None   Wound Odor None   Janis-Wound/Incision Assessment Hyperkeratosis (Callous)   Wound Leg lower Left Ruptured blister 11/08/22   Date First Assessed/Time First Assessed: 11/08/22 1400   Present on Hospital Admission: Yes  Primary Wound Type: Blister/bullae  Location: Leg lower  Wound Location Orientation: Left  Wound Description: Ruptured blister  Date of First Observation: 11/...    Wound Image     Wound Etiology Venous   Dressing Status Intact   Cleansed Wound cleanser   Dressing/Treatment Collagen;Alginate with Ag;Dry dressing;Roll gauze  (2 layer wrap)   Dressing Change Due 11/15/22   Wound Length (cm) 1.5 cm   Wound Width (cm) 2 cm   Wound Depth (cm) 0.1 cm   Wound Surface Area (cm^2) 3 cm^2   Wound Volume (cm^3) 0.3 cm^3   Post-Procedure Length (cm) 1.6 cm   Post-Procedure Width (cm) 2.1 cm   Post-Procedure Depth (cm) 0.1 cm   Post-Procedure Surface Area (cm^2) 3.36 cm^2   Post-Procedure Volume (cm^3) 0.336 cm^3   Wound Assessment Devitalized tissue;Granulation tissue   Drainage Amount Scant   Drainage Description Serosanguinous   Wound Odor None   Janis-Wound/Incision Assessment Edematous; Non-Blanchable erythema   Edges Defined edges   Wound Thickness Description Full thickness       Multilayer Compression Wrap   (Not Unna) Below the Knee    NAME:  Swetha Quinn  YOB: 1965  MEDICAL RECORD NUMBER:  [id number]  DATE:  11/8/2022    Removed old Multilayer wrap if indicated and wash leg with mild soap/water. Applied moisturizing agent to dry skin as needed. Applied primary and secondary dressing as ordered. Applied multilayered dressing below the knee to left lower leg. Instructed patient/caregiver not to remove dressing and to keep it clean and dry. Instructed patient/caregiver on complications to report to provider, such as pain, numbness in toes, heavy drainage, and slippage of dressing.     Response to treatment: Well tolerated by patient       Electronically signed by Anuel Urbina RN on 11/8/2022 at 2:30 PM

## 2022-11-09 NOTE — DISCHARGE INSTRUCTIONS
Discharge Instructions from  [address] [facility]  [address] [place], [address]  [phone] Fax [phone]    NAME:  Tay [address]:  1965  MEDICAL RECORD NUMBER:  [id number]  DATE: 11/8/2022    Wound Cleansing:   Do not scrub or use excessive force. Cleanse wound prior to applying a clean dressing with:  [] Normal Saline [] Keep Wound Dry in Shower    [] Wound Cleanser   [] Cleanse wound with Mild Soap & Water  [] May Shower at Discharge   [] Other:      Topical Treatments:  Do not apply lotions, creams, or ointments to wound bed unless directed. [] Apply moisturizing lotion to skin surrounding the wound prior to dressing change.  [] Apply antifungal ointment to skin surrounding the wound prior to dressing change.  [] Apply thin film of moisture barrier ointment to skin immediately around wound. [] Other:       Dressings:           Wound Location Left leg and left great toe   [] Apply Primary Dressing:       [] MediHoney Gel [] Alginate with Silver [] Alginate   [] Collagen [] Collagen with Silver   [] Santyl with Moisten saline gauze     [] Hydrocolloid   [] MediHoney Alginate [] Foam with Silver   [] Foam   [] Hydrofera Blue    [] Mepilex Border    [] Moisten with Saline [] Hydrogel [] Mepitel     [] Bactroban/Mupirocin [] Polysporin  [] Other:    [] Pack wound loosely with  [] Iodoform   [] Plain Packing  [] Other   [] Cover and Secure with:     [] Gauze [] Mandy [] Kerlix   [] Ace Wrap [] Cover Roll Tape [] ABD     [] Other:    Avoid contact of tape with skin.   [] Change dressing: [] Daily    [] Every Other Day [] Three times per week   [] Once a week [x] Do Not Change Dressing to left leg; Continue to change dressing to left great toe as instructed  [] Other:         Edema Control:  Apply: [] Compression Stocking []Right Leg []Left Leg   [] Tubigrip []Right Leg Double Layer []Left Leg Double Layer       []Right Leg Single Layer []Left Leg Single Layer   [] SpandaGrip []Right Leg  []Left Leg      []Low compression 5-10 mm/Hg      []Medium compression 10-20 mm/Hg     []High compression  20-30 mm/Hg   every morning immediately when getting up should be applied to affected leg(s) from mid foot to knee making sure to cover the heel. Remove every night before going to bed. [x] Elevate leg(s) above the level of the heart when sitting. [] Avoid prolonged standing in one place. Compression:  Apply: [x] Multilayer Compression Wrap Applied in Clinic []RightLeg [x]Left Leg   [x] Multi-layer compression. Do not get leg(s) with wrap wet. If wraps become too tight call the center or completely remove the wrap. [x] Elevate leg(s) above the level of the heart when sitting. [x] Avoid prolonged standing in one place. Dietary:  [x] Diet as tolerated: [] Calorie Diabetic Diet: [] No Added Salt:  [] Increase Protein: [] Other:     Activity:  [x] Activity as tolerated:  [] Patient has no activity restrictions     [] Strict Bedrest: [] Remain off Work:     [] May return to full duty work:                                   [] Return to work with restrictions:             Return Appointment:  [] Wound and dressing supply provider:   [] ECF or Home Healthcare:  [] Wound Assessment: [] Physician or NP scheduled for Wound Assessment:   [x] Return Appointment: With Dr. Amee Robledo  in  [facility])  [] Ordered tests:      Electronically signed Julienne Adhikari RN on 11/8/2022 at 2:30 PM    Neema Feliz 281: Should you experience any significant changes in your wound(s) or have questions about your wound care, please contact the [facility] Main at [address] [place] [address] 8:00 am - 4:30. If you need help with your wound outside these hours and cannot wait until we are again available, contact your PCP or go to the hospital emergency room.      PLEASE NOTE: IF YOU ARE UNABLE TO OBTAIN WOUND SUPPLIES, CONTINUE TO USE THE SUPPLIES YOU HAVE AVAILABLE UNTIL YOU ARE ABLE TO REACH US. IT IS MOST IMPORTANT TO KEEP THE WOUND COVERED AT ALL TIMES.      Physician Signature:_______________________    Date: ___________ Time:  ____________

## 2022-11-15 ENCOUNTER — HOSPITAL ENCOUNTER (OUTPATIENT)
Dept: WOUND CARE | Age: 57
Discharge: HOME | End: 2022-11-15
Attending: PODIATRIST
Payer: MEDICARE

## 2022-11-15 VITALS
OXYGEN SATURATION: 100 % | HEART RATE: 88 BPM | DIASTOLIC BLOOD PRESSURE: 66 MMHG | RESPIRATION RATE: 18 BRPM | SYSTOLIC BLOOD PRESSURE: 130 MMHG | TEMPERATURE: 98.3 F

## 2022-11-15 DIAGNOSIS — S90.852A FOREIGN BODY IN LEFT FOOT WITH INFECTION, INITIAL ENCOUNTER: ICD-10-CM

## 2022-11-15 DIAGNOSIS — L97.421 ULCER OF LEFT HEEL, LIMITED TO BREAKDOWN OF SKIN (HCC): ICD-10-CM

## 2022-11-15 DIAGNOSIS — L97.522 ULCER OF LEFT FOOT, WITH FAT LAYER EXPOSED (HCC): Primary | ICD-10-CM

## 2022-11-15 DIAGNOSIS — L97.402 DIABETIC ULCER OF MIDFOOT ASSOCIATED WITH DIABETES MELLITUS DUE TO UNDERLYING CONDITION, WITH FAT LAYER EXPOSED, UNSPECIFIED LATERALITY (HCC): ICD-10-CM

## 2022-11-15 DIAGNOSIS — E08.621 DIABETIC ULCER OF MIDFOOT ASSOCIATED WITH DIABETES MELLITUS DUE TO UNDERLYING CONDITION, WITH FAT LAYER EXPOSED, UNSPECIFIED LATERALITY (HCC): ICD-10-CM

## 2022-11-15 DIAGNOSIS — L08.9 FOREIGN BODY IN LEFT FOOT WITH INFECTION, INITIAL ENCOUNTER: ICD-10-CM

## 2022-11-15 PROCEDURE — 11042 DBRDMT SUBQ TIS 1ST 20SQCM/<: CPT

## 2022-11-15 RX ORDER — LIDOCAINE HYDROCHLORIDE 20 MG/ML
JELLY TOPICAL ONCE
OUTPATIENT
Start: 2022-11-15 | End: 2022-11-15

## 2022-11-15 RX ORDER — CLOBETASOL PROPIONATE 0.5 MG/G
OINTMENT TOPICAL ONCE
OUTPATIENT
Start: 2022-11-15 | End: 2022-11-15

## 2022-11-15 RX ORDER — BACITRACIN ZINC AND POLYMYXIN B SULFATE 500; 1000 [USP'U]/G; [USP'U]/G
OINTMENT TOPICAL ONCE
OUTPATIENT
Start: 2022-11-15 | End: 2022-11-15

## 2022-11-15 RX ORDER — LIDOCAINE 50 MG/G
OINTMENT TOPICAL ONCE
OUTPATIENT
Start: 2022-11-15 | End: 2022-11-15

## 2022-11-15 RX ORDER — LIDOCAINE HYDROCHLORIDE 40 MG/ML
SOLUTION TOPICAL ONCE
OUTPATIENT
Start: 2022-11-15 | End: 2022-11-15

## 2022-11-15 RX ORDER — LIDOCAINE HYDROCHLORIDE 20 MG/ML
JELLY TOPICAL ONCE
Status: COMPLETED | OUTPATIENT
Start: 2022-11-15 | End: 2022-11-15

## 2022-11-15 RX ORDER — MUPIROCIN 20 MG/G
OINTMENT TOPICAL ONCE
OUTPATIENT
Start: 2022-11-15 | End: 2022-11-15

## 2022-11-15 RX ORDER — LIDOCAINE 40 MG/G
CREAM TOPICAL ONCE
OUTPATIENT
Start: 2022-11-15 | End: 2022-11-15

## 2022-11-15 RX ADMIN — LIDOCAINE HYDROCHLORIDE: 20 JELLY TOPICAL at 17:00

## 2022-11-15 NOTE — WOUND CARE
11/15/22 1638   Wound Leg lower Left Ruptured blister 11/08/22   Date First Assessed/Time First Assessed: 11/08/22 1400   Present on Hospital Admission: Yes  Primary Wound Type: Blister/bullae  Location: Leg lower  Wound Location Orientation: Left  Wound Description: Ruptured blister  Date of First Observation: 11/... Wound Image    Wound Etiology Venous   Dressing Status Intact   Cleansed Wound cleanser   Dressing/Treatment Collagen;Alginate with Ag;Gauze dressing/dressing sponge;Roll gauze  (2 layer wrap)   Dressing Change Due 11/22/22   Wound Length (cm) 1 cm   Wound Width (cm) 0.5 cm   Wound Depth (cm) 0.1 cm   Wound Surface Area (cm^2) 0.5 cm^2   Change in Wound Size % 83.33   Wound Volume (cm^3) 0.05 cm^3   Wound Healing % 83   Post-Procedure Length (cm) 1 cm   Post-Procedure Width (cm) 0.5 cm   Post-Procedure Depth (cm) 0.2 cm   Post-Procedure Surface Area (cm^2) 0.5 cm^2   Post-Procedure Volume (cm^3) 0.1 cm^3   Drainage Amount Scant   Drainage Description Serosanguinous   Wound Odor None   Janis-Wound/Incision Assessment Edematous   Edges Defined edges   Wound Thickness Description Partial thickness   Wound Toe (Comment  which one) Left left great toe posterior wound dry. 09/13/22   Date First Assessed/Time First Assessed: 09/13/22 1411   Wound Approximate Age at First Assessment (Weeks): 8 weeks  Primary Wound Type: Diabetic Ulcer  Location: Toe (Comment  which one)  Wound Location Orientation: Left  Wound Description: left grea. .. Wound Length (cm)   (clinically closed)           Multilayer Compression Wrap   (Not Unna) Below the Knee    NAME:  [address]:  1965  MEDICAL RECORD NUMBER:  [id number]  DATE:  11/15/2022    Removed old Multilayer wrap if indicated and wash leg with mild soap/water. Applied moisturizing agent to dry skin as needed. Applied primary and secondary dressing as ordered. Applied multilayered dressing below the knee to left lower leg.   Instructed patient/caregiver not to remove dressing and to keep it clean and dry. Instructed patient/caregiver on complications to report to provider, such as pain, numbness in toes, heavy drainage, and slippage of dressing. Instructed patient on purpose of compression dressing and on activity and exercise recommendations.     Response to treatment: Well tolerated by patient       Electronically signed by Courtney Dominique RN on 11/15/2022 at 4:41 PM

## 2022-11-15 NOTE — PROGRESS NOTES
Debridement Wound Care        Problem List Items Addressed This Visit    None      No orders of the defined types were placed in this encounter. Procedure Note  Indications:  Based on my examination of this patient's wound(s)/ulcer(s) today, debridement is required to promote healing and evaluate the wound base. Performed by: Trever Abad DPM    Consent obtained: Yes    Time out taken: Yes    Debridement: Excisional    Using  Curette the wound(s)/ulcer(s) was/were sharply debrided down through and including the removal of    epidermis and dermis    Devitalized Tissue Debrided: fibrin and biofilm    Pre Debridement Measurements:  Are located in the Wound/Ulcer Documentation Flow Sheet    Wound/Ulcer #: 1    Post Debridement Measurements:  Wound/Ulcer Descriptions are Pre Debridement except measurements: Non-Pressure ulcer, fat layer exposed    Wound Toe (Comment  which one) Left left great toe posterior wound dry. 09/13/22 (Active)   Wound Image   11/08/22 1411   Wound Etiology Diabetic 11/08/22 1411   Dressing Status Intact 11/08/22 1411   Cleansed Wound cleanser 11/08/22 1411   Dressing/Treatment Coban/self-adherent bandages; Hydrofera Blue;Roll gauze 11/08/22 1411   Offloading for Diabetic Foot Ulcers Felt or foam 11/01/22 1511   Dressing Change Due 11/15/22 11/08/22 1411   Wound Length (cm) 1 cm 11/08/22 1411   Wound Width (cm) 2 cm 11/08/22 1411   Wound Depth (cm) 0.3 cm 11/08/22 1411   Wound Surface Area (cm^2) 2 cm^2 11/08/22 1411   Change in Wound Size % -316.67 11/08/22 1411   Wound Volume (cm^3) 0.6 cm^3 11/08/22 1411   Wound Healing % -317 11/08/22 1411   Post-Procedure Length (cm) 1 cm 11/08/22 1411   Post-Procedure Width (cm) 2 cm 11/08/22 1411   Post-Procedure Depth (cm) 0.1 cm 11/08/22 1411   Post-Procedure Surface Area (cm^2) 2 cm^2 11/08/22 1411   Post-Procedure Volume (cm^3) 0.2 cm^3 11/08/22 1411   Wound Assessment Epithelialization 11/08/22 1411   Drainage Amount None 11/08/22 1418 Drainage Description Serous 11/01/22 1511   Wound Odor None 11/08/22 1411   Janis-Wound/Incision Assessment Hyperkeratosis (Callous) 11/08/22 1411   Edges Epibole (rolled edges) 11/01/22 1511   Wound Thickness Description Full thickness 11/01/22 1511   Number of days: 63       Wound Leg lower Left Ruptured blister 11/08/22 (Active)   Wound Image    11/08/22 1411   Wound Etiology Venous 11/08/22 1411   Dressing Status Intact 11/08/22 1411   Cleansed Wound cleanser 11/08/22 1411   Dressing/Treatment Collagen;Alginate with Ag;Dry dressing;Roll gauze 11/08/22 1411   Dressing Change Due 11/15/22 11/08/22 1411   Wound Length (cm) 1.5 cm 11/08/22 1411   Wound Width (cm) 2 cm 11/08/22 1411   Wound Depth (cm) 0.1 cm 11/08/22 1411   Wound Surface Area (cm^2) 3 cm^2 11/08/22 1411   Wound Volume (cm^3) 0.3 cm^3 11/08/22 1411   Post-Procedure Length (cm) 1.6 cm 11/08/22 1411   Post-Procedure Width (cm) 2.1 cm 11/08/22 1411   Post-Procedure Depth (cm) 0.1 cm 11/08/22 1411   Post-Procedure Surface Area (cm^2) 3.36 cm^2 11/08/22 1411   Post-Procedure Volume (cm^3) 0.336 cm^3 11/08/22 1411   Wound Assessment Devitalized tissue;Granulation tissue 11/08/22 1411   Drainage Amount Scant 11/08/22 1411   Drainage Description Serosanguinous 11/08/22 1411   Wound Odor None 11/08/22 1411   Janis-Wound/Incision Assessment Edematous; Non-Blanchable erythema 11/08/22 1411   Edges Defined edges 11/08/22 1411   Wound Thickness Description Full thickness 11/08/22 1411   Number of days: 7        Percent of Wound(s)/Ulcer(s) Debrided: 100%    Total Surface Area Debrided:  Approx 2 sq cm See RN's note    Diabetic/Pressure/Non Pressure Ulcers only: Non-Pressure ulcer, fat layer exposed  Ulcer:     Estimated Blood Loss:  Minimal     Hemostasis Achieved: Pressure    Procedural Pain: 0 / 10     Post Procedural Pain: 0 / 10     Response to treatment: Well tolerated by patient

## 2022-11-22 ENCOUNTER — APPOINTMENT (OUTPATIENT)
Dept: WOUND CARE | Age: 57
End: 2022-11-22
Attending: PODIATRIST